# Patient Record
Sex: MALE | Race: WHITE | NOT HISPANIC OR LATINO | Employment: OTHER | URBAN - METROPOLITAN AREA
[De-identification: names, ages, dates, MRNs, and addresses within clinical notes are randomized per-mention and may not be internally consistent; named-entity substitution may affect disease eponyms.]

---

## 2017-03-15 ENCOUNTER — OFFICE VISIT (OUTPATIENT)
Dept: INTERNAL MEDICINE | Facility: CLINIC | Age: 57
End: 2017-03-15

## 2017-03-15 VITALS
WEIGHT: 204 LBS | HEIGHT: 70 IN | SYSTOLIC BLOOD PRESSURE: 136 MMHG | BODY MASS INDEX: 29.2 KG/M2 | DIASTOLIC BLOOD PRESSURE: 80 MMHG | OXYGEN SATURATION: 97 % | HEART RATE: 85 BPM

## 2017-03-15 DIAGNOSIS — E78.00 HYPERCHOLESTEREMIA: Primary | ICD-10-CM

## 2017-03-15 DIAGNOSIS — F90.9 ATTENTION DEFICIT HYPERACTIVITY DISORDER (ADHD), UNSPECIFIED ADHD TYPE: ICD-10-CM

## 2017-03-15 PROCEDURE — 99214 OFFICE O/P EST MOD 30 MIN: CPT | Performed by: NURSE PRACTITIONER

## 2017-03-15 RX ORDER — METHYLPHENIDATE HYDROCHLORIDE 36 MG/1
36 TABLET ORAL EVERY MORNING
Qty: 90 TABLET | Refills: 0 | Status: SHIPPED | OUTPATIENT
Start: 2017-03-15 | End: 2017-06-07 | Stop reason: SDUPTHER

## 2017-03-15 NOTE — TELEPHONE ENCOUNTER
Please advise med refill    Last OV:  Pt seeing Gayle today.    Kristian ordered    Thank you,    Luci

## 2017-03-16 ENCOUNTER — TELEPHONE (OUTPATIENT)
Dept: INTERNAL MEDICINE | Facility: CLINIC | Age: 57
End: 2017-03-16

## 2017-03-16 NOTE — PROGRESS NOTES
Subjective   Biju Verduzco is a 56 y.o. male who presents for f/u regarding ADD and hyperlipidemia.    HPI Comments: He is doing well with Concerta, denies side effects from medication. He believes medication helps him concentrate and focus on tasks both at home and work.  His recent labs showed an elevated cholesterol, . He has declined chol medication at this time.    ADD   This is a chronic problem. The current episode started more than 1 year ago. The problem occurs daily. The problem has been unchanged. Pertinent negatives include no abdominal pain, arthralgias, chest pain, chills, congestion, coughing, fatigue, fever, headaches, joint swelling, nausea, sore throat, vomiting or weakness. Treatments tried: Concerta. The treatment provided significant relief.   Hyperlipidemia   This is a chronic problem. The current episode started more than 1 year ago. The problem is uncontrolled. Recent lipid tests were reviewed and are high. He has no history of diabetes or hypothyroidism. Pertinent negatives include no chest pain. He is currently on no antihyperlipidemic treatment.        The following portions of the patient's history were reviewed and updated as appropriate: allergies, current medications, past social history and problem list.    Past Medical History   Diagnosis Date   • ADD (attention deficit disorder)    • Anxiety    • Depression    • ED (erectile dysfunction)    • Erectile dysfunction    • Hypercholesteremia    • Sleep disorder          Current Outpatient Prescriptions:   •  aspirin 81 MG EC tablet, Take 81 mg by mouth daily., Disp: , Rfl:   •  Cholecalciferol (VITAMIN D PO), Take  by mouth daily., Disp: , Rfl:   •  methylphenidate (CONCERTA) 36 MG CR tablet, Take 1 tablet by mouth Every Morning., Disp: 90 tablet, Rfl: 0  •  Multiple Vitamins-Minerals (MULTIVITAMIN ADULT PO), Take  by mouth daily., Disp: , Rfl:   •  Thiamine HCl (VITAMIN B-1 PO), Take  by mouth., Disp: , Rfl:   •  vitamin E 200  "UNIT capsule, Take 200 Units by mouth daily., Disp: , Rfl:     No Known Allergies    Review of Systems   Constitutional: Negative for chills, fatigue, fever and unexpected weight change.   HENT: Negative for congestion, ear pain, postnasal drip, sinus pressure, sore throat and trouble swallowing.    Eyes: Negative for visual disturbance.   Respiratory: Negative for cough, chest tightness and wheezing.    Cardiovascular: Negative for chest pain, palpitations and leg swelling.   Gastrointestinal: Negative for abdominal pain, blood in stool, nausea and vomiting.   Endocrine: Negative for cold intolerance and heat intolerance.   Genitourinary: Negative for dysuria, frequency and urgency.   Musculoskeletal: Negative for arthralgias and joint swelling.   Skin: Negative for color change.   Allergic/Immunologic: Negative for immunocompromised state.   Neurological: Negative for syncope, weakness and headaches.   Hematological: Does not bruise/bleed easily.   Psychiatric/Behavioral: Positive for decreased concentration.       Objective   Vitals:    03/15/17 0806   BP: 136/80   BP Location: Left arm   Patient Position: Sitting   Cuff Size: Adult   Pulse: 85   SpO2: 97%   Weight: 204 lb (92.5 kg)   Height: 70\" (177.8 cm)     Physical Exam   Constitutional: He is oriented to person, place, and time. He appears well-developed and well-nourished. No distress.   HENT:   Head: Normocephalic and atraumatic.   Right Ear: External ear normal.   Left Ear: External ear normal.   Eyes: Conjunctivae are normal.   Neck: Normal range of motion. Neck supple.   Cardiovascular: Normal rate, regular rhythm, normal heart sounds and intact distal pulses.  Exam reveals no gallop and no friction rub.    No murmur heard.  Pulmonary/Chest: Effort normal and breath sounds normal. No respiratory distress.   Lymphadenopathy:     He has no cervical adenopathy.   Neurological: He is alert and oriented to person, place, and time.   Skin: Skin is warm and " dry. No rash noted. No erythema.   Psychiatric: He has a normal mood and affect. His behavior is normal.   Nursing note and vitals reviewed.      Assessment/Plan   Biju was seen today for add.    Diagnoses and all orders for this visit:    Hypercholesteremia  Comments:  reviewed recent lipid panel, discussed low-fat, low-cholesterol diet along with regular exercise to help improve    Attention deficit hyperactivity disorder (ADHD), unspecified ADHD type  Comments:  continue Concerta    HEATH query complete. Treatment plan to include limited course of prescribed  controlled substance. Risks including addiction, benefits, and alternatives presented to patient.

## 2017-06-07 DIAGNOSIS — F90.9 ATTENTION DEFICIT HYPERACTIVITY DISORDER (ADHD), UNSPECIFIED ADHD TYPE: ICD-10-CM

## 2017-06-07 RX ORDER — METHYLPHENIDATE HYDROCHLORIDE 36 MG/1
36 TABLET ORAL EVERY MORNING
Qty: 90 TABLET | Refills: 0 | Status: SHIPPED | OUTPATIENT
Start: 2017-06-07 | End: 2017-09-15 | Stop reason: SDUPTHER

## 2017-06-07 NOTE — TELEPHONE ENCOUNTER
----- Message from Natacha Torres sent at 6/7/2017  8:45 AM EDT -----  Contact: Patient  Patient called requesting refill on     methylphenidate (CONCERTA) 36 MG CR tablet, #90    Please call when ready to be picked up    Patient:  [phone]

## 2017-06-12 ENCOUNTER — OFFICE VISIT (OUTPATIENT)
Dept: INTERNAL MEDICINE | Facility: CLINIC | Age: 57
End: 2017-06-12

## 2017-06-12 VITALS
WEIGHT: 202 LBS | BODY MASS INDEX: 28.92 KG/M2 | SYSTOLIC BLOOD PRESSURE: 126 MMHG | OXYGEN SATURATION: 97 % | HEART RATE: 76 BPM | HEIGHT: 70 IN | DIASTOLIC BLOOD PRESSURE: 82 MMHG

## 2017-06-12 DIAGNOSIS — F90.9 ATTENTION DEFICIT HYPERACTIVITY DISORDER (ADHD), UNSPECIFIED ADHD TYPE: Primary | ICD-10-CM

## 2017-06-12 DIAGNOSIS — E78.00 HYPERCHOLESTEREMIA: ICD-10-CM

## 2017-06-12 PROCEDURE — 99213 OFFICE O/P EST LOW 20 MIN: CPT | Performed by: NURSE PRACTITIONER

## 2017-06-13 NOTE — PROGRESS NOTES
Subjective   Biju Verduzco is a 57 y.o. male who presents for f/u regarding ADHD.     HPI Comments: His labs from 12/2016 showed elevated cholesterol, he is trying to watch diet but has declined medication for now. He has a longstanding history of ADHD and has done well with Concerta which he tolerates well. He often tries to skip dose on weekends or when he is not working, notices big difference in ability to concentrate and complete tasks without medication.    Hyperlipidemia   This is a chronic problem. The current episode started more than 1 year ago. The problem is uncontrolled. Recent lipid tests were reviewed and are high. He has no history of diabetes or hypothyroidism. Pertinent negatives include no chest pain or shortness of breath. He is currently on no antihyperlipidemic treatment.   ADD   This is a chronic problem. The current episode started more than 1 year ago. The problem occurs daily. The problem has been unchanged. Pertinent negatives include no abdominal pain, arthralgias, chest pain, chills, congestion, coughing, fatigue, fever, headaches, joint swelling, nausea, sore throat, vomiting or weakness. Treatments tried: takes Concerta. The treatment provided significant relief.        The following portions of the patient's history were reviewed and updated as appropriate: allergies, current medications, past social history and problem list.    Past Medical History:   Diagnosis Date   • ADD (attention deficit disorder)    • Anxiety    • Depression    • ED (erectile dysfunction)    • Erectile dysfunction    • Hypercholesteremia    • Sleep disorder          Current Outpatient Prescriptions:   •  aspirin 81 MG EC tablet, Take 81 mg by mouth daily., Disp: , Rfl:   •  Cholecalciferol (VITAMIN D PO), Take  by mouth daily., Disp: , Rfl:   •  methylphenidate (CONCERTA) 36 MG CR tablet, Take 1 tablet by mouth Every Morning., Disp: 90 tablet, Rfl: 0  •  Multiple Vitamins-Minerals (MULTIVITAMIN ADULT PO), Take  " by mouth daily., Disp: , Rfl:   •  Thiamine HCl (VITAMIN B-1 PO), Take  by mouth., Disp: , Rfl:   •  vitamin E 200 UNIT capsule, Take 200 Units by mouth daily., Disp: , Rfl:     No Known Allergies    Review of Systems   Constitutional: Negative for chills, fatigue, fever and unexpected weight change.   HENT: Negative for congestion, ear pain, postnasal drip, sinus pressure, sore throat and trouble swallowing.    Eyes: Negative for visual disturbance.   Respiratory: Negative for cough, chest tightness, shortness of breath and wheezing.    Cardiovascular: Negative for chest pain, palpitations and leg swelling.   Gastrointestinal: Negative for abdominal pain, blood in stool, nausea and vomiting.   Endocrine: Negative for cold intolerance and heat intolerance.   Genitourinary: Negative for dysuria, frequency and urgency.   Musculoskeletal: Negative for arthralgias and joint swelling.   Skin: Negative for color change.   Allergic/Immunologic: Negative for immunocompromised state.   Neurological: Negative for syncope, weakness and headaches.   Hematological: Does not bruise/bleed easily.   Psychiatric/Behavioral: Positive for decreased concentration.       Objective   Vitals:    06/12/17 1244   BP: 126/82   BP Location: Left arm   Patient Position: Sitting   Cuff Size: Adult   Pulse: 76   SpO2: 97%   Weight: 202 lb (91.6 kg)   Height: 70\" (177.8 cm)     Physical Exam   Constitutional: He is oriented to person, place, and time. He appears well-developed and well-nourished. No distress.   HENT:   Head: Normocephalic and atraumatic.   Right Ear: External ear normal.   Left Ear: External ear normal.   Eyes: Conjunctivae are normal.   Neck: Normal range of motion. Neck supple.   Cardiovascular: Normal rate, regular rhythm, normal heart sounds and intact distal pulses.  Exam reveals no gallop and no friction rub.    No murmur heard.  Pulmonary/Chest: Effort normal and breath sounds normal. No respiratory distress. "   Lymphadenopathy:     He has no cervical adenopathy.   Neurological: He is alert and oriented to person, place, and time.   Skin: Skin is warm and dry. No rash noted. No erythema.   Psychiatric: He has a normal mood and affect. His behavior is normal.   Nursing note and vitals reviewed.      Assessment/Plan   Biju was seen today for hyperlipidemia and add.    Diagnoses and all orders for this visit:    Attention deficit hyperactivity disorder (ADHD), unspecified ADHD type  Comments:  doing well with Concerta which was refilled by Dr. Mustafa today    Hypercholesteremia  Comments:  reviewed labs from 12/2016 with patient and discussed low-fat, low-cholesterol diet      HEATH query complete. Treatment plan to include limited course of prescribed  controlled substance. Risks including addiction, benefits, and alternatives presented to patient.

## 2017-08-28 ENCOUNTER — OFFICE VISIT (OUTPATIENT)
Dept: INTERNAL MEDICINE | Facility: CLINIC | Age: 57
End: 2017-08-28

## 2017-08-28 VITALS
DIASTOLIC BLOOD PRESSURE: 78 MMHG | WEIGHT: 199 LBS | BODY MASS INDEX: 28.49 KG/M2 | SYSTOLIC BLOOD PRESSURE: 128 MMHG | HEIGHT: 70 IN

## 2017-08-28 DIAGNOSIS — E78.00 HYPERCHOLESTEREMIA: Primary | ICD-10-CM

## 2017-08-28 DIAGNOSIS — Z11.59 SCREENING FOR VIRAL DISEASE: ICD-10-CM

## 2017-08-28 DIAGNOSIS — N52.9 ERECTILE DYSFUNCTION, UNSPECIFIED ERECTILE DYSFUNCTION TYPE: ICD-10-CM

## 2017-08-28 DIAGNOSIS — F90.9 ATTENTION DEFICIT HYPERACTIVITY DISORDER (ADHD), UNSPECIFIED ADHD TYPE: ICD-10-CM

## 2017-08-28 PROCEDURE — 99213 OFFICE O/P EST LOW 20 MIN: CPT | Performed by: NURSE PRACTITIONER

## 2017-08-29 NOTE — PROGRESS NOTES
Subjective   Biju Verduzco is a 57 y.o. male who presents for f/u regarding hyperlipidemia, ED and ADHD.    HPI Comments: He reports difficulty with sexual activity due to erectile dysfunction, requests testosterone level be checked.  He has a longstanding history of ADHD and has done well with Concerta which he tolerates well. He often tries to skip dose on weekends or when he is not working, notices big difference in ability to concentrate and complete tasks without medication.    Hyperlipidemia   This is a chronic problem. The current episode started more than 1 year ago. The problem is uncontrolled. Recent lipid tests were reviewed and are high. He has no history of diabetes or hypothyroidism. Pertinent negatives include no chest pain or shortness of breath. He is currently on no antihyperlipidemic treatment.   ADD   This is a chronic problem. The current episode started more than 1 year ago. The problem occurs daily. The problem has been unchanged. Pertinent negatives include no abdominal pain, arthralgias, chest pain, chills, congestion, coughing, fatigue, fever, headaches, joint swelling, nausea, sore throat, vomiting or weakness. Treatments tried: takes Concerta. The treatment provided significant relief.        The following portions of the patient's history were reviewed and updated as appropriate: allergies, current medications, past social history and problem list.    Past Medical History:   Diagnosis Date   • ADD (attention deficit disorder)    • Anxiety    • Depression    • ED (erectile dysfunction)    • Erectile dysfunction    • Hypercholesteremia    • Sleep disorder          Current Outpatient Prescriptions:   •  aspirin 81 MG EC tablet, Take 81 mg by mouth daily., Disp: , Rfl:   •  Cholecalciferol (VITAMIN D PO), Take  by mouth daily., Disp: , Rfl:   •  methylphenidate (CONCERTA) 36 MG CR tablet, Take 1 tablet by mouth Every Morning., Disp: 90 tablet, Rfl: 0  •  Multiple Vitamins-Minerals  "(MULTIVITAMIN ADULT PO), Take  by mouth daily., Disp: , Rfl:   •  Thiamine HCl (VITAMIN B-1 PO), Take  by mouth., Disp: , Rfl:   •  vitamin E 200 UNIT capsule, Take 200 Units by mouth daily., Disp: , Rfl:     No Known Allergies    Review of Systems   Constitutional: Negative for chills, fatigue, fever and unexpected weight change.   HENT: Negative for congestion, ear pain, postnasal drip, sinus pressure, sore throat and trouble swallowing.    Eyes: Negative for visual disturbance.   Respiratory: Negative for cough, chest tightness, shortness of breath and wheezing.    Cardiovascular: Negative for chest pain, palpitations and leg swelling.   Gastrointestinal: Negative for abdominal pain, blood in stool, nausea and vomiting.   Endocrine: Negative for cold intolerance and heat intolerance.   Genitourinary: Negative for dysuria, frequency and urgency.   Musculoskeletal: Negative for arthralgias and joint swelling.   Skin: Negative for color change.   Allergic/Immunologic: Negative for immunocompromised state.   Neurological: Negative for syncope, weakness and headaches.   Hematological: Does not bruise/bleed easily.       Objective   Vitals:    08/28/17 1311   BP: 128/78   BP Location: Left arm   Patient Position: Sitting   Cuff Size: Adult   Weight: 199 lb (90.3 kg)   Height: 70\" (177.8 cm)     Physical Exam   Constitutional: He is oriented to person, place, and time. He appears well-developed and well-nourished. No distress.   HENT:   Head: Normocephalic and atraumatic.   Right Ear: External ear normal.   Left Ear: External ear normal.   Eyes: Conjunctivae are normal.   Neck: Normal range of motion. Neck supple.   Cardiovascular: Normal rate, regular rhythm, normal heart sounds and intact distal pulses.  Exam reveals no gallop and no friction rub.    No murmur heard.  Pulmonary/Chest: Effort normal and breath sounds normal. No respiratory distress.   Lymphadenopathy:     He has no cervical adenopathy.   Neurological: " He is alert and oriented to person, place, and time.   Skin: Skin is warm and dry. No rash noted. No erythema.   Psychiatric: He has a normal mood and affect. His behavior is normal.   Nursing note and vitals reviewed.      Assessment/Plan   Biju was seen today for adhd.    Diagnoses and all orders for this visit:    Hypercholesteremia  Comments:  he will return for fasting labs, has declined statin therapy in the past  Orders:  -     Lipid Panel; Future  -     CBC Auto Differential; Future  -     Comprehensive Metabolic Panel; Future    Attention deficit hyperactivity disorder (ADHD), unspecified ADHD type  Comments:  doing well with Concerta    Erectile dysfunction, unspecified erectile dysfunction type  Comments:  will return for fasting Testosterone level  Orders:  -     Testosterone, Free, Total; Future    Screening for viral disease  -     Hepatitis C Antibody; Future

## 2017-09-15 DIAGNOSIS — F90.9 ATTENTION DEFICIT HYPERACTIVITY DISORDER (ADHD), UNSPECIFIED ADHD TYPE: ICD-10-CM

## 2017-09-15 RX ORDER — METHYLPHENIDATE HYDROCHLORIDE 36 MG/1
36 TABLET ORAL EVERY MORNING
Qty: 90 TABLET | Refills: 0 | Status: SHIPPED | OUTPATIENT
Start: 2017-09-15 | End: 2017-11-30 | Stop reason: SDUPTHER

## 2017-09-29 ENCOUNTER — CLINICAL SUPPORT (OUTPATIENT)
Dept: INTERNAL MEDICINE | Facility: CLINIC | Age: 57
End: 2017-09-29

## 2017-09-29 ENCOUNTER — LAB (OUTPATIENT)
Dept: INTERNAL MEDICINE | Facility: CLINIC | Age: 57
End: 2017-09-29

## 2017-09-29 DIAGNOSIS — E78.00 HYPERCHOLESTEREMIA: ICD-10-CM

## 2017-09-29 DIAGNOSIS — Z23 NEED FOR INFLUENZA VACCINATION: Primary | ICD-10-CM

## 2017-09-29 DIAGNOSIS — N52.9 ERECTILE DYSFUNCTION, UNSPECIFIED ERECTILE DYSFUNCTION TYPE: ICD-10-CM

## 2017-09-29 DIAGNOSIS — Z11.59 SCREENING FOR VIRAL DISEASE: ICD-10-CM

## 2017-09-29 LAB
ALBUMIN SERPL-MCNC: 4.7 G/DL (ref 3.5–5.2)
ALBUMIN/GLOB SERPL: 1.4 G/DL
ALP SERPL-CCNC: 68 U/L (ref 39–117)
ALT SERPL-CCNC: 26 U/L (ref 1–41)
AST SERPL-CCNC: 26 U/L (ref 1–40)
BASOPHILS # BLD AUTO: 0.03 10*3/MM3 (ref 0–0.2)
BASOPHILS NFR BLD AUTO: 0.3 % (ref 0–1.5)
BILIRUB SERPL-MCNC: 0.5 MG/DL (ref 0.1–1.2)
BUN SERPL-MCNC: 16 MG/DL (ref 6–20)
BUN/CREAT SERPL: 18.4 (ref 7–25)
CALCIUM SERPL-MCNC: 10.4 MG/DL (ref 8.6–10.5)
CHLORIDE SERPL-SCNC: 101 MMOL/L (ref 98–107)
CHOLEST SERPL-MCNC: 227 MG/DL (ref 0–200)
CO2 SERPL-SCNC: 26.4 MMOL/L (ref 22–29)
CREAT SERPL-MCNC: 0.87 MG/DL (ref 0.76–1.27)
EOSINOPHIL # BLD AUTO: 0.22 10*3/MM3 (ref 0–0.7)
EOSINOPHIL # BLD AUTO: 2.4 % (ref 0.3–6.2)
ERYTHROCYTE [DISTWIDTH] IN BLOOD BY AUTOMATED COUNT: 13.4 % (ref 11.5–14.5)
GLOBULIN SER CALC-MCNC: 3.3 GM/DL
GLUCOSE SERPL-MCNC: 106 MG/DL (ref 65–99)
HCT VFR BLD AUTO: 43.9 % (ref 40.4–52.2)
HDLC SERPL-MCNC: 41 MG/DL (ref 40–60)
HGB BLD-MCNC: 14.4 G/DL (ref 13.7–17.6)
IMM GRANULOCYTES # BLD: 0.03 10*3/MM3 (ref 0–0.03)
IMM GRANULOCYTES NFR BLD: 0.3 % (ref 0–0.5)
LDLC SERPL CALC-MCNC: 136 MG/DL (ref 0–100)
LYMPHOCYTES # BLD AUTO: 1.85 10*3/MM3 (ref 0.9–4.8)
LYMPHOCYTES NFR BLD AUTO: 20.5 % (ref 19.6–45.3)
MCH RBC QN AUTO: 29.3 PG (ref 27–32.7)
MCHC RBC AUTO-ENTMCNC: 32.8 G/DL (ref 32.6–36.4)
MCV RBC AUTO: 89.4 FL (ref 79.8–96.2)
MONOCYTES # BLD AUTO: 0.71 10*3/MM3 (ref 0.2–1.2)
MONOCYTES NFR BLD AUTO: 7.9 % (ref 5–12)
NEUTROPHILS # BLD AUTO: 6.18 10*3/MM3 (ref 1.9–8.1)
NEUTROPHILS NFR BLD AUTO: 68.6 % (ref 42.7–76)
PLATELET # BLD AUTO: 356 10*3/MM3 (ref 140–500)
POTASSIUM SERPL-SCNC: 4.8 MMOL/L (ref 3.5–5.2)
PROT SERPL-MCNC: 8 G/DL (ref 6–8.5)
RBC # BLD AUTO: 4.91 10*6/MM3 (ref 4.6–6)
SODIUM SERPL-SCNC: 141 MMOL/L (ref 136–145)
TRIGL SERPL-MCNC: 251 MG/DL (ref 0–150)
VLDLC SERPL-MCNC: 50.2 MG/DL (ref 5–40)
WBC # BLD AUTO: 9.02 10*3/MM3 (ref 4.5–10.7)

## 2017-09-29 PROCEDURE — 90686 IIV4 VACC NO PRSV 0.5 ML IM: CPT

## 2017-09-29 PROCEDURE — 90471 IMMUNIZATION ADMIN: CPT

## 2017-09-30 LAB — HCV AB S/CO SERPL IA: 0.1 S/CO RATIO (ref 0–0.9)

## 2017-10-01 LAB
TESTOST FREE SERPL-MCNC: 8.8 PG/ML (ref 7.2–24)
TESTOST SERPL-MCNC: 442 NG/DL (ref 264–916)

## 2017-11-30 ENCOUNTER — OFFICE VISIT (OUTPATIENT)
Dept: INTERNAL MEDICINE | Facility: CLINIC | Age: 57
End: 2017-11-30

## 2017-11-30 VITALS
HEIGHT: 77 IN | DIASTOLIC BLOOD PRESSURE: 74 MMHG | WEIGHT: 202 LBS | OXYGEN SATURATION: 98 % | HEART RATE: 77 BPM | BODY MASS INDEX: 23.85 KG/M2 | SYSTOLIC BLOOD PRESSURE: 124 MMHG

## 2017-11-30 DIAGNOSIS — F90.9 ATTENTION DEFICIT HYPERACTIVITY DISORDER (ADHD), UNSPECIFIED ADHD TYPE: ICD-10-CM

## 2017-11-30 DIAGNOSIS — E78.00 HYPERCHOLESTEREMIA: Primary | ICD-10-CM

## 2017-11-30 PROCEDURE — 99213 OFFICE O/P EST LOW 20 MIN: CPT | Performed by: NURSE PRACTITIONER

## 2017-11-30 RX ORDER — METHYLPHENIDATE HYDROCHLORIDE 36 MG/1
36 TABLET ORAL EVERY MORNING
Qty: 90 TABLET | Refills: 0 | Status: SHIPPED | OUTPATIENT
Start: 2017-11-30 | End: 2018-02-26 | Stop reason: SDUPTHER

## 2017-12-01 NOTE — PROGRESS NOTES
Subjective   Biju Verduzoc is a 57 y.o. male who presents for f/u regarding hyperlipidemia and ADHD.    HPI Comments: Reviewed labs from last visit with patient which showed normal testosterone levels and elevated cholesterol (has declined statins).    ADD   This is a chronic problem. The current episode started more than 1 year ago. The problem occurs daily. The problem has been unchanged. Pertinent negatives include no abdominal pain, arthralgias, chest pain, chills, congestion, coughing, fatigue, fever, headaches, joint swelling, nausea, sore throat, vomiting or weakness. Treatments tried: takes Concerta. The treatment provided significant relief.   Hyperlipidemia   This is a chronic problem. The current episode started more than 1 year ago. The problem is uncontrolled. Recent lipid tests were reviewed and are high. He has no history of diabetes or hypothyroidism. Pertinent negatives include no chest pain or shortness of breath. He is currently on no antihyperlipidemic treatment.        The following portions of the patient's history were reviewed and updated as appropriate: allergies, current medications, past social history and problem list.    Past Medical History:   Diagnosis Date   • ADD (attention deficit disorder)    • Anxiety    • Depression    • ED (erectile dysfunction)    • Erectile dysfunction    • Hypercholesteremia    • Sleep disorder          Current Outpatient Prescriptions:   •  aspirin 81 MG EC tablet, Take 81 mg by mouth daily., Disp: , Rfl:   •  Cholecalciferol (VITAMIN D PO), Take  by mouth daily., Disp: , Rfl:   •  Multiple Vitamins-Minerals (MULTIVITAMIN ADULT PO), Take  by mouth daily., Disp: , Rfl:   •  Thiamine HCl (VITAMIN B-1 PO), Take  by mouth., Disp: , Rfl:   •  vitamin E 200 UNIT capsule, Take 200 Units by mouth daily., Disp: , Rfl:   •  methylphenidate (CONCERTA) 36 MG CR tablet, Take 1 tablet by mouth Every Morning., Disp: 90 tablet, Rfl: 0    No Known Allergies    Review of  "Systems   Constitutional: Negative for chills, fatigue, fever and unexpected weight change.   HENT: Negative for congestion, ear pain, postnasal drip, sinus pressure, sore throat and trouble swallowing.    Eyes: Negative for visual disturbance.   Respiratory: Negative for cough, chest tightness, shortness of breath and wheezing.    Cardiovascular: Negative for chest pain, palpitations and leg swelling.   Gastrointestinal: Negative for abdominal pain, blood in stool, nausea and vomiting.   Genitourinary: Negative for dysuria, frequency and urgency.   Musculoskeletal: Negative for arthralgias and joint swelling.   Skin: Negative for color change.   Neurological: Negative for syncope, weakness and headaches.   Hematological: Does not bruise/bleed easily.   Psychiatric/Behavioral: Positive for decreased concentration.       Objective   Vitals:    11/30/17 1015 11/30/17 1036   BP: 142/82 124/74   BP Location: Left arm Left arm   Patient Position: Sitting    Cuff Size: Adult    Pulse: 77    SpO2: 98%    Weight: 202 lb (91.6 kg)    Height: 77\" (195.6 cm)      Physical Exam   Constitutional: He is oriented to person, place, and time. He appears well-developed and well-nourished. No distress.   HENT:   Head: Normocephalic and atraumatic.   Right Ear: External ear normal.   Left Ear: External ear normal.   Eyes: Conjunctivae are normal.   Neck: Normal range of motion. Neck supple.   Cardiovascular: Normal rate, regular rhythm, normal heart sounds and intact distal pulses.  Exam reveals no gallop and no friction rub.    No murmur heard.  Pulmonary/Chest: Effort normal and breath sounds normal. No respiratory distress.   Lymphadenopathy:     He has no cervical adenopathy.   Neurological: He is alert and oriented to person, place, and time.   Skin: Skin is warm and dry. No rash noted. No erythema.   Psychiatric: He has a normal mood and affect. His behavior is normal.   Nursing note and vitals reviewed.      Assessment/Plan "   Biju was seen today for add.    Diagnoses and all orders for this visit:    Hypercholesteremia  Comments:  reviewed recent lipid panel with patient    Attention deficit hyperactivity disorder (ADHD), unspecified ADHD type  Comments:  doing well with Concerta, refilled by Dr. Martin today    HEATH query complete. Treatment plan to include limited course of prescribed  controlled substance. Risks including addiction, benefits, and alternatives presented to patient.

## 2018-02-26 ENCOUNTER — OFFICE VISIT (OUTPATIENT)
Dept: INTERNAL MEDICINE | Facility: CLINIC | Age: 58
End: 2018-02-26

## 2018-02-26 VITALS
HEIGHT: 77 IN | HEART RATE: 89 BPM | WEIGHT: 192 LBS | DIASTOLIC BLOOD PRESSURE: 78 MMHG | OXYGEN SATURATION: 98 % | BODY MASS INDEX: 22.67 KG/M2 | SYSTOLIC BLOOD PRESSURE: 122 MMHG

## 2018-02-26 DIAGNOSIS — F90.9 ATTENTION DEFICIT HYPERACTIVITY DISORDER (ADHD), UNSPECIFIED ADHD TYPE: ICD-10-CM

## 2018-02-26 DIAGNOSIS — F90.9 ATTENTION DEFICIT HYPERACTIVITY DISORDER (ADHD), UNSPECIFIED ADHD TYPE: Primary | ICD-10-CM

## 2018-02-26 DIAGNOSIS — E78.00 HYPERCHOLESTEREMIA: ICD-10-CM

## 2018-02-26 PROCEDURE — 99213 OFFICE O/P EST LOW 20 MIN: CPT | Performed by: NURSE PRACTITIONER

## 2018-02-26 RX ORDER — METHYLPHENIDATE HYDROCHLORIDE 36 MG/1
36 TABLET ORAL EVERY MORNING
Qty: 90 TABLET | Refills: 0 | Status: SHIPPED | OUTPATIENT
Start: 2018-02-26 | End: 2018-05-29 | Stop reason: SDUPTHER

## 2018-02-27 NOTE — PROGRESS NOTES
Subjective   Biju Verduzco is a 57 y.o. male who presents for f/u regarding ADD and hyperlipidemia.    HPI Comments: He has lost 10# since his last visit by changing his diet, recent lipid panel showed elevated cholesterol. He is feeling well, reports improvement with Concerta.    ADD   This is a chronic problem. The current episode started more than 1 year ago. The problem occurs daily. The problem has been unchanged. Associated symptoms include congestion. Pertinent negatives include no abdominal pain, arthralgias, chest pain, chills, coughing, fatigue, fever, headaches, joint swelling, nausea, sore throat, vomiting or weakness. Treatments tried: takes Concerta. The treatment provided significant relief.   Hyperlipidemia   This is a chronic problem. The current episode started more than 1 year ago. The problem is uncontrolled. Recent lipid tests were reviewed and are high. He has no history of diabetes or hypothyroidism. Pertinent negatives include no chest pain or shortness of breath. Current antihyperlipidemic treatment includes diet change.        The following portions of the patient's history were reviewed and updated as appropriate: allergies, current medications, past social history and problem list.    Past Medical History:   Diagnosis Date   • ADD (attention deficit disorder)    • Anxiety    • Depression    • ED (erectile dysfunction)    • Erectile dysfunction    • Hypercholesteremia    • Sleep disorder          Current Outpatient Prescriptions:   •  aspirin 81 MG EC tablet, Take 81 mg by mouth daily., Disp: , Rfl:   •  Cholecalciferol (VITAMIN D PO), Take  by mouth daily., Disp: , Rfl:   •  Multiple Vitamins-Minerals (MULTIVITAMIN ADULT PO), Take  by mouth daily., Disp: , Rfl:   •  Thiamine HCl (VITAMIN B-1 PO), Take  by mouth., Disp: , Rfl:   •  vitamin E 200 UNIT capsule, Take 200 Units by mouth daily., Disp: , Rfl:   •  methylphenidate (CONCERTA) 36 MG CR tablet, Take 1 tablet by mouth Every Morning  "Earliest Fill Date: 2/26/18, Disp: 90 tablet, Rfl: 0    No Known Allergies    Review of Systems   Constitutional: Negative for chills, fatigue, fever and unexpected weight change.   HENT: Positive for congestion and postnasal drip. Negative for ear pain, sinus pressure, sore throat and trouble swallowing.    Eyes: Negative for visual disturbance.   Respiratory: Negative for cough, chest tightness, shortness of breath and wheezing.    Cardiovascular: Negative for chest pain, palpitations and leg swelling.   Gastrointestinal: Negative for abdominal pain, blood in stool, nausea and vomiting.   Genitourinary: Negative for dysuria, frequency and urgency.   Musculoskeletal: Negative for arthralgias and joint swelling.   Skin: Negative for color change.   Neurological: Negative for syncope, weakness and headaches.   Hematological: Does not bruise/bleed easily.   Psychiatric/Behavioral: Positive for decreased concentration.       Objective   Vitals:    02/26/18 1311   BP: 122/78   BP Location: Left arm   Patient Position: Sitting   Cuff Size: Adult   Pulse: 89   SpO2: 98%   Weight: 87.1 kg (192 lb)   Height: 195.6 cm (77\")     Physical Exam   Constitutional: He appears well-developed and well-nourished. He is cooperative. He does not have a sickly appearance. He does not appear ill.   HENT:   Head: Normocephalic.   Right Ear: Hearing, tympanic membrane and external ear normal. No drainage, swelling or tenderness. Tympanic membrane is not injected, not scarred, not erythematous and not bulging. No middle ear effusion. No decreased hearing is noted.   Left Ear: Hearing, tympanic membrane and external ear normal. No drainage, swelling or tenderness. Tympanic membrane is not injected, not scarred, not erythematous and not bulging.  No middle ear effusion. No decreased hearing is noted.   Nose: Nose normal. No mucosal edema, rhinorrhea, sinus tenderness or nasal deformity. Right sinus exhibits no maxillary sinus tenderness and " no frontal sinus tenderness. Left sinus exhibits no maxillary sinus tenderness and no frontal sinus tenderness.   Mouth/Throat: Mucous membranes are normal. Normal dentition. Posterior oropharyngeal erythema present.   Eyes: Conjunctivae and lids are normal. Pupils are equal, round, and reactive to light. Right eye exhibits no discharge and no exudate. Left eye exhibits no discharge and no exudate.   Neck: Trachea normal and normal range of motion. Carotid bruit is not present. No edema present. No thyroid mass and no thyromegaly present.   Cardiovascular: Regular rhythm, normal heart sounds and normal pulses.    No murmur heard.  Pulmonary/Chest: Breath sounds normal. No respiratory distress. He has no decreased breath sounds. He has no wheezes. He has no rhonchi. He has no rales.   Lymphadenopathy:        Head (right side): No submental, no submandibular, no tonsillar, no preauricular, no posterior auricular and no occipital adenopathy present.        Head (left side): No submental, no submandibular, no tonsillar, no preauricular, no posterior auricular and no occipital adenopathy present.   Neurological: He is alert.   Skin: Skin is warm, dry and intact. No cyanosis. Nails show no clubbing.       Assessment/Plan   Biju was seen today for add.    Diagnoses and all orders for this visit:    Attention deficit hyperactivity disorder (ADHD), unspecified ADHD type  Comments:  doing well with Concerta which he is tolerating well    Hypercholesteremia  Comments:  reviewed lipid panel, will recheck in several months due to dietary changes    HEATH query complete. Treatment plan to include limited course of prescribed  controlled substance. Risks including addiction, benefits, and alternatives presented to patient.

## 2018-05-29 ENCOUNTER — OFFICE VISIT (OUTPATIENT)
Dept: INTERNAL MEDICINE | Facility: CLINIC | Age: 58
End: 2018-05-29

## 2018-05-29 VITALS
WEIGHT: 189 LBS | SYSTOLIC BLOOD PRESSURE: 140 MMHG | OXYGEN SATURATION: 98 % | BODY MASS INDEX: 22.32 KG/M2 | HEIGHT: 77 IN | HEART RATE: 78 BPM | DIASTOLIC BLOOD PRESSURE: 82 MMHG

## 2018-05-29 DIAGNOSIS — F90.9 ATTENTION DEFICIT HYPERACTIVITY DISORDER (ADHD), UNSPECIFIED ADHD TYPE: ICD-10-CM

## 2018-05-29 DIAGNOSIS — H10.13 ALLERGIC CONJUNCTIVITIS OF BOTH EYES: ICD-10-CM

## 2018-05-29 DIAGNOSIS — F90.9 ATTENTION DEFICIT HYPERACTIVITY DISORDER (ADHD), UNSPECIFIED ADHD TYPE: Primary | ICD-10-CM

## 2018-05-29 PROCEDURE — 99213 OFFICE O/P EST LOW 20 MIN: CPT | Performed by: NURSE PRACTITIONER

## 2018-05-29 RX ORDER — METHYLPHENIDATE HYDROCHLORIDE 36 MG/1
36 TABLET ORAL EVERY MORNING
Qty: 90 TABLET | Refills: 0 | Status: SHIPPED | OUTPATIENT
Start: 2018-05-29 | End: 2018-08-29 | Stop reason: SDUPTHER

## 2018-05-29 NOTE — TELEPHONE ENCOUNTER
Please advise med refill    Last OV:  Pt seeing Gayle today, will schedule office visit with you next .    Kristian ordered    Thank you,    Luci

## 2018-05-29 NOTE — PROGRESS NOTES
Subjective   Biju Verduzco is a 57 y.o. male who presents for f/u regarding ADHD.    He has continued to lose weight by following a low-carb, low-sugar diet. He does c/o itching/irritation of eyes bilaterally with increased postnasal drainage.      ADD   This is a chronic problem. The current episode started more than 1 year ago. The problem occurs daily. The problem has been unchanged. Associated symptoms include congestion. Pertinent negatives include no abdominal pain, arthralgias, chest pain, chills, coughing, fatigue, fever, headaches, joint swelling, nausea, sore throat, vomiting or weakness. Treatments tried: takes Concerta. The treatment provided significant relief.        The following portions of the patient's history were reviewed and updated as appropriate: allergies, current medications, past social history and problem list.    Past Medical History:   Diagnosis Date   • ADD (attention deficit disorder)    • Anxiety    • Depression    • ED (erectile dysfunction)    • Erectile dysfunction    • Hypercholesteremia    • Sleep disorder          Current Outpatient Prescriptions:   •  aspirin 81 MG EC tablet, Take 81 mg by mouth daily., Disp: , Rfl:   •  Cholecalciferol (VITAMIN D PO), Take  by mouth daily., Disp: , Rfl:   •  Multiple Vitamins-Minerals (MULTIVITAMIN ADULT PO), Take  by mouth daily., Disp: , Rfl:   •  Thiamine HCl (VITAMIN B-1 PO), Take  by mouth., Disp: , Rfl:   •  vitamin E 200 UNIT capsule, Take 200 Units by mouth daily., Disp: , Rfl:   •  methylphenidate (CONCERTA) 36 MG CR tablet, Take 1 tablet by mouth Every Morning, Disp: 90 tablet, Rfl: 0    No Known Allergies    Review of Systems   Constitutional: Negative for chills, fatigue, fever and unexpected weight change.   HENT: Positive for congestion. Negative for ear pain, postnasal drip, sinus pressure, sore throat and trouble swallowing.    Eyes: Positive for itching. Negative for discharge and visual disturbance.   Respiratory: Negative  "for cough, chest tightness, shortness of breath and wheezing.    Cardiovascular: Negative for chest pain, palpitations and leg swelling.   Gastrointestinal: Negative for abdominal pain, blood in stool, nausea and vomiting.   Genitourinary: Negative for dysuria, frequency and urgency.   Musculoskeletal: Negative for arthralgias and joint swelling.   Skin: Negative for color change.   Neurological: Negative for syncope, weakness and headaches.   Hematological: Does not bruise/bleed easily.   Psychiatric/Behavioral: Positive for decreased concentration.       Objective   Vitals:    05/29/18 1104   BP: 140/82   BP Location: Left arm   Patient Position: Sitting   Cuff Size: Adult   Pulse: 78   SpO2: 98%   Weight: 85.7 kg (189 lb)   Height: 195.6 cm (77\")     Physical Exam   Constitutional: He appears well-developed and well-nourished. He is cooperative. He does not have a sickly appearance. He does not appear ill.   HENT:   Head: Normocephalic.   Right Ear: Hearing, tympanic membrane and external ear normal.   Left Ear: Hearing, tympanic membrane and external ear normal.   Nose: Nose normal. No mucosal edema, rhinorrhea, sinus tenderness or nasal deformity. Right sinus exhibits no maxillary sinus tenderness and no frontal sinus tenderness. Left sinus exhibits no maxillary sinus tenderness and no frontal sinus tenderness.   Mouth/Throat: Oropharynx is clear and moist and mucous membranes are normal. Normal dentition.   Eyes: Lids are normal. Right eye exhibits no discharge and no exudate. Left eye exhibits no discharge and no exudate.   Redness bilateral conjunctivae   Neck: Trachea normal and normal range of motion. Carotid bruit is not present. No edema present. No thyroid mass and no thyromegaly present.   Cardiovascular: Regular rhythm, normal heart sounds and normal pulses.    No murmur heard.  Pulmonary/Chest: Breath sounds normal. No respiratory distress. He has no decreased breath sounds. He has no wheezes. He has " no rhonchi. He has no rales.   Lymphadenopathy:        Head (right side): No submental, no submandibular, no tonsillar, no preauricular, no posterior auricular and no occipital adenopathy present.        Head (left side): No submental, no submandibular, no tonsillar, no preauricular, no posterior auricular and no occipital adenopathy present.   Neurological: He is alert.   Skin: Skin is warm, dry and intact. No cyanosis. Nails show no clubbing.       Assessment/Plan   Biju was seen today for add.    Diagnoses and all orders for this visit:    Attention deficit hyperactivity disorder (ADHD), unspecified ADHD type  Comments:  doing well with current meds, Concerta refilled by Dr. Whaley today    Allergic conjunctivitis of both eyes  Comments:  discussed use of Zaditor eye drops    Reviewed labs from 9/2017 with patient, will recheck at f/u appt.    HEATH query complete. Treatment plan to include limited course of prescribed  controlled substance. Risks including addiction, benefits, and alternatives presented to patient.

## 2018-08-29 ENCOUNTER — OFFICE VISIT (OUTPATIENT)
Dept: INTERNAL MEDICINE | Facility: CLINIC | Age: 58
End: 2018-08-29

## 2018-08-29 VITALS
OXYGEN SATURATION: 98 % | SYSTOLIC BLOOD PRESSURE: 132 MMHG | HEART RATE: 76 BPM | WEIGHT: 188 LBS | BODY MASS INDEX: 22.2 KG/M2 | HEIGHT: 77 IN | DIASTOLIC BLOOD PRESSURE: 82 MMHG

## 2018-08-29 DIAGNOSIS — G47.9 SLEEP DISORDER: Chronic | ICD-10-CM

## 2018-08-29 DIAGNOSIS — F90.9 ATTENTION DEFICIT HYPERACTIVITY DISORDER (ADHD), UNSPECIFIED ADHD TYPE: ICD-10-CM

## 2018-08-29 DIAGNOSIS — E78.00 HYPERCHOLESTEREMIA: Primary | ICD-10-CM

## 2018-08-29 DIAGNOSIS — N52.9 ERECTILE DYSFUNCTION, UNSPECIFIED ERECTILE DYSFUNCTION TYPE: ICD-10-CM

## 2018-08-29 PROCEDURE — 99214 OFFICE O/P EST MOD 30 MIN: CPT | Performed by: INTERNAL MEDICINE

## 2018-08-29 RX ORDER — METHYLPHENIDATE HYDROCHLORIDE 36 MG/1
36 TABLET ORAL EVERY MORNING
Qty: 90 TABLET | Refills: 0 | Status: SHIPPED | OUTPATIENT
Start: 2018-08-29 | End: 2018-12-20 | Stop reason: SDUPTHER

## 2018-08-29 NOTE — PROGRESS NOTES
Subjective   Biju Verduzco is a 58 y.o. male.     History of Present Illness   57 yo/m with a chief complaint of ADD (well compensated), ED (well compensated), hyperlipidemia (diet and exercise), sleep disorder (recommended Tranquil Sleep supliment), here for follow up. He is doing well on his current regimen.  The following portions of the patient's history were reviewed and updated as appropriate: allergies, current medications, past family history, past medical history, past social history, past surgical history and problem list.    Review of Systems   Constitutional: Negative.    HENT: Negative.    Eyes: Negative.    Respiratory: Negative.    Cardiovascular: Negative.    Gastrointestinal: Negative.    Endocrine: Negative.    Genitourinary: Negative.    Musculoskeletal: Negative.    Skin: Negative.    Allergic/Immunologic: Negative.    Neurological: Negative.    Hematological: Negative.    Psychiatric/Behavioral: Negative.        Objective   Physical Exam   Constitutional: He is oriented to person, place, and time. He appears well-developed and well-nourished.   HENT:   Head: Normocephalic and atraumatic.   Eyes: Pupils are equal, round, and reactive to light. EOM are normal.   Neck: Normal range of motion. Neck supple.   Cardiovascular: Normal rate, regular rhythm and normal heart sounds.    Pulmonary/Chest: Effort normal and breath sounds normal.   Abdominal: Soft. Bowel sounds are normal.   Musculoskeletal: Normal range of motion.   Neurological: He is alert and oriented to person, place, and time.   Skin: Skin is warm and dry.   Psychiatric: He has a normal mood and affect. His behavior is normal. Judgment and thought content normal.   Nursing note and vitals reviewed.        Assessment/Plan   Biju was seen today for add and med refill.    Diagnoses and all orders for this visit:    Hypercholesteremia  Comments:  will check lipids    Erectile dysfunction, unspecified erectile dysfunction  type  Comments:  well compensated    Attention deficit hyperactivity disorder (ADHD), unspecified ADHD type  -     methylphenidate (CONCERTA) 36 MG CR tablet; Take 1 tablet by mouth Every Morning    Sleep disorder  Comments:  recommend Tranquil Sleep suppliment

## 2018-10-02 ENCOUNTER — TELEPHONE (OUTPATIENT)
Dept: INTERNAL MEDICINE | Facility: CLINIC | Age: 58
End: 2018-10-02

## 2018-10-02 NOTE — TELEPHONE ENCOUNTER
----- Message from Gayle Davenport sent at 10/2/2018 12:20 PM EDT -----  pts wife calling to check on the status of a [facility]  For pts CONCERTA 36 MG CR tablet  Please advise    Pt#[phone]

## 2018-12-20 DIAGNOSIS — F90.9 ATTENTION DEFICIT HYPERACTIVITY DISORDER (ADHD), UNSPECIFIED ADHD TYPE: ICD-10-CM

## 2018-12-20 RX ORDER — METHYLPHENIDATE HYDROCHLORIDE 36 MG/1
36 TABLET ORAL EVERY MORNING
Qty: 90 TABLET | Refills: 0 | Status: SHIPPED | OUTPATIENT
Start: 2018-12-20 | End: 2019-03-06 | Stop reason: SDUPTHER

## 2018-12-20 NOTE — TELEPHONE ENCOUNTER
----- Message from Jemma Millard sent at 12/20/2018  1:26 PM EST -----  Contact: Patient  Patient calling states he is going out of town and will not have enough methylphenidate (CONCERTA) 36 MG CR tablet to last til he gets back. Would like a refill     methylphenidate (CONCERTA) 36 MG CR tablet    Patient:[phone]    Pharm[id number] [place] [place] [address] AT [facility] [phone] [facility] [phone]

## 2019-03-06 ENCOUNTER — OFFICE VISIT (OUTPATIENT)
Dept: INTERNAL MEDICINE | Facility: CLINIC | Age: 59
End: 2019-03-06

## 2019-03-06 VITALS
SYSTOLIC BLOOD PRESSURE: 124 MMHG | HEIGHT: 77 IN | BODY MASS INDEX: 23.14 KG/M2 | DIASTOLIC BLOOD PRESSURE: 80 MMHG | WEIGHT: 196 LBS

## 2019-03-06 DIAGNOSIS — R07.89 ATYPICAL CHEST PAIN: Chronic | ICD-10-CM

## 2019-03-06 DIAGNOSIS — E78.00 HYPERCHOLESTEREMIA: Primary | Chronic | ICD-10-CM

## 2019-03-06 DIAGNOSIS — R41.3 SHORT-TERM MEMORY LOSS: ICD-10-CM

## 2019-03-06 DIAGNOSIS — G47.9 SLEEP DISORDER: ICD-10-CM

## 2019-03-06 DIAGNOSIS — N52.9 ERECTILE DYSFUNCTION, UNSPECIFIED ERECTILE DYSFUNCTION TYPE: Chronic | ICD-10-CM

## 2019-03-06 DIAGNOSIS — F32.5 MAJOR DEPRESSIVE DISORDER WITH SINGLE EPISODE, IN FULL REMISSION (HCC): ICD-10-CM

## 2019-03-06 DIAGNOSIS — F90.9 ATTENTION DEFICIT HYPERACTIVITY DISORDER (ADHD), UNSPECIFIED ADHD TYPE: ICD-10-CM

## 2019-03-06 LAB
ALBUMIN SERPL-MCNC: 4.6 G/DL (ref 3.5–5.2)
ALBUMIN/GLOB SERPL: 1.3 G/DL
ALP SERPL-CCNC: 64 U/L (ref 39–117)
ALT SERPL W P-5'-P-CCNC: 29 U/L (ref 1–41)
ANION GAP SERPL CALCULATED.3IONS-SCNC: 13.6 MMOL/L
AST SERPL-CCNC: 24 U/L (ref 1–40)
BASOPHILS # BLD AUTO: 0.03 10*3/MM3 (ref 0–0.2)
BASOPHILS NFR BLD AUTO: 0.4 % (ref 0–1.5)
BILIRUB SERPL-MCNC: 0.5 MG/DL (ref 0.1–1.2)
BUN BLD-MCNC: 18 MG/DL (ref 6–20)
BUN/CREAT SERPL: 16.8 (ref 7–25)
CALCIUM SPEC-SCNC: 10.1 MG/DL (ref 8.6–10.5)
CHLORIDE SERPL-SCNC: 100 MMOL/L (ref 98–107)
CHOLEST SERPL-MCNC: 231 MG/DL (ref 0–200)
CO2 SERPL-SCNC: 27.4 MMOL/L (ref 22–29)
CREAT BLD-MCNC: 1.07 MG/DL (ref 0.76–1.27)
DEPRECATED RDW RBC AUTO: 40.9 FL (ref 37–54)
EOSINOPHIL # BLD AUTO: 0.22 10*3/MM3 (ref 0–0.4)
EOSINOPHIL NFR BLD AUTO: 2.8 % (ref 0.3–6.2)
ERYTHROCYTE [DISTWIDTH] IN BLOOD BY AUTOMATED COUNT: 13.1 % (ref 12.3–15.4)
ERYTHROCYTE [SEDIMENTATION RATE] IN BLOOD: 13 MM/HR (ref 0–20)
GFR SERPL CREATININE-BSD FRML MDRD: 71 ML/MIN/1.73
GLOBULIN UR ELPH-MCNC: 3.5 GM/DL
GLUCOSE BLD-MCNC: 121 MG/DL (ref 65–99)
HCT VFR BLD AUTO: 44.4 % (ref 37.5–51)
HDLC SERPL-MCNC: 46 MG/DL (ref 40–60)
HGB BLD-MCNC: 14.6 G/DL (ref 13–17.7)
LDLC SERPL CALC-MCNC: 143 MG/DL (ref 0–100)
LDLC/HDLC SERPL: 3.1 {RATIO}
LYMPHOCYTES # BLD AUTO: 1.96 10*3/MM3 (ref 0.7–3.1)
LYMPHOCYTES NFR BLD AUTO: 25.2 % (ref 19.6–45.3)
MCH RBC QN AUTO: 28.7 PG (ref 26.6–33)
MCHC RBC AUTO-ENTMCNC: 32.9 G/DL (ref 31.5–35.7)
MCV RBC AUTO: 87.2 FL (ref 79–97)
MONOCYTES # BLD AUTO: 0.74 10*3/MM3 (ref 0.1–0.9)
MONOCYTES NFR BLD AUTO: 9.5 % (ref 5–12)
NEUTROPHILS # BLD AUTO: 4.83 10*3/MM3 (ref 1.4–7)
NEUTROPHILS NFR BLD AUTO: 62.1 % (ref 42.7–76)
PLATELET # BLD AUTO: 352 10*3/MM3 (ref 140–450)
PMV BLD AUTO: 10.5 FL (ref 6–12)
POTASSIUM BLD-SCNC: 4.7 MMOL/L (ref 3.5–5.2)
PROT SERPL-MCNC: 8.1 G/DL (ref 6–8.5)
RBC # BLD AUTO: 5.09 10*6/MM3 (ref 4.14–5.8)
SODIUM BLD-SCNC: 141 MMOL/L (ref 136–145)
TRIGL SERPL-MCNC: 212 MG/DL (ref 0–150)
TSH SERPL DL<=0.05 MIU/L-ACNC: 3.48 MIU/ML (ref 0.27–4.2)
VIT B12 BLD-MCNC: 690 PG/ML (ref 211–946)
VLDLC SERPL-MCNC: 42.4 MG/DL (ref 5–40)
WBC NRBC COR # BLD: 7.78 10*3/MM3 (ref 3.4–10.8)

## 2019-03-06 PROCEDURE — 93000 ELECTROCARDIOGRAM COMPLETE: CPT | Performed by: INTERNAL MEDICINE

## 2019-03-06 PROCEDURE — 82607 VITAMIN B-12: CPT | Performed by: INTERNAL MEDICINE

## 2019-03-06 PROCEDURE — 36415 COLL VENOUS BLD VENIPUNCTURE: CPT | Performed by: INTERNAL MEDICINE

## 2019-03-06 PROCEDURE — 85025 COMPLETE CBC W/AUTO DIFF WBC: CPT | Performed by: INTERNAL MEDICINE

## 2019-03-06 PROCEDURE — 85651 RBC SED RATE NONAUTOMATED: CPT | Performed by: INTERNAL MEDICINE

## 2019-03-06 PROCEDURE — 99214 OFFICE O/P EST MOD 30 MIN: CPT | Performed by: INTERNAL MEDICINE

## 2019-03-06 PROCEDURE — 80061 LIPID PANEL: CPT | Performed by: INTERNAL MEDICINE

## 2019-03-06 PROCEDURE — 80053 COMPREHEN METABOLIC PANEL: CPT | Performed by: INTERNAL MEDICINE

## 2019-03-06 PROCEDURE — 84443 ASSAY THYROID STIM HORMONE: CPT | Performed by: INTERNAL MEDICINE

## 2019-03-06 RX ORDER — METHYLPHENIDATE HYDROCHLORIDE 36 MG/1
36 TABLET ORAL EVERY MORNING
Qty: 90 TABLET | Refills: 0 | Status: SHIPPED | OUTPATIENT
Start: 2019-03-06 | End: 2019-06-04 | Stop reason: SDUPTHER

## 2019-03-06 NOTE — PROGRESS NOTES
Subjective   Biju Verduzco is a 58 y.o. male.  Resents with a chief complaint of atypical left upper chest pain that is unrelated to activity no associated shortness of breath nausea vomiting or diaphoresis.  He has had 2 separate cardiac workups both of which were negative.  The upper chest pain occurs during the winter approximately once a year.  An EKG that was done today was perfect and I do not think that any further cardiac workup is warranted.  Of greater concern to the patient is his short-term memory loss.  He does have an issue with attention deficit disorder for which he takes Concerta on a daily basis but he states that the short-term memory loss is different that his issues with attention.  Strong family history of Alzheimer's disease his mother and grandmother  of Alzheimer's disease.  I recommended neuropsychiatric testing and a full battery of labs to assess his physiologic health.    History of Present Illness all the patient's current medical issues are stable except for 2 issues.  First issue is episodic left upper chest pain unrelated to activity and the patient's deep concern about short-term memory loss.    The following portions of the patient's history were reviewed and updated as appropriate: allergies, current medications, past family history, past medical history, past social history, past surgical history and problem list.    Review of Systems   Constitutional: Negative.    HENT: Negative.    Eyes: Negative.    Respiratory: Negative.    Cardiovascular: Positive for chest pain.   Gastrointestinal: Negative.    Endocrine: Negative.    Genitourinary: Negative.    Musculoskeletal: Negative.    Allergic/Immunologic: Negative.    Neurological: Positive for memory problem.   Hematological: Negative.    Psychiatric/Behavioral: Positive for decreased concentration.       Objective   Physical Exam   Constitutional: He is oriented to person, place, and time. He appears well-developed and  well-nourished.   HENT:   Head: Normocephalic and atraumatic.   Eyes: EOM are normal. Pupils are equal, round, and reactive to light.   Neck: Normal range of motion. Neck supple.   Cardiovascular: Normal rate, regular rhythm and normal heart sounds.   Pulmonary/Chest: Effort normal and breath sounds normal.   Abdominal: Soft. Bowel sounds are normal.   Musculoskeletal: Normal range of motion.   Neurological: He is alert and oriented to person, place, and time.   Skin: Skin is warm and dry.   Psychiatric: He has a normal mood and affect. His behavior is normal. Judgment and thought content normal.   Nursing note and vitals reviewed.        Assessment/Plan   Biju was seen today for hyperlipidemia and add.    Diagnoses and all orders for this visit:    Hypercholesteremia  Comments:  diet and exercise  Orders:  -     Comprehensive metabolic panel; Future  -     Lipid Panel With LDL/HDL Ratio; Future    Erectile dysfunction, unspecified erectile dysfunction type  Comments:  no change in therapy    Major depressive disorder with single episode, in full remission (CMS/[facility])  Comments:  well controlled without medication    Sleep disorder  Comments:  recommended sleep medicine follow up    Attention deficit hyperactivity disorder (ADHD), unspecified ADHD type  Comments:  concerta QD  Orders:  -     methylphenidate (CONCERTA) 36 MG CR tablet; Take 1 tablet by mouth Every Morning    Atypical chest pain  Comments:  EKG  Orders:  -     ECG 12 Lead    Short-term memory loss  Comments:  neuropsychiatric testing  Orders:  -     CBC w AUTO Differential; Future  -     TSH; Future  -     Vitamin B12; Future  -     Sedimentation rate, automated; Future

## 2019-06-04 ENCOUNTER — OFFICE VISIT (OUTPATIENT)
Dept: INTERNAL MEDICINE | Facility: CLINIC | Age: 59
End: 2019-06-04

## 2019-06-04 VITALS
BODY MASS INDEX: 23.5 KG/M2 | HEART RATE: 83 BPM | HEIGHT: 77 IN | SYSTOLIC BLOOD PRESSURE: 144 MMHG | WEIGHT: 199 LBS | OXYGEN SATURATION: 98 % | DIASTOLIC BLOOD PRESSURE: 82 MMHG

## 2019-06-04 DIAGNOSIS — N52.9 ERECTILE DYSFUNCTION, UNSPECIFIED ERECTILE DYSFUNCTION TYPE: Chronic | ICD-10-CM

## 2019-06-04 DIAGNOSIS — M77.02 EPICONDYLITIS ELBOW, MEDIAL, LEFT: ICD-10-CM

## 2019-06-04 DIAGNOSIS — F90.9 ATTENTION DEFICIT HYPERACTIVITY DISORDER (ADHD), UNSPECIFIED ADHD TYPE: ICD-10-CM

## 2019-06-04 DIAGNOSIS — F90.9 ATTENTION DEFICIT HYPERACTIVITY DISORDER (ADHD), UNSPECIFIED ADHD TYPE: Chronic | ICD-10-CM

## 2019-06-04 DIAGNOSIS — E78.00 HYPERCHOLESTEREMIA: Primary | Chronic | ICD-10-CM

## 2019-06-04 PROCEDURE — 99214 OFFICE O/P EST MOD 30 MIN: CPT | Performed by: INTERNAL MEDICINE

## 2019-06-04 RX ORDER — METHYLPHENIDATE HYDROCHLORIDE 36 MG/1
36 TABLET ORAL EVERY MORNING
Qty: 90 TABLET | Refills: 0 | Status: SHIPPED | OUTPATIENT
Start: 2019-06-04 | End: 2019-09-06 | Stop reason: SDUPTHER

## 2019-06-04 NOTE — PROGRESS NOTES
Subjective   Biju Verduzco is a 58 y.o. male.  [place] with chief complaint of left elbow pain with flexion extension, ADD, upper lipidemia and erectile dysfunction, with some issues with short-term memory loss here for follow-up.    History of Present Illness the patient is developed left elbow epicondylitis over the last month    The following portions of the patient's history were reviewed and updated as appropriate: allergies, current medications, past family history, past medical history, past social history, past surgical history and problem list.    Review of Systems   Constitutional: Negative.    HENT: Negative.    Eyes: Negative.    Respiratory: Negative.    Cardiovascular: Negative.    Gastrointestinal: Negative.    Endocrine: Negative.    Genitourinary: Negative.    Musculoskeletal: Positive for arthralgias.   Skin: Negative.    Allergic/Immunologic: Negative.    Neurological: Positive for memory problem.   Hematological: Negative.    Psychiatric/Behavioral: Negative.        Objective   Physical Exam   Constitutional: He appears well-developed.   HENT:   Head: Normocephalic and atraumatic.   Eyes: EOM are normal. Pupils are equal, round, and reactive to light.   Neck: Normal range of motion.   Cardiovascular: Normal rate, regular rhythm and normal heart sounds.   Pulmonary/Chest: Effort normal and breath sounds normal.   Abdominal: Soft. Bowel sounds are normal.   Musculoskeletal:   Left elbow epicondylitis   Neurological: He is alert.   Skin: Skin is warm.   Psychiatric: He has a normal mood and affect.   Nursing note and vitals reviewed.        Assessment/Plan   Biju was seen today for add and memory loss.    Diagnoses and all orders for this visit:    Hypercholesteremia  Comments:  no change in therapy    Erectile dysfunction, unspecified erectile dysfunction type  Comments:  no change in therapy    Attention deficit hyperactivity disorder (ADHD), unspecified ADHD type  Comments:  concerta  daily  Orders:  -     methylphenidate (CONCERTA) 36 MG CR tablet; Take 1 tablet by mouth Every Morning    Epicondylitis elbow, medial, left  Comments:  orthopedic surgery  Orders:  -     Ambulatory Referral to Orthopedic Surgery    Attention deficit hyperactivity disorder (ADHD), unspecified ADHD type  Comments:  concerta QD  Orders:  -     methylphenidate (CONCERTA) 36 MG CR tablet; Take 1 tablet by mouth Every Morning

## 2019-07-05 ENCOUNTER — OFFICE VISIT (OUTPATIENT)
Dept: ORTHOPEDIC SURGERY | Facility: CLINIC | Age: 59
End: 2019-07-05

## 2019-07-05 VITALS — HEIGHT: 70 IN | BODY MASS INDEX: 28.92 KG/M2 | WEIGHT: 202 LBS | TEMPERATURE: 98.2 F

## 2019-07-05 DIAGNOSIS — M25.522 LEFT ELBOW PAIN: Primary | ICD-10-CM

## 2019-07-05 PROCEDURE — 20605 DRAIN/INJ JOINT/BURSA W/O US: CPT | Performed by: ORTHOPAEDIC SURGERY

## 2019-07-05 PROCEDURE — 99203 OFFICE O/P NEW LOW 30 MIN: CPT | Performed by: ORTHOPAEDIC SURGERY

## 2019-07-05 PROCEDURE — 73070 X-RAY EXAM OF ELBOW: CPT | Performed by: ORTHOPAEDIC SURGERY

## 2019-07-05 RX ORDER — METHYLPREDNISOLONE ACETATE 80 MG/ML
80 INJECTION, SUSPENSION INTRA-ARTICULAR; INTRALESIONAL; INTRAMUSCULAR; SOFT TISSUE
Status: COMPLETED | OUTPATIENT
Start: 2019-07-05 | End: 2019-07-05

## 2019-07-05 RX ADMIN — METHYLPREDNISOLONE ACETATE 80 MG: 80 INJECTION, SUSPENSION INTRA-ARTICULAR; INTRALESIONAL; INTRAMUSCULAR; SOFT TISSUE at 09:57

## 2019-07-05 NOTE — PROGRESS NOTES
Biju Verduzco     : 1960     MRN: [id number]     DATE: 2019    Chief Complaints:  Left elbow pain    History of Present Illness:     59 y.o. male patient who presents for evaluation of the left elbow.  The patient reports that the symptoms began about 3 months ago.  Denies any discrete injury, precipitating event or factor.  Pain is mild, intermittent and burning.  He localizes the pain to the lateral aspect of the elbow.  The pain is worse with reaching and lifting.    Allergies: No Known Allergies    Medications:   Home Medications:    Current Outpatient Medications:   •  methylphenidate (CONCERTA) 36 MG CR tablet, Take 1 tablet by mouth Every Morning, Disp: 90 tablet, Rfl: 0  •  aspirin 81 MG EC tablet, Take 81 mg by mouth daily., Disp: , Rfl:   •  Cholecalciferol (VITAMIN D PO), Take  by mouth daily., Disp: , Rfl:   •  Multiple Vitamins-Minerals (MULTIVITAMIN ADULT PO), Take  by mouth daily., Disp: , Rfl:   •  Thiamine HCl (VITAMIN B-1 PO), Take  by mouth., Disp: , Rfl:   •  vitamin E 200 UNIT capsule, Take 200 Units by mouth daily., Disp: , Rfl:   Past Medical History:   Diagnosis Date   • ADD (attention deficit disorder)    • Anxiety    • Depression    • ED (erectile dysfunction)    • Erectile dysfunction    • Hypercholesteremia    • Sleep disorder      Past Surgical History:   Procedure Laterality Date   • CARDIOVASCULAR STRESS TEST  2013    stress Echo     Social History     Occupational History   • Not on file   Tobacco Use   • Smoking status: Never Smoker   Substance and Sexual Activity   • Alcohol use: Yes     Comment: OCC   • Drug use: No   • Sexual activity: Not on file      Social History     Social History Narrative   • Not on file     Family History   Problem Relation Age of Onset   • Hypothyroidism Mother        Review of Systems:      Constitutional: Denies fever, shaking or chills   Eyes: Denies change in visual acuity   HEENT: Denies nasal congestion or sore throat   Respiratory:  "Denies cough or shortness of breath   Cardiovascular: Denies chest pain or edema  Endocrine: Denies tremors, palpitations, intolerance of heat or cold, polyuria, polydipsia.  GI: Denies abdominal pain, nausea, vomiting, bloody stools or diarrhea  : Denies frequency, urgency, incontinence, retention, or nocturia.  Musculoskeletal: Denies numbness, tingling or loss of motor function except as above  Integument: Denies rash, lesion or ulceration   Neurologic: Denies headache or focal weakness, deficits  Heme: Denies spontaneous or excessive bleeding, epistaxis, hematuria, melena, fatigue, enlarged or tender lymph nodes.      All other pertinent positives and negatives as noted above in HPI.    Physical Exam: 59 y.o. male    Vitals:    07/05/19 0848   Temp: 98.2 °F (36.8 °C)   TempSrc: Temporal   Weight: 91.6 kg (202 lb)   Height: 177.8 cm (70\")     General:  Patient is awake and alert.  Appears in no acute distress or discomfort.    Psych:  Affect and demeanor are appropriate.    Eyes:  Conjunctiva and sclera appear grossly normal.  Eyes track well and EOM seem to be intact.    Ears:  No gross abnormalities.  Hearing adequate for the exam.    Cardiovascular:  Regular rate and rhythm.    Lungs:  Good chest expansion.  Breathing unlabored.    Extremities:  Left elbow skin appears benign.  No obvious lesions, swellings, masses or adenopathy.  Focal tenderness noted over the lateral epicondyle.  No tenderness over the radial tunnel or medial side.  Full elbow motion.  No instability.  Good strength with elbow flexion, extension, supination, pronation.  Pain at the lateral epicondyle with resisted wrist extension.  Good strength in hand.  Sensation intact.  Palpable radial pulse with good skin turgor and brisk capillary refill.    DIAGNOSTIC STUDIES    Xrays:  AP and lateral views of the left elbow are ordered by myself and reviewed to evaluate the patient's complaint.  No comparison films are immediately available.  The " x-rays show no obvious acute abnormalities, lesions, masses, significant degenerative changes, or other concerning findings.    ASSESSMENT: Left elbow lateral epicondylitis    PLAN:  We discussed options in detail, both surgical and non-surgical.  I have recommended that we start with a conservative approach.  We discussed all available conservative treatment options including activity modifications, bracing, anti-inflammatories, physical therapy, and injections including PRP and cortisone.  We discussed recent research regarding PRP and cortisone and the long-term detrimental effects of cortisone.  We had a lengthy discussion.  For now, he would still like to get a cortisone injection.  The risk, benefits and alternatives were discussed.  He consented.  He will follow-up as needed.    Medium Joint Arthrocentesis: L elbow  Date/Time: 7/5/2019 9:57 AM  Consent given by: patient  Site marked: site marked  Timeout: Immediately prior to procedure a time out was called to verify the correct patient, procedure, equipment, support staff and site/side marked as required   Supporting Documentation  Indications: pain   Procedure Details  Location: elbow - L elbow  Preparation: Patient was prepped and draped in the usual sterile fashion  Needle size: 25 G  Approach: anterolateral  Medications administered: 80 mg methylPREDNISolone acetate 80 MG/ML; 1 mL lidocaine (cardiac)  Patient tolerance: patient tolerated the procedure well with no immediate complications          Gibran Brito MD    07/05/2019    CC to Robinson Whaley MD

## 2019-09-06 ENCOUNTER — OFFICE VISIT (OUTPATIENT)
Dept: INTERNAL MEDICINE | Facility: CLINIC | Age: 59
End: 2019-09-06

## 2019-09-06 VITALS
SYSTOLIC BLOOD PRESSURE: 140 MMHG | DIASTOLIC BLOOD PRESSURE: 86 MMHG | BODY MASS INDEX: 27.63 KG/M2 | RESPIRATION RATE: 14 BRPM | HEIGHT: 70 IN | WEIGHT: 193 LBS

## 2019-09-06 DIAGNOSIS — F90.9 ATTENTION DEFICIT HYPERACTIVITY DISORDER (ADHD), UNSPECIFIED ADHD TYPE: ICD-10-CM

## 2019-09-06 DIAGNOSIS — E78.00 HYPERCHOLESTEREMIA: Primary | Chronic | ICD-10-CM

## 2019-09-06 DIAGNOSIS — N52.9 ERECTILE DYSFUNCTION, UNSPECIFIED ERECTILE DYSFUNCTION TYPE: ICD-10-CM

## 2019-09-06 DIAGNOSIS — R41.3 SHORT-TERM MEMORY LOSS: ICD-10-CM

## 2019-09-06 PROCEDURE — 99214 OFFICE O/P EST MOD 30 MIN: CPT | Performed by: INTERNAL MEDICINE

## 2019-09-06 RX ORDER — METHYLPHENIDATE HYDROCHLORIDE 36 MG/1
36 TABLET ORAL EVERY MORNING
Qty: 90 TABLET | Refills: 0 | Status: SHIPPED | OUTPATIENT
Start: 2019-09-06 | End: 2019-12-10 | Stop reason: SDUPTHER

## 2019-09-06 RX ORDER — MEMANTINE HYDROCHLORIDE 10 MG/1
10 TABLET ORAL 2 TIMES DAILY
Qty: 180 TABLET | Refills: 3 | Status: SHIPPED | OUTPATIENT
Start: 2019-09-06 | End: 2020-09-14

## 2019-12-10 ENCOUNTER — OFFICE VISIT (OUTPATIENT)
Dept: INTERNAL MEDICINE | Facility: CLINIC | Age: 59
End: 2019-12-10

## 2019-12-10 VITALS
HEART RATE: 69 BPM | OXYGEN SATURATION: 98 % | SYSTOLIC BLOOD PRESSURE: 144 MMHG | DIASTOLIC BLOOD PRESSURE: 98 MMHG | BODY MASS INDEX: 28.49 KG/M2 | HEIGHT: 70 IN | WEIGHT: 199 LBS

## 2019-12-10 DIAGNOSIS — E78.00 HYPERCHOLESTEREMIA: Primary | Chronic | ICD-10-CM

## 2019-12-10 DIAGNOSIS — G47.9 SLEEP DISORDER: ICD-10-CM

## 2019-12-10 DIAGNOSIS — F90.9 ATTENTION DEFICIT HYPERACTIVITY DISORDER (ADHD), UNSPECIFIED ADHD TYPE: ICD-10-CM

## 2019-12-10 DIAGNOSIS — N52.9 ERECTILE DYSFUNCTION, UNSPECIFIED ERECTILE DYSFUNCTION TYPE: Chronic | ICD-10-CM

## 2019-12-10 DIAGNOSIS — Z23 NEED FOR VACCINATION: ICD-10-CM

## 2019-12-10 DIAGNOSIS — R03.0 BORDERLINE SYSTOLIC HTN: ICD-10-CM

## 2019-12-10 DIAGNOSIS — R41.3 SHORT-TERM MEMORY LOSS: ICD-10-CM

## 2019-12-10 PROCEDURE — 90686 IIV4 VACC NO PRSV 0.5 ML IM: CPT | Performed by: INTERNAL MEDICINE

## 2019-12-10 PROCEDURE — 90471 IMMUNIZATION ADMIN: CPT | Performed by: INTERNAL MEDICINE

## 2019-12-10 PROCEDURE — 99214 OFFICE O/P EST MOD 30 MIN: CPT | Performed by: INTERNAL MEDICINE

## 2019-12-10 RX ORDER — METHYLPHENIDATE HYDROCHLORIDE 36 MG/1
36 TABLET ORAL EVERY MORNING
Qty: 90 TABLET | Refills: 0 | Status: SHIPPED | OUTPATIENT
Start: 2019-12-10 | End: 2020-03-30 | Stop reason: SDUPTHER

## 2019-12-10 NOTE — PROGRESS NOTES
"Subjective   Biju Verduzco is a 59 y.o. male.  Patient presents with chief complaint of attention deficit disorder, short-term memory loss for which he is being treated with Namenda 10 mg twice a day, sleep disorder for which I recommended the tranquil sleep supplement over-the-counter, erectile dysfunction, and borderline hypertension, here for evaluation treatment and medication renewal.    /98   Pulse 69   Ht 177.8 cm (70\")   Wt 90.3 kg (199 lb)   SpO2 98%   BMI 28.55 kg/m²     Body mass index is 28.55 kg/m².    History of Present Illness doing well on his current medications apparently when he is away from the office checks his blood pressure elsewhere he usually has blood pressures in the low 130s over 80s.    The following portions of the patient's history were reviewed and updated as appropriate: allergies, current medications, past family history, past medical history, past social history, past surgical history and problem list.    Review of Systems   Constitutional: Negative.    HENT: Negative.    Eyes: Negative.    Respiratory: Negative.    Cardiovascular: Negative.    Gastrointestinal: Negative.    Endocrine: Negative.    Genitourinary: Positive for erectile dysfunction.   Musculoskeletal: Positive for arthralgias.   Skin: Negative.    Allergic/Immunologic: Negative.    Neurological: Positive for memory problem.   Hematological: Negative.    Psychiatric/Behavioral: Positive for decreased concentration.       Objective   Physical Exam   Constitutional: He is oriented to person, place, and time. He appears well-developed and well-nourished.   HENT:   Head: Normocephalic.   Eyes: Pupils are equal, round, and reactive to light. EOM are normal.   Neck: Normal range of motion.   Cardiovascular: Normal rate, regular rhythm and normal heart sounds.   Pulmonary/Chest: Effort normal and breath sounds normal.   Abdominal: Soft. Bowel sounds are normal.   Musculoskeletal: Normal range of motion. "   Neurological: He is alert and oriented to person, place, and time.   Skin: Skin is warm and dry.   Psychiatric: He has a normal mood and affect. His behavior is normal. Judgment and thought content normal.   Nursing note and vitals reviewed.        Assessment/Plan   Biju was seen today for add and memory loss.    Diagnoses and all orders for this visit:    Hypercholesteremia  Comments:  continue diet and exercise    Sleep disorder  Comments:  Tranquil Sleep suppliment    Short-term memory loss  Comments:  namenda BID    Erectile dysfunction, unspecified erectile dysfunction type  Comments:  no change in therapy    Attention deficit hyperactivity disorder (ADHD), unspecified ADHD type  Comments:  concerta daily  Orders:  -     methylphenidate (CONCERTA) 36 MG CR tablet; Take 1 tablet by mouth Every Morning    Borderline systolic HTN  Comments:  continue to monitor    Attention deficit hyperactivity disorder (ADHD), unspecified ADHD type  Comments:  concerta QD  Orders:  -     methylphenidate (CONCERTA) 36 MG CR tablet; Take 1 tablet by mouth Every Morning

## 2020-03-06 ENCOUNTER — OFFICE VISIT (OUTPATIENT)
Dept: INTERNAL MEDICINE | Facility: CLINIC | Age: 60
End: 2020-03-06

## 2020-03-06 VITALS
BODY MASS INDEX: 28.92 KG/M2 | SYSTOLIC BLOOD PRESSURE: 110 MMHG | HEART RATE: 82 BPM | HEIGHT: 70 IN | DIASTOLIC BLOOD PRESSURE: 70 MMHG | OXYGEN SATURATION: 98 % | WEIGHT: 202 LBS

## 2020-03-06 DIAGNOSIS — N52.9 ERECTILE DYSFUNCTION, UNSPECIFIED ERECTILE DYSFUNCTION TYPE: ICD-10-CM

## 2020-03-06 DIAGNOSIS — E78.00 HYPERCHOLESTEREMIA: Primary | ICD-10-CM

## 2020-03-06 DIAGNOSIS — L98.9 SKIN LESION OF SCALP: ICD-10-CM

## 2020-03-06 DIAGNOSIS — M77.02 EPICONDYLITIS ELBOW, MEDIAL, LEFT: ICD-10-CM

## 2020-03-06 DIAGNOSIS — J30.1 SEASONAL ALLERGIC RHINITIS DUE TO POLLEN: ICD-10-CM

## 2020-03-06 DIAGNOSIS — R03.0 BORDERLINE SYSTOLIC HTN: ICD-10-CM

## 2020-03-06 PROCEDURE — 99214 OFFICE O/P EST MOD 30 MIN: CPT | Performed by: INTERNAL MEDICINE

## 2020-03-06 NOTE — PROGRESS NOTES
"Subjective   Biju Verduzco is a 59 y.o. male.  Patient presents with chief complaint of hyperlipidemia hypertension, epicondylitis of his left elbow that is chronic in nature, erectile dysfunction, seasonal allergic rhinitis, and an encrusted lesion of his scalp that is been itching and bleeding for the last couple of months intermittently.      /70   Pulse 82   Ht 177.8 cm (70\")   Wt 91.6 kg (202 lb)   SpO2 98%   BMI 28.98 kg/m²     Body mass index is 28.98 kg/m².    History of Present Illness worsening epicondylitis of his left elbow and a suspicious skin lesion of his scalp    The following portions of the patient's history were reviewed and updated as appropriate: allergies, current medications, past family history, past medical history, past social history, past surgical history and problem list.    Review of Systems   Constitutional: Negative.    HENT: Negative.    Respiratory: Negative.    Cardiovascular: Negative.    Gastrointestinal: Negative.    Genitourinary: Positive for erectile dysfunction.   Musculoskeletal: Positive for arthralgias.   Skin: Positive for skin lesions.   Neurological: Negative.    Psychiatric/Behavioral: Negative.        Objective   Physical Exam   Constitutional: He is oriented to person, place, and time. He appears well-developed.   HENT:   Head: Normocephalic and atraumatic.   Cardiovascular: Normal rate, regular rhythm and normal heart sounds.   Pulmonary/Chest: Effort normal and breath sounds normal.   Abdominal: Soft. Bowel sounds are normal.   Musculoskeletal:   Chronic epicondylitis of his left elbow   Neurological: He is alert and oriented to person, place, and time.   Skin:   Encrusted skin lesion of the scalp that requires dermatologic follow-up   Nursing note and vitals reviewed.        Assessment/Plan   Biju was seen today for add and memory loss.    Diagnoses and all orders for this visit:    Hypercholesteremia  Comments:  Diet and exercise  Orders:  -     " Comprehensive metabolic panel; Future  -     Lipid Panel With LDL/HDL Ratio; Future    Borderline systolic HTN  Comments:  Much better today than it has been in previous readings    Epicondylitis elbow, medial, left  Comments:  Orthopedic follow-up  Orders:  -     Ambulatory Referral to Orthopedic Surgery    Erectile dysfunction, unspecified erectile dysfunction type  Comments:  Levitra 20 mg p.o. as needed if the patient so desires to try any medications    Seasonal allergic rhinitis due to pollen  Comments:  I recommended Zyrtec and Flonase    Skin lesion of scalp  Comments:  Dermatologic follow-up  Orders:  -     Ambulatory Referral to Dermatology

## 2020-03-11 ENCOUNTER — RESULTS ENCOUNTER (OUTPATIENT)
Dept: INTERNAL MEDICINE | Facility: CLINIC | Age: 60
End: 2020-03-11

## 2020-03-11 DIAGNOSIS — E78.00 HYPERCHOLESTEREMIA: ICD-10-CM

## 2020-03-23 PROBLEM — J30.1 SEASONAL ALLERGIC RHINITIS DUE TO POLLEN: Status: ACTIVE | Noted: 2020-03-23

## 2020-03-23 PROBLEM — L98.9 SKIN LESION OF SCALP: Status: ACTIVE | Noted: 2020-03-23

## 2020-03-30 DIAGNOSIS — F90.9 ATTENTION DEFICIT HYPERACTIVITY DISORDER (ADHD), UNSPECIFIED ADHD TYPE: ICD-10-CM

## 2020-03-30 NOTE — TELEPHONE ENCOUNTER
PT REQUESTED REFILLS FOR    methylphenidate (CONCERTA) 36 MG CR tablet         PT CALLBACK [phone]    PHARMACY CONFIRMED       Patient is COMPLETELY OUT OF THE MEDICATION

## 2020-03-31 RX ORDER — METHYLPHENIDATE HYDROCHLORIDE 36 MG/1
36 TABLET ORAL EVERY MORNING
Qty: 30 TABLET | Refills: 0 | Status: SHIPPED | OUTPATIENT
Start: 2020-03-31 | End: 2020-04-30 | Stop reason: SDUPTHER

## 2020-03-31 NOTE — TELEPHONE ENCOUNTER
PT CALLED AND REQUESTED AN UPDATE ON THE MEDICINE methylphenidate (CONCERTA) 36 MG CR tablet ABOUT IT BEING CALLED IN    PLEASE ADVISE

## 2020-04-30 ENCOUNTER — TELEPHONE (OUTPATIENT)
Dept: INTERNAL MEDICINE | Facility: CLINIC | Age: 60
End: 2020-04-30

## 2020-04-30 DIAGNOSIS — F90.9 ATTENTION DEFICIT HYPERACTIVITY DISORDER (ADHD), UNSPECIFIED ADHD TYPE: ICD-10-CM

## 2020-04-30 RX ORDER — METHYLPHENIDATE HYDROCHLORIDE 36 MG/1
36 TABLET ORAL EVERY MORNING
Qty: 30 TABLET | Refills: 0 | Status: SHIPPED | OUTPATIENT
Start: 2020-04-30 | End: 2020-05-29 | Stop reason: SDUPTHER

## 2020-04-30 NOTE — TELEPHONE ENCOUNTER
Pt called and requested refill for methylphenidate (Concerta) 36 MG CR tablet. Patient would like 90 day supply sent to    [facility]/pharmacy #[id number] - [place], [place] - [address] - [phone]  - [phone] FX       Pt call back   [phone]

## 2020-05-29 DIAGNOSIS — F90.9 ATTENTION DEFICIT HYPERACTIVITY DISORDER (ADHD), UNSPECIFIED ADHD TYPE: ICD-10-CM

## 2020-05-29 RX ORDER — METHYLPHENIDATE HYDROCHLORIDE 36 MG/1
36 TABLET ORAL EVERY MORNING
Qty: 30 TABLET | Refills: 0 | Status: SHIPPED | OUTPATIENT
Start: 2020-05-29 | End: 2020-07-02 | Stop reason: SDUPTHER

## 2020-05-29 NOTE — TELEPHONE ENCOUNTER
PATIENT CALLED TO REQUEST REFILLS OF      methylphenidate (Concerta) 36 MG CR tablet    [facility]/pharmacy #[id number] - [place], [place] - [address] - [phone] [facility] [phone] FX

## 2020-07-02 DIAGNOSIS — F90.9 ATTENTION DEFICIT HYPERACTIVITY DISORDER (ADHD), UNSPECIFIED ADHD TYPE: ICD-10-CM

## 2020-07-02 RX ORDER — METHYLPHENIDATE HYDROCHLORIDE 36 MG/1
36 TABLET ORAL EVERY MORNING
Qty: 30 TABLET | Refills: 0 | Status: SHIPPED | OUTPATIENT
Start: 2020-07-02 | End: 2020-07-27 | Stop reason: SDUPTHER

## 2020-07-02 NOTE — TELEPHONE ENCOUNTER
Please review refill request:    Last OV: 3/6/2020-Next 9/15/2020    Last Prescribed: 5/29/2020    HEATH: Ordered    Thank you,    Von

## 2020-07-02 NOTE — TELEPHONE ENCOUNTER
Patient calling to refill the following medication and is completely out:    methylphenidate (Concerta) 36 MG CR tablet    Pharmacy: [facility]/pharmacy #[id number] - [place], [place] - [address] - [phone] [facility] [phone]   [phone]    Please call to advise at [phone]

## 2020-07-27 ENCOUNTER — TELEPHONE (OUTPATIENT)
Dept: INTERNAL MEDICINE | Facility: CLINIC | Age: 60
End: 2020-07-27

## 2020-07-27 DIAGNOSIS — F90.9 ATTENTION DEFICIT HYPERACTIVITY DISORDER (ADHD), UNSPECIFIED ADHD TYPE: ICD-10-CM

## 2020-07-27 NOTE — TELEPHONE ENCOUNTER
PT IS CALLING IN TO REQUEST A MED REFILL ON HIS    methylphenidate (Concerta) 36 MG CR tablet  PT SAYS HIS PRESCRIPTION WILL RUN OUT ON Friday.      PT CALL BACK   [phone]  PHARMACY  [address]  [phone]

## 2020-07-28 RX ORDER — METHYLPHENIDATE HYDROCHLORIDE 36 MG/1
36 TABLET ORAL EVERY MORNING
Qty: 30 TABLET | Refills: 0 | Status: SHIPPED | OUTPATIENT
Start: 2020-07-28 | End: 2020-08-31 | Stop reason: SDUPTHER

## 2020-08-21 ENCOUNTER — TELEPHONE (OUTPATIENT)
Dept: INTERNAL MEDICINE | Facility: CLINIC | Age: 60
End: 2020-08-21

## 2020-08-21 RX ORDER — TOBRAMYCIN AND DEXAMETHASONE 3; 1 MG/ML; MG/ML
1 SUSPENSION/ DROPS OPHTHALMIC
Qty: 10 ML | Refills: 1 | Status: SHIPPED | OUTPATIENT
Start: 2020-08-21 | End: 2020-12-28

## 2020-08-21 NOTE — TELEPHONE ENCOUNTER
Patient calling and states he has bilateral eye drainage, itchiness and wakes up with crustiness and would like something called in to help. Verified [facility] on [address].    Please call and advise at [phone].

## 2020-08-31 DIAGNOSIS — F90.9 ATTENTION DEFICIT HYPERACTIVITY DISORDER (ADHD), UNSPECIFIED ADHD TYPE: ICD-10-CM

## 2020-08-31 RX ORDER — METHYLPHENIDATE HYDROCHLORIDE 36 MG/1
36 TABLET ORAL EVERY MORNING
Qty: 30 TABLET | Refills: 0 | Status: SHIPPED | OUTPATIENT
Start: 2020-08-31 | End: 2020-09-15 | Stop reason: SDUPTHER

## 2020-08-31 NOTE — TELEPHONE ENCOUNTER
Patient called to request a medication refill on methylphenidate (Concerta) 36 MG CR tablet      [facility]/pharmacy #[id number] - [place], [place] - [address] - [phone] [facility] [phone] FX       Call back     [phone]

## 2020-08-31 NOTE — TELEPHONE ENCOUNTER
Please review refill request:    Last OV: 3/6/2020-Next 9/15/2020    Last Prescribed: 7/28/2020    HEATH: Ordered    Thank you,    Von

## 2020-09-14 RX ORDER — MEMANTINE HYDROCHLORIDE 10 MG/1
TABLET ORAL
Qty: 180 TABLET | Refills: 1 | Status: SHIPPED | OUTPATIENT
Start: 2020-09-14 | End: 2021-10-05

## 2020-09-15 ENCOUNTER — OFFICE VISIT (OUTPATIENT)
Dept: INTERNAL MEDICINE | Facility: CLINIC | Age: 60
End: 2020-09-15

## 2020-09-15 VITALS
SYSTOLIC BLOOD PRESSURE: 110 MMHG | HEART RATE: 60 BPM | TEMPERATURE: 97.5 F | HEIGHT: 70 IN | BODY MASS INDEX: 27.2 KG/M2 | DIASTOLIC BLOOD PRESSURE: 82 MMHG | WEIGHT: 190 LBS | OXYGEN SATURATION: 98 %

## 2020-09-15 DIAGNOSIS — G47.9 SLEEP DISORDER: ICD-10-CM

## 2020-09-15 DIAGNOSIS — F90.9 ATTENTION DEFICIT HYPERACTIVITY DISORDER (ADHD), UNSPECIFIED ADHD TYPE: ICD-10-CM

## 2020-09-15 DIAGNOSIS — J30.1 SEASONAL ALLERGIC RHINITIS DUE TO POLLEN: ICD-10-CM

## 2020-09-15 DIAGNOSIS — N52.9 ERECTILE DYSFUNCTION, UNSPECIFIED ERECTILE DYSFUNCTION TYPE: ICD-10-CM

## 2020-09-15 DIAGNOSIS — E78.00 HYPERCHOLESTEREMIA: ICD-10-CM

## 2020-09-15 DIAGNOSIS — Z23 NEED FOR VACCINATION: Primary | ICD-10-CM

## 2020-09-15 DIAGNOSIS — R03.0 BORDERLINE SYSTOLIC HTN: ICD-10-CM

## 2020-09-15 LAB
ALBUMIN SERPL-MCNC: 4.9 G/DL (ref 3.5–5.2)
ALBUMIN/GLOB SERPL: 2 G/DL
ALP SERPL-CCNC: 72 U/L (ref 39–117)
ALT SERPL-CCNC: 21 U/L (ref 1–41)
AST SERPL-CCNC: 26 U/L (ref 1–40)
BILIRUB SERPL-MCNC: 0.3 MG/DL (ref 0–1.2)
BUN SERPL-MCNC: 21 MG/DL (ref 8–23)
BUN/CREAT SERPL: 22.3 (ref 7–25)
CALCIUM SERPL-MCNC: 9.9 MG/DL (ref 8.6–10.5)
CHLORIDE SERPL-SCNC: 102 MMOL/L (ref 98–107)
CHOLEST SERPL-MCNC: 189 MG/DL (ref 0–200)
CO2 SERPL-SCNC: 26.7 MMOL/L (ref 22–29)
CREAT SERPL-MCNC: 0.94 MG/DL (ref 0.76–1.27)
GLOBULIN SER CALC-MCNC: 2.5 GM/DL
GLUCOSE SERPL-MCNC: 102 MG/DL (ref 65–99)
HDLC SERPL-MCNC: 48 MG/DL (ref 40–60)
LDLC SERPL CALC-MCNC: 122 MG/DL (ref 0–100)
LDLC/HDLC SERPL: 2.54 {RATIO}
POTASSIUM SERPL-SCNC: 4.9 MMOL/L (ref 3.5–5.2)
PROT SERPL-MCNC: 7.4 G/DL (ref 6–8.5)
SODIUM SERPL-SCNC: 139 MMOL/L (ref 136–145)
TRIGL SERPL-MCNC: 95 MG/DL (ref 0–150)
VLDLC SERPL CALC-MCNC: 19 MG/DL

## 2020-09-15 PROCEDURE — 99214 OFFICE O/P EST MOD 30 MIN: CPT | Performed by: INTERNAL MEDICINE

## 2020-09-15 PROCEDURE — 90686 IIV4 VACC NO PRSV 0.5 ML IM: CPT | Performed by: INTERNAL MEDICINE

## 2020-09-15 PROCEDURE — 90471 IMMUNIZATION ADMIN: CPT | Performed by: INTERNAL MEDICINE

## 2020-09-15 RX ORDER — METHYLPHENIDATE HYDROCHLORIDE 36 MG/1
36 TABLET ORAL EVERY MORNING
Qty: 30 TABLET | Refills: 0 | Status: SHIPPED | OUTPATIENT
Start: 2020-09-15 | End: 2020-11-09 | Stop reason: SDUPTHER

## 2020-09-15 NOTE — PROGRESS NOTES
"Carolyn Verduzco is a 60 y.o. male.  Patient presents with chief complaint of ADD, primary sleep disorder, chronic allergic rhinitis, borderline systolic hypertension which is well controlled today, hyperlipidemia, erectile dysfunction is well compensated for, and he is in need of vaccination today so he got a flu shot.  He is doing very well overall with his current medications no needs for changes at this time and he is very cognizant of taking significant precautions during this COVID pandemic.      /82   Pulse 60   Temp 97.5 °F (36.4 °C)   Ht 177.8 cm (70\")   Wt 86.2 kg (190 lb)   SpO2 98%   BMI 27.26 kg/m²     Body mass index is 27.26 kg/m².    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    Review of Systems   Constitutional: Negative.    HENT: Negative.    Respiratory: Negative.    Cardiovascular: Negative.    Gastrointestinal: Negative.    Musculoskeletal: Negative.    Neurological: Negative.    Psychiatric/Behavioral: Positive for decreased concentration.       Objective   Physical Exam  Vitals signs and nursing note reviewed.   Constitutional:       Appearance: Normal appearance.   HENT:      Head: Normocephalic and atraumatic.   Neck:      Musculoskeletal: Normal range of motion and neck supple.   Cardiovascular:      Rate and Rhythm: Normal rate and regular rhythm.      Pulses: Normal pulses.      Heart sounds: Normal heart sounds.   Pulmonary:      Effort: Pulmonary effort is normal.      Breath sounds: Normal breath sounds.   Abdominal:      General: Abdomen is flat. Bowel sounds are normal.      Palpations: Abdomen is soft.   Musculoskeletal: Normal range of motion.   Neurological:      General: " No focal deficit present.      Mental Status: He is alert and oriented to person, place, and time. Mental status is at baseline.   Psychiatric:         Mood and Affect: Mood normal.         Behavior: Behavior normal.         Thought Content: Thought content normal.         Judgment: Judgment normal.           Assessment/Plan   Biju was seen today for add and memory loss.    Diagnoses and all orders for this visit:    Need for vaccination  Comments:  Patient was given a flu vaccine today  Orders:  -     Fluarix/Fluzone/Afluria Quad>6 Months    Hypercholesteremia  Comments:  Patient was sent for a CMP and lipid panel today  Orders:  -     Comprehensive metabolic panel; Future  -     Lipid Panel With LDL/HDL Ratio; Future    Borderline systolic HTN  Comments:  Doing very well no interventions needed    Seasonal allergic rhinitis due to pollen  Comments:  Currently not being bothered by his allergies    Sleep disorder  Comments:  Sleeping much better with over-the-counter sleep aids    Erectile dysfunction, unspecified erectile dysfunction type  Comments:  No change in therapy required    Attention deficit hyperactivity disorder (ADHD), unspecified ADHD type  Comments:  Concerta daily  Orders:  -     methylphenidate (Concerta) 36 MG CR tablet; Take 1 tablet by mouth Every Morning    Attention deficit hyperactivity disorder (ADHD), unspecified ADHD type  Comments:  concerta QD  Orders:  -     methylphenidate (Concerta) 36 MG CR tablet; Take 1 tablet by mouth Every Morning

## 2020-11-09 DIAGNOSIS — F90.9 ATTENTION DEFICIT HYPERACTIVITY DISORDER (ADHD), UNSPECIFIED ADHD TYPE: ICD-10-CM

## 2020-11-09 RX ORDER — METHYLPHENIDATE HYDROCHLORIDE 36 MG/1
36 TABLET ORAL EVERY MORNING
Qty: 30 TABLET | Refills: 0 | Status: SHIPPED | OUTPATIENT
Start: 2020-11-09 | End: 2020-12-07 | Stop reason: SDUPTHER

## 2020-11-09 NOTE — TELEPHONE ENCOUNTER
Caller: Biju Verduzco    Relationship: Self    Best call back number: [phone]    Medication needed:   Requested Prescriptions     Pending Prescriptions Disp Refills   • methylphenidate (Concerta) 36 MG CR tablet 30 tablet 0     Sig: Take 1 tablet by mouth Every Morning       When do you need the refill by: ASAP    What details did the patient provide when requesting the medication: PATIENT IS COMPLETELY OUT OF MEDICATION.     Does the patient have less than a 3 day supply:  [x] Yes  [] No    What is the patient's preferred pharmacy: [facility]/PHARMACY #[id number] - [place], [place] - [address] - [phone]  - [phone] FX

## 2020-11-23 RX ORDER — TADALAFIL 5 MG/1
5 TABLET ORAL DAILY PRN
Qty: 10 TABLET | Refills: 1 | Status: SHIPPED | OUTPATIENT
Start: 2020-11-23 | End: 2020-12-28

## 2020-11-23 NOTE — TELEPHONE ENCOUNTER
Caller: Biju Verduzco    Relationship: Self    Best call back number: [phone]    Medication needed: CIALIS 5MG    When do you need the refill by: ASAP    What details did the patient provide when requesting the medication: PATIENT COMPLETELY OUT OF MEDICATION    Does the patient have less than a 3 day supply:  [x] Yes  [] No    What is the patient's preferred pharmacy: [facility]/PHARMACY #[id number] - [place], [place] - [address] - [phone]  - [phone] FX

## 2020-12-07 DIAGNOSIS — F90.9 ATTENTION DEFICIT HYPERACTIVITY DISORDER (ADHD), UNSPECIFIED ADHD TYPE: ICD-10-CM

## 2020-12-07 NOTE — TELEPHONE ENCOUNTER
Caller: Biju Verduzco    Relationship: Self    Best call back number: [phone]    Medication needed:   Requested Prescriptions     Pending Prescriptions Disp Refills   • methylphenidate (Concerta) 36 MG CR tablet 30 tablet 0     Sig: Take 1 tablet by mouth Every Morning       When do you need the refill by: ASAP  What details did the patient provide when requesting the medication: PATIENT HAS 1 PILL LEFT    Does the patient have less than a 3 day supply:  [x] Yes  [] No    What is the patient's preferred pharmacy: [facility]/PHARMACY #[id number] - [place], [place] - [address] - [phone]  - [phone] FX

## 2020-12-08 RX ORDER — METHYLPHENIDATE HYDROCHLORIDE 36 MG/1
36 TABLET ORAL EVERY MORNING
Qty: 30 TABLET | Refills: 0 | Status: SHIPPED | OUTPATIENT
Start: 2020-12-08 | End: 2020-12-28 | Stop reason: SDUPTHER

## 2020-12-28 ENCOUNTER — PRIOR AUTHORIZATION (OUTPATIENT)
Dept: INTERNAL MEDICINE | Facility: CLINIC | Age: 60
End: 2020-12-28

## 2020-12-28 ENCOUNTER — OFFICE VISIT (OUTPATIENT)
Dept: INTERNAL MEDICINE | Facility: CLINIC | Age: 60
End: 2020-12-28

## 2020-12-28 VITALS
TEMPERATURE: 97.1 F | OXYGEN SATURATION: 100 % | WEIGHT: 192.8 LBS | SYSTOLIC BLOOD PRESSURE: 126 MMHG | BODY MASS INDEX: 27.6 KG/M2 | HEART RATE: 70 BPM | HEIGHT: 70 IN | DIASTOLIC BLOOD PRESSURE: 72 MMHG

## 2020-12-28 DIAGNOSIS — R41.3 SHORT-TERM MEMORY LOSS: Primary | ICD-10-CM

## 2020-12-28 DIAGNOSIS — F90.9 ATTENTION DEFICIT HYPERACTIVITY DISORDER (ADHD), UNSPECIFIED ADHD TYPE: ICD-10-CM

## 2020-12-28 DIAGNOSIS — A53.0 POSITIVE RPR TEST: ICD-10-CM

## 2020-12-28 DIAGNOSIS — R68.82 LIBIDO, DECREASED: ICD-10-CM

## 2020-12-28 PROBLEM — M77.02 EPICONDYLITIS ELBOW, MEDIAL, LEFT: Status: RESOLVED | Noted: 2019-06-04 | Resolved: 2020-12-28

## 2020-12-28 PROBLEM — R07.89 ATYPICAL CHEST PAIN: Status: RESOLVED | Noted: 2019-03-06 | Resolved: 2020-12-28

## 2020-12-28 PROBLEM — R03.0 BORDERLINE SYSTOLIC HTN: Status: RESOLVED | Noted: 2019-12-10 | Resolved: 2020-12-28

## 2020-12-28 PROBLEM — L98.9 SKIN LESION OF SCALP: Status: RESOLVED | Noted: 2020-03-23 | Resolved: 2020-12-28

## 2020-12-28 PROBLEM — G31.84 MILD COGNITIVE IMPAIRMENT WITH MEMORY LOSS: Status: ACTIVE | Noted: 2020-12-28

## 2020-12-28 PROCEDURE — 99214 OFFICE O/P EST MOD 30 MIN: CPT | Performed by: FAMILY MEDICINE

## 2020-12-28 RX ORDER — METHYLPHENIDATE HYDROCHLORIDE EXTENDED RELEASE 20 MG/1
20 TABLET ORAL EVERY MORNING
Qty: 30 TABLET | Refills: 0 | Status: SHIPPED | OUTPATIENT
Start: 2020-12-28 | End: 2021-03-29

## 2020-12-28 RX ORDER — MULTIVIT WITH MINERALS/LUTEIN
250 TABLET ORAL DAILY
COMMUNITY

## 2020-12-28 NOTE — PROGRESS NOTES
Date of Encounter: 2020  Patient: Biju Verduzco,  1960    Subjective   History of Presenting Illness  Chief complaint: Memory loss    Mild cognitive impairment by neuropsychiatric testing in 2019.  Was started on Namenda with no subjective improvement in memory.  Short-term memory most affected, in particular identification of individuals, recent events related to his work and family.  Family history of dementia in mother, possibly sister.  He is unsure whether his long-term prescribed stimulant medication has affected his memory.    ADHD: Diagnosed over 10 years ago, has been on Concerta and managed well.  He has no concerns with this regimen.    Reports decreased libido, erectile dysfunction, interested in testosterone being tested.    Lives with wife.  Never smoker.  6 alcoholic drinks per week.  Does not exercise.  Average diet.  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  Has a living will.  Last colon cancer screening .  Up-to-date on influenza vaccination.    Review of Systems:  Negative for fever, cough, shortness of breath, abdominal pain, headache    The following portions of the patient's history were reviewed and updated as appropriate: allergies, current medications, past family history, past medical history, past social history, past surgical history and problem list.    Patient Active Problem List   Diagnosis   • ED (erectile dysfunction)   • Sleep disorder   • Hypercholesteremia   • Attention deficit hyperactivity disorder (ADHD)   • Atypical chest pain   • Short-term memory loss   • Epicondylitis elbow, medial, left   • Borderline systolic HTN   • Seasonal allergic rhinitis due to pollen   • Skin lesion of scalp   • Mild cognitive impairment with memory loss     Past Medical History:   Diagnosis Date   • ADD (attention deficit disorder)    • Anxiety    • Depression    • ED (erectile dysfunction)    • Erectile dysfunction    • Hypercholesteremia    • Sleep disorder      Past Surgical  "History:   Procedure Laterality Date   • CARDIOVASCULAR STRESS TEST  2013    stress Echo     Family History   Problem Relation Age of Onset   • Hypothyroidism Mother        Current Outpatient Medications:   •  aspirin 81 MG EC tablet, Take 81 mg by mouth daily., Disp: , Rfl:   •  Cholecalciferol (VITAMIN D PO), Take  by mouth daily., Disp: , Rfl:   •  memantine (NAMENDA) 10 MG tablet, TAKE 1 TABLET BY MOUTH TWICE A DAY, Disp: 180 tablet, Rfl: 1  •  methylphenidate ER (METADATE ER) 20 MG CR tablet, Take 1 tablet by mouth Every Morning, Disp: 30 tablet, Rfl: 0  •  Multiple Vitamins-Minerals (MULTIVITAMIN ADULT PO), Take  by mouth daily., Disp: , Rfl:   •  Multiple Vitamins-Minerals (ZINC PO), Take  by mouth., Disp: , Rfl:   •  Thiamine HCl (VITAMIN B-1 PO), Take  by mouth., Disp: , Rfl:   •  vitamin C (ASCORBIC ACID) 250 MG tablet, Take 250 mg by mouth Daily., Disp: , Rfl:   •  vitamin E 200 UNIT capsule, Take 200 Units by mouth daily., Disp: , Rfl:   No Known Allergies  Social History     Tobacco Use   • Smoking status: Never Smoker   Substance Use Topics   • Alcohol use: Yes     Comment: OCC   • Drug use: No          Objective   Physical Exam  Vitals:    12/28/20 1004   BP: 126/72   Pulse: 70   Temp: 97.1 °F (36.2 °C)   TempSrc: Temporal   SpO2: 100%   Weight: 87.5 kg (192 lb 12.8 oz)   Height: 177.8 cm (70\")     Body mass index is 27.66 kg/m².    Constitutional: NAD.  Eyes: EOMI. Normal conjunctiva.  Cardiovascular: RRR. No murmurs. No LE edema b/l. Radial pulses 2+ bilaterally.  Pulmonary: CTA b/l. Good effort.  Integumentary: No rashes or wounds on face or upper extremities.  Lymphatic: No anterior cervical lymphadenopathy.  Endocrine: No thyromegaly or palpable thyroid nodules.  Psychiatric: Normal affect.  Does have difficulty keeping to a single topic without wondering.  Occasional word finding difficulties and stuttering.  Rapid speech pattern.       Assessment/Plan   Assessment and Plan  Pleasant 60-year-old " male with mild cognitive impairment, ADHD, hyperlipidemia, who presents with the following:    Diagnoses and all orders for this visit:    1. Short-term memory loss (Primary): Additional blood work as below to evaluate for other causes of cognitive impairment.  May be related to cognitive dysfunction from long-term stimulant use.  Discussed this with the patient and he is amenable to tapering off with supervision.  We will reduce each dose for the next few refills and follow-up in 3 months.  For now continue Namenda.  -     methylphenidate ER (METADATE ER) 20 MG CR tablet; Take 1 tablet by mouth Every Morning  Dispense: 30 tablet; Refill: 0  -     Thyroid Panel With TSH  -     CBC & Differential  -     Sedimentation Rate  -     RPR  -     HIV-1 / O / 2 Ag / Antibody 4th Generation  -     Testosterone (Free & Total), LC / MS    2. Attention deficit hyperactivity disorder (ADHD), unspecified ADHD type: Per #1  -     methylphenidate ER (METADATE ER) 20 MG CR tablet; Take 1 tablet by mouth Every Morning  Dispense: 30 tablet; Refill: 0    3. Libido, decreased    Follow-up in 3 months for cognitive impairment    Darren Pérez MD  Family Medicine  O: [phone]  C: [phone]    Disclaimer: Parts of this note were dictated by speech recognition. Minor errors in transcription may be present. Please call if questions.

## 2020-12-31 ENCOUNTER — TELEPHONE (OUTPATIENT)
Dept: INTERNAL MEDICINE | Facility: CLINIC | Age: 60
End: 2020-12-31

## 2020-12-31 PROBLEM — A53.0 POSITIVE RPR TEST: Status: ACTIVE | Noted: 2020-12-31

## 2020-12-31 LAB
BASOPHILS # BLD AUTO: 0 X10E3/UL (ref 0–0.2)
BASOPHILS NFR BLD AUTO: 1 %
EOSINOPHIL # BLD AUTO: 0.2 X10E3/UL (ref 0–0.4)
EOSINOPHIL NFR BLD AUTO: 3 %
ERYTHROCYTE [DISTWIDTH] IN BLOOD BY AUTOMATED COUNT: 12.9 % (ref 11.6–15.4)
ERYTHROCYTE [SEDIMENTATION RATE] IN BLOOD BY WESTERGREN METHOD: 15 MM/HR (ref 0–30)
FT4I SERPL CALC-MCNC: 1.8 (ref 1.2–4.9)
HCT VFR BLD AUTO: 41.4 % (ref 37.5–51)
HGB BLD-MCNC: 13.9 G/DL (ref 13–17.7)
HIV 1+2 AB+HIV1 P24 AG SERPL QL IA: NON REACTIVE
IMM GRANULOCYTES # BLD AUTO: 0 X10E3/UL (ref 0–0.1)
IMM GRANULOCYTES NFR BLD AUTO: 0 %
LYMPHOCYTES # BLD AUTO: 1.6 X10E3/UL (ref 0.7–3.1)
LYMPHOCYTES NFR BLD AUTO: 24 %
MCH RBC QN AUTO: 28.9 PG (ref 26.6–33)
MCHC RBC AUTO-ENTMCNC: 33.6 G/DL (ref 31.5–35.7)
MCV RBC AUTO: 86 FL (ref 79–97)
MONOCYTES # BLD AUTO: 0.6 X10E3/UL (ref 0.1–0.9)
MONOCYTES NFR BLD AUTO: 9 %
NEUTROPHILS # BLD AUTO: 4.4 X10E3/UL (ref 1.4–7)
NEUTROPHILS NFR BLD AUTO: 63 %
PLATELET # BLD AUTO: 375 X10E3/UL (ref 150–450)
RBC # BLD AUTO: 4.81 X10E6/UL (ref 4.14–5.8)
RPR SER QL: REACTIVE
RPR SER-TITR: ABNORMAL {TITER}
T3RU NFR SERPL: 27 % (ref 24–39)
T4 SERPL-MCNC: 6.5 UG/DL (ref 4.5–12)
TESTOST FREE SERPL-MCNC: 13 PG/ML (ref 6.6–18.1)
TESTOST SERPL-MCNC: 592.7 NG/DL (ref 264–916)
TSH SERPL DL<=0.005 MIU/L-ACNC: 2.05 UIU/ML (ref 0.45–4.5)
WBC # BLD AUTO: 6.9 X10E3/UL (ref 3.4–10.8)

## 2020-12-31 NOTE — TELEPHONE ENCOUNTER
S/w pt re: lab results. Pt was advised of possible positive syphilis test, and advised to come back to office for more accurate testing. Pt was advised of all other normal lab results. Pt demonstrated understanding, agreement. Appt has been scheduled for labs.

## 2020-12-31 NOTE — TELEPHONE ENCOUNTER
----- Message from Darren Pérez MD sent at 12/31/2020  9:22 AM EST -----  Please call the patient about their results with the following discussion points:    One of his tests for his memory showed a possible positive syphilis test, which would explain his memory problems    I need to confirm this with a second more accurate test to make sure this is correct before we talk about treatment, please have him make an appointment for this    The rest of his blood work was normal

## 2021-01-05 PROBLEM — R76.8 BIOLOGICAL FALSE POSITIVE RPR TEST: Status: ACTIVE | Noted: 2020-12-31

## 2021-01-08 ENCOUNTER — TELEPHONE (OUTPATIENT)
Dept: INTERNAL MEDICINE | Facility: CLINIC | Age: 61
End: 2021-01-08

## 2021-01-08 NOTE — TELEPHONE ENCOUNTER
"----- Message from Darren Pérez MD sent at 1/5/2021 11:08 PM EST -----  Please call the patient about their results with the following discussion points:    Great news is that his syphilis test was a false positive, if anyone ever does this test in the future he needs to know that he has had a false positive \"RPR\" before.  This happens occasionally.    His blood work looks great otherwise, we will keep her current follow-up.  Again, he does not have syphilis and this was a false positive but sometimes occurs with the initial test, the confirmation test showed no syphilis.  "

## 2021-03-29 ENCOUNTER — OFFICE VISIT (OUTPATIENT)
Dept: INTERNAL MEDICINE | Facility: CLINIC | Age: 61
End: 2021-03-29

## 2021-03-29 VITALS
SYSTOLIC BLOOD PRESSURE: 134 MMHG | WEIGHT: 195.2 LBS | TEMPERATURE: 97.7 F | HEART RATE: 74 BPM | BODY MASS INDEX: 27.94 KG/M2 | OXYGEN SATURATION: 97 % | HEIGHT: 70 IN | DIASTOLIC BLOOD PRESSURE: 80 MMHG

## 2021-03-29 DIAGNOSIS — G31.84 MILD COGNITIVE IMPAIRMENT WITH MEMORY LOSS: Primary | ICD-10-CM

## 2021-03-29 DIAGNOSIS — Z12.12 ENCOUNTER FOR SCREENING FOR COLORECTAL MALIGNANT NEOPLASM: ICD-10-CM

## 2021-03-29 DIAGNOSIS — N52.9 ERECTILE DYSFUNCTION, UNSPECIFIED ERECTILE DYSFUNCTION TYPE: ICD-10-CM

## 2021-03-29 DIAGNOSIS — Z12.11 ENCOUNTER FOR SCREENING FOR COLORECTAL MALIGNANT NEOPLASM: ICD-10-CM

## 2021-03-29 DIAGNOSIS — R45.4 IRRITABILITY AND ANGER: ICD-10-CM

## 2021-03-29 PROCEDURE — 99214 OFFICE O/P EST MOD 30 MIN: CPT | Performed by: FAMILY MEDICINE

## 2021-03-29 RX ORDER — TADALAFIL 10 MG/1
10 TABLET ORAL DAILY PRN
Qty: 30 TABLET | Refills: 2 | Status: SHIPPED | OUTPATIENT
Start: 2021-03-29 | End: 2021-10-05 | Stop reason: SDUPTHER

## 2021-03-29 RX ORDER — TADALAFIL 10 MG/1
10 TABLET ORAL DAILY PRN
Qty: 30 TABLET | Refills: 2 | Status: SHIPPED | OUTPATIENT
Start: 2021-03-29 | End: 2021-03-29

## 2021-03-29 NOTE — PROGRESS NOTES
Date of Encounter: 2021  Patient: Biju Verduzco,  1960    Subjective   History of Presenting Illness  Chief complaint: Follow-up memory loss    Patient has stopped his stimulant and noticed some increased irritability but otherwise no changes in his memory or other symptoms.  He would like to stay off of it because he does not want to take any unnecessary medications especially if they were to impair his memory.    He feels his memory loss is essentially unchanged.  Typically a short-term, names, tasks, etc.  Occasionally causes problems at work.  Has not gotten lost.  No serious work, relationship problems because of his memory.  Sometimes forgets to take his evening medication.    Would like to try higher dose of tadalafil.  Was not effective at 5 mg.    Due for colonoscopy    Review of Systems:  Negative for fever, cough, shortness of breath    The following portions of the patient's history were reviewed and updated as appropriate: allergies, current medications, past family history, past medical history, past social history, past surgical history and problem list.    Patient Active Problem List   Diagnosis   • ED (erectile dysfunction)   • Hypercholesteremia   • Attention deficit hyperactivity disorder (ADHD)   • Short-term memory loss   • Seasonal allergic rhinitis due to pollen   • Mild cognitive impairment with memory loss   • Biological false positive RPR test     Past Medical History:   Diagnosis Date   • ADD (attention deficit disorder)    • Anxiety    • Atypical chest pain 3/6/2019   • Borderline systolic HTN 12/10/2019   • Depression    • ED (erectile dysfunction)    • Epicondylitis elbow, medial, left 2019   • Erectile dysfunction    • Hypercholesteremia    • Skin lesion of scalp 3/23/2020    Dermatologic follow-up   • Sleep disorder      Past Surgical History:   Procedure Laterality Date   • CARDIOVASCULAR STRESS TEST  2013    stress Echo     Family History   Problem Relation Age of  "Onset   • Hypothyroidism Mother        Current Outpatient Medications:   •  Cholecalciferol (VITAMIN D PO), Take  by mouth daily., Disp: , Rfl:   •  memantine (NAMENDA) 10 MG tablet, TAKE 1 TABLET BY MOUTH TWICE A DAY, Disp: 180 tablet, Rfl: 1  •  Multiple Vitamins-Minerals (MULTIVITAMIN ADULT PO), Take  by mouth daily., Disp: , Rfl:   •  Multiple Vitamins-Minerals (ZINC PO), Take  by mouth., Disp: , Rfl:   •  Thiamine HCl (VITAMIN B-1 PO), Take  by mouth., Disp: , Rfl:   •  vitamin C (ASCORBIC ACID) 250 MG tablet, Take 250 mg by mouth Daily., Disp: , Rfl:   •  vitamin E 200 UNIT capsule, Take 200 Units by mouth daily., Disp: , Rfl:   •  tadalafil (Cialis) 10 MG tablet, Take 1 tablet by mouth Daily As Needed for Erectile Dysfunction., Disp: 30 tablet, Rfl: 2  No Known Allergies  Social History     Tobacco Use   • Smoking status: Never Smoker   Substance Use Topics   • Alcohol use: Yes     Comment: OCC   • Drug use: No          Objective   Physical Exam  Vitals:    03/29/21 1110   BP: 134/80   Pulse: 74   Temp: 97.7 °F (36.5 °C)   TempSrc: Temporal   SpO2: 97%   Weight: 88.5 kg (195 lb 3.2 oz)   Height: 177.8 cm (70\")     Body mass index is 28.01 kg/m².    Constitutional: NAD.  Eyes: EOMI. PERRLA. Normal conjunctiva.  Ear, nose, mouth, throat: Normal external ear canals and TMs bilaterally.  Cardiovascular: RRR. No murmurs. No LE edema b/l. Radial pulses 2+ bilaterally.  Pulmonary: CTA b/l. Good effort.  Integumentary: No rashes or wounds on face or upper extremities.  Lymphatic: No anterior cervical lymphadenopathy.  Endocrine: No thyromegaly or palpable thyroid nodules.  Psychiatric: Normal affect. Normal thought content.  Neurologic ANO x4.     Assessment/Plan   Assessment and Plan  Pleasant 60-year-old male with mild cognitive impairment, ADHD, hyperlipidemia, who presents with the following:    Diagnoses and all orders for this visit:    1. Mild cognitive impairment with memory loss (Primary): Refer for " neuroimaging which has not been performed.  I do not expect any major findings but would help complete the evaluation as he is already had neurocognitive testing, labs which did confirm mild cognitive impairment.  -     MRI Brain With & Without Contrast; Future    2. Irritability and anger: He has had success with venlafaxine in the past, he declines now but will continue to monitor    3. Erectile dysfunction, unspecified erectile dysfunction type: Discussed risks and benefits of this medicine  -     tadalafil (Cialis) 10 MG tablet; Take 1 tablet by mouth Daily As Needed for Erectile Dysfunction.  Dispense: 30 tablet; Refill: 2    4. Encounter for screening for colorectal malignant neoplasm  -     Ambulatory Referral For Screening Colonoscopy    Follow-up in 6 months for annual physical    Darren Pérez MD  Family Medicine  O: [phone]  C: [phone]    Disclaimer: Parts of this note were dictated by speech recognition. Minor errors in transcription may be present. Please call if questions.

## 2021-04-09 ENCOUNTER — PREP FOR SURGERY (OUTPATIENT)
Dept: SURGERY | Facility: SURGERY CENTER | Age: 61
End: 2021-04-09

## 2021-04-09 DIAGNOSIS — Z12.11 ENCOUNTER FOR SCREENING FOR MALIGNANT NEOPLASM OF COLON: Primary | ICD-10-CM

## 2021-04-09 RX ORDER — SODIUM CHLORIDE 0.9 % (FLUSH) 0.9 %
3 SYRINGE (ML) INJECTION EVERY 12 HOURS SCHEDULED
Status: CANCELLED | OUTPATIENT
Start: 2021-04-09

## 2021-04-09 RX ORDER — SODIUM CHLORIDE 0.9 % (FLUSH) 0.9 %
10 SYRINGE (ML) INJECTION AS NEEDED
Status: CANCELLED | OUTPATIENT
Start: 2021-04-09

## 2021-04-09 RX ORDER — SODIUM CHLORIDE, SODIUM LACTATE, POTASSIUM CHLORIDE, CALCIUM CHLORIDE 600; 310; 30; 20 MG/100ML; MG/100ML; MG/100ML; MG/100ML
30 INJECTION, SOLUTION INTRAVENOUS CONTINUOUS PRN
Status: CANCELLED | OUTPATIENT
Start: 2021-04-09

## 2021-04-12 ENCOUNTER — TRANSCRIBE ORDERS (OUTPATIENT)
Dept: LAB | Facility: SURGERY CENTER | Age: 61
End: 2021-04-12

## 2021-04-12 DIAGNOSIS — Z01.818 OTHER SPECIFIED PRE-OPERATIVE EXAMINATION: Primary | ICD-10-CM

## 2021-04-12 PROBLEM — Z12.11 ENCOUNTER FOR SCREENING FOR MALIGNANT NEOPLASM OF COLON: Status: ACTIVE | Noted: 2021-04-12

## 2021-05-22 ENCOUNTER — APPOINTMENT (OUTPATIENT)
Dept: LAB | Facility: SURGERY CENTER | Age: 61
End: 2021-05-22

## 2021-10-05 ENCOUNTER — OFFICE VISIT (OUTPATIENT)
Dept: INTERNAL MEDICINE | Facility: CLINIC | Age: 61
End: 2021-10-05

## 2021-10-05 VITALS
TEMPERATURE: 97.1 F | DIASTOLIC BLOOD PRESSURE: 80 MMHG | SYSTOLIC BLOOD PRESSURE: 124 MMHG | HEART RATE: 72 BPM | OXYGEN SATURATION: 97 % | WEIGHT: 200.08 LBS | HEIGHT: 70 IN | BODY MASS INDEX: 28.64 KG/M2

## 2021-10-05 DIAGNOSIS — E78.00 HYPERCHOLESTEREMIA: ICD-10-CM

## 2021-10-05 DIAGNOSIS — Z00.00 ANNUAL PHYSICAL EXAM: Primary | ICD-10-CM

## 2021-10-05 DIAGNOSIS — G31.84 MILD COGNITIVE IMPAIRMENT WITH MEMORY LOSS: ICD-10-CM

## 2021-10-05 DIAGNOSIS — N52.9 ERECTILE DYSFUNCTION, UNSPECIFIED ERECTILE DYSFUNCTION TYPE: ICD-10-CM

## 2021-10-05 DIAGNOSIS — Z12.5 SCREENING FOR MALIGNANT NEOPLASM OF PROSTATE: ICD-10-CM

## 2021-10-05 PROBLEM — Z12.11 ENCOUNTER FOR SCREENING FOR MALIGNANT NEOPLASM OF COLON: Status: RESOLVED | Noted: 2021-04-12 | Resolved: 2021-10-05

## 2021-10-05 PROBLEM — R41.3 SHORT-TERM MEMORY LOSS: Status: RESOLVED | Noted: 2019-03-06 | Resolved: 2021-10-05

## 2021-10-05 PROCEDURE — 99396 PREV VISIT EST AGE 40-64: CPT | Performed by: FAMILY MEDICINE

## 2021-10-05 PROCEDURE — 36415 COLL VENOUS BLD VENIPUNCTURE: CPT | Performed by: FAMILY MEDICINE

## 2021-10-05 RX ORDER — TADALAFIL 10 MG/1
10 TABLET ORAL DAILY PRN
Qty: 30 TABLET | Refills: 2 | Status: SHIPPED | OUTPATIENT
Start: 2021-10-05 | End: 2022-10-07

## 2021-10-05 NOTE — PROGRESS NOTES
Venipuncture performed in [facility] by KD with good hemostasis. Patient tolerated well. 10/5/2021 Kary URBANO LPN.

## 2021-10-05 NOTE — PROGRESS NOTES
Date of Encounter: 10/05/2021  Patient: Biju Verduzco,  1960    Subjective   History of Presenting Illness  Chief complaint: Annual physical    No acute concerns.    Mild cognitive impairment by neuropsychiatric testing in 2019.  Was started on Namenda with no subjective improvement in memory.  Short-term memory most affected, in particular identification of individuals, recent events related to his work and family.  Family history of dementia in mother, possibly sister.  Recently we decided to stop the stimulants as I was concerned this may been contributing to his cognitive impairment and he has noticed an improvement in his memory since doing this. The improvement is so much so that he is considering stopping his Namenda     Erectile dysfunction continues to benefit from tadalafil     Lives with wife.  Never smoker.  6 alcoholic drinks per week.  Does not exercise.  Average diet.  Needs to reschedule colonoscopy. 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 Up-to-date on influenza vaccination.  Was going to get Shingrix but insurance did not cover it.    Review of Systems:  Negative for fever, congestion, chest pain upon exertion, shortness of breath, vision changes, vomiting, dysuria, lymphadenopathy, muscle weakness, numbness, mood changes, rashes.    The following portions of the patient's history were reviewed and updated as appropriate: allergies, current medications, past family history, past medical history, past social history, past surgical history and problem list.    Patient Active Problem List   Diagnosis   • ED (erectile dysfunction)   • Hypercholesteremia   • Attention deficit hyperactivity disorder (ADHD)   • Seasonal allergic rhinitis due to pollen   • Mild cognitive impairment with memory loss   • Biological false positive RPR test   • Irritability and anger     Past Medical History:   Diagnosis Date   • ADD (attention deficit disorder)    • Anxiety    • Atypical chest pain  "3/6/2019   • Borderline systolic HTN 12/10/2019   • Depression    • ED (erectile dysfunction)    • Epicondylitis elbow, medial, left 6/4/2019   • Erectile dysfunction    • Hypercholesteremia    • Skin lesion of scalp 3/23/2020    Dermatologic follow-up   • Sleep disorder      Past Surgical History:   Procedure Laterality Date   • CARDIOVASCULAR STRESS TEST  2013    stress Echo     Family History   Problem Relation Age of Onset   • Hypothyroidism Mother        Current Outpatient Medications:   •  Cholecalciferol (VITAMIN D PO), Take  by mouth daily., Disp: , Rfl:   •  Multiple Vitamins-Minerals (MULTIVITAMIN ADULT PO), Take  by mouth daily., Disp: , Rfl:   •  Multiple Vitamins-Minerals (ZINC PO), Take  by mouth., Disp: , Rfl:   •  Thiamine HCl (VITAMIN B-1 PO), Take  by mouth., Disp: , Rfl:   •  vitamin C (ASCORBIC ACID) 250 MG tablet, Take 250 mg by mouth Daily., Disp: , Rfl:   •  vitamin E 200 UNIT capsule, Take 200 Units by mouth daily., Disp: , Rfl:   •  tadalafil (Cialis) 10 MG tablet, Take 1 tablet by mouth Daily As Needed for Erectile Dysfunction., Disp: 30 tablet, Rfl: 2  No Known Allergies  Social History     Tobacco Use   • Smoking status: Never Smoker   Substance Use Topics   • Alcohol use: Yes     Comment: OCC   • Drug use: No          Objective   Physical Exam  Vitals:    10/05/21 1122   BP: 124/80   BP Location: Right arm   Patient Position: Sitting   Cuff Size: Adult   Pulse: 72   Temp: 97.1 °F (36.2 °C)   TempSrc: Temporal   SpO2: 97%   Weight: 90.8 kg (200 lb 1.3 oz)   Height: 177.8 cm (70\")     Body mass index is 28.71 kg/m².    Constitutional: NAD.  Eyes: EOMI. PERRLA. Normal conjunctiva.  Ear, nose, mouth, throat: Normal external ear canals and TMs bilaterally.  Cardiovascular: RRR. No murmurs. No LE edema b/l. Radial pulses 2+ bilaterally.  Pulmonary: CTA b/l. Good effort.  Integumentary: No rashes or wounds on face or upper extremities.  Lymphatic: No anterior cervical " lymphadenopathy.  Endocrine: No thyromegaly or palpable thyroid nodules.  Psychiatric: Normal affect. Normal thought content.  Gastrointestinal: Nondistended. No hepatosplenomegaly. No focal tenderness to palpation. Normal bowel sounds.       Assessment/Plan   Assessment and Plan  Pleasant 61-year-old male with mild cognitive impairment, ADHD, hyperlipidemia, who presents with the following:    Diagnoses and all orders for this visit:    1. Annual physical exam (Primary): We discussed preventative care including age and patient-appropriate immunizations and cancer screening. We also discussed the importance of exercise and a healthy diet. This is their annual preventative exam.  Needs to reschedule colonoscopy.    2. Mild cognitive impairment with memory loss: Improving since cessation of the stimulant.  Continue to monitor.    3. Erectile dysfunction, unspecified erectile dysfunction type: Controlled  -     tadalafil (Cialis) 10 MG tablet; Take 1 tablet by mouth Daily As Needed for Erectile Dysfunction.  Dispense: 30 tablet; Refill: 2    4. Hypercholesteremia  -     Lipid Panel  -     Comprehensive Metabolic Panel  -     Thyroid Panel With TSH    5. Screening for malignant neoplasm of prostate  -     PSA Screen    Return to office in 1 year for annual physical or earlier as needed.    Darren Pérez MD  Family Medicine  O: [phone]  C: [phone]    Disclaimer: Parts of this note were dictated by speech recognition. Minor errors in transcription may be present. Please call if questions.

## 2021-10-06 LAB
ALBUMIN SERPL-MCNC: 4.5 G/DL (ref 3.8–4.8)
ALBUMIN/GLOB SERPL: 1.6 {RATIO} (ref 1.2–2.2)
ALP SERPL-CCNC: 84 IU/L (ref 44–121)
ALT SERPL-CCNC: 24 IU/L (ref 0–44)
AST SERPL-CCNC: 24 IU/L (ref 0–40)
BILIRUB SERPL-MCNC: 0.4 MG/DL (ref 0–1.2)
BUN SERPL-MCNC: 17 MG/DL (ref 8–27)
BUN/CREAT SERPL: 20 (ref 10–24)
CALCIUM SERPL-MCNC: 9.6 MG/DL (ref 8.6–10.2)
CHLORIDE SERPL-SCNC: 103 MMOL/L (ref 96–106)
CHOLEST SERPL-MCNC: 209 MG/DL (ref 100–199)
CO2 SERPL-SCNC: 24 MMOL/L (ref 20–29)
CREAT SERPL-MCNC: 0.83 MG/DL (ref 0.76–1.27)
FT4I SERPL CALC-MCNC: 1.9 (ref 1.2–4.9)
GLOBULIN SER CALC-MCNC: 2.9 G/DL (ref 1.5–4.5)
GLUCOSE SERPL-MCNC: 89 MG/DL (ref 65–99)
HDLC SERPL-MCNC: 40 MG/DL
LDLC SERPL CALC-MCNC: 133 MG/DL (ref 0–99)
POTASSIUM SERPL-SCNC: 5.1 MMOL/L (ref 3.5–5.2)
PROT SERPL-MCNC: 7.4 G/DL (ref 6–8.5)
PSA SERPL-MCNC: 0.4 NG/ML (ref 0–4)
SODIUM SERPL-SCNC: 140 MMOL/L (ref 134–144)
T3RU NFR SERPL: 27 % (ref 24–39)
T4 SERPL-MCNC: 6.9 UG/DL (ref 4.5–12)
TRIGL SERPL-MCNC: 198 MG/DL (ref 0–149)
TSH SERPL DL<=0.005 MIU/L-ACNC: 3.4 UIU/ML (ref 0.45–4.5)
VLDLC SERPL CALC-MCNC: 36 MG/DL (ref 5–40)

## 2021-10-06 NOTE — PROGRESS NOTES
Overall your blood work looks great with the exception of your cholesterol.    High cholesterol increases your risk of heart disease and stroke.  Using a risk calculator, your chance of having a heart attack or stroke over the next 10 years is about 11%. Regular exercise and avoiding fatty foods and red meat can improve your cholesterol, but usually not enough at your level.     I recommend that you start a statin medication because these medications reduce this risk of heart attack and stroke by half. Sometimes they cause side effects like muscle aches and fatigue, but usually if we start with a low dose we can find a brand of statin that works well for you and does not cause these problems.     If you are interested in starting this medication for your cholesterol please let me know by MyChart or phone and I will call it in to the pharmacy. If you have any questions please don't hesitate to contact me here.

## 2022-10-07 ENCOUNTER — OFFICE VISIT (OUTPATIENT)
Dept: INTERNAL MEDICINE | Facility: CLINIC | Age: 62
End: 2022-10-07

## 2022-10-07 VITALS
DIASTOLIC BLOOD PRESSURE: 82 MMHG | HEART RATE: 73 BPM | SYSTOLIC BLOOD PRESSURE: 132 MMHG | WEIGHT: 200.2 LBS | OXYGEN SATURATION: 97 % | BODY MASS INDEX: 28.73 KG/M2

## 2022-10-07 DIAGNOSIS — J30.1 SEASONAL ALLERGIC RHINITIS DUE TO POLLEN: ICD-10-CM

## 2022-10-07 DIAGNOSIS — E78.00 HYPERCHOLESTEREMIA: ICD-10-CM

## 2022-10-07 DIAGNOSIS — Z23 NEEDS FLU SHOT: ICD-10-CM

## 2022-10-07 DIAGNOSIS — Z13.1 SCREENING FOR DIABETES MELLITUS: ICD-10-CM

## 2022-10-07 DIAGNOSIS — Z00.00 ANNUAL PHYSICAL EXAM: Primary | ICD-10-CM

## 2022-10-07 DIAGNOSIS — Z12.12 ENCOUNTER FOR SCREENING FOR COLORECTAL MALIGNANT NEOPLASM: ICD-10-CM

## 2022-10-07 DIAGNOSIS — G31.84 MILD COGNITIVE IMPAIRMENT WITH MEMORY LOSS: ICD-10-CM

## 2022-10-07 DIAGNOSIS — Z12.11 ENCOUNTER FOR SCREENING FOR COLORECTAL MALIGNANT NEOPLASM: ICD-10-CM

## 2022-10-07 DIAGNOSIS — Z12.5 SCREENING FOR MALIGNANT NEOPLASM OF PROSTATE: ICD-10-CM

## 2022-10-07 DIAGNOSIS — Z23 NEED FOR COVID-19 VACCINE: ICD-10-CM

## 2022-10-07 DIAGNOSIS — N52.9 ERECTILE DYSFUNCTION, UNSPECIFIED ERECTILE DYSFUNCTION TYPE: ICD-10-CM

## 2022-10-07 PROCEDURE — 91312 COVID-19 (PFIZER) BIVALENT BOOSTER 12+YRS: CPT | Performed by: FAMILY MEDICINE

## 2022-10-07 PROCEDURE — 0124A PR ADM SARSCOV2 30MCG/0.3ML BST: CPT | Performed by: FAMILY MEDICINE

## 2022-10-07 PROCEDURE — 99396 PREV VISIT EST AGE 40-64: CPT | Performed by: FAMILY MEDICINE

## 2022-10-07 PROCEDURE — 90471 IMMUNIZATION ADMIN: CPT | Performed by: FAMILY MEDICINE

## 2022-10-07 PROCEDURE — 90686 IIV4 VACC NO PRSV 0.5 ML IM: CPT | Performed by: FAMILY MEDICINE

## 2022-10-07 RX ORDER — MEMANTINE HYDROCHLORIDE 10 MG/1
10 TABLET ORAL DAILY
Qty: 90 TABLET | Refills: 3 | Status: SHIPPED | OUTPATIENT
Start: 2022-10-07 | End: 2022-10-17

## 2022-10-07 NOTE — PROGRESS NOTES
Date of Encounter: 10/07/2022  Patient: Biju Verduzco,  1960    Subjective   History of Presenting Illness  Chief complaint: Annual physical    Patient is concerned that his cognitive impairment is worsening.  This time last year he felt that his cognitive impairment was doing much better after he stopped his stimulant, at that time we also decided to stop his Namenda.  He has not made any other recent lifestyle or medication changes.  Over the past year he has had difficulty with recalling details of conversations, following conversations, has occasionally gotten lost while driving. This has even caused problems at work. he has a strong family history of dementia in his mother and sister.  Last neuropsychiatric testing in 2019.  He did not follow-up for the MRI of the brain.  He has not seen a neurologist.      Erectile dysfunction did benefit from tadalafil but it was very expensive.  Not interested in another medication at this time.    Seasonal allergic rhinitis with recent sinusitis that is resolving with cetirizine and Sudafed as needed.     Lives with wife. Never smoker. 6 alcoholic drinks per week.  Does not exercise.  Average diet.  Needs to reschedule colonoscopy. 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  Has a living will.  Amenable to COVID-19 vaccine today.    Review of Systems:  Negative for fever, congestion, chest pain upon exertion, shortness of breath, vision changes, vomiting, dysuria, lymphadenopathy, muscle weakness, numbness, mood changes, rashes.    The following portions of the patient's history were reviewed and updated as appropriate: allergies, current medications, past family history, past medical history, past social history, past surgical history and problem list.    Patient Active Problem List   Diagnosis   • ED (erectile dysfunction)   • Hypercholesteremia   • Attention deficit hyperactivity disorder (ADHD)   • Seasonal allergic rhinitis due to pollen   • Mild cognitive impairment  with memory loss   • Biological false positive RPR test   • Irritability and anger     Past Medical History:   Diagnosis Date   • ADD (attention deficit disorder)    • Anxiety    • Atypical chest pain 3/6/2019   • Borderline systolic HTN 12/10/2019   • Depression    • ED (erectile dysfunction)    • Epicondylitis elbow, medial, left 6/4/2019   • Erectile dysfunction    • Hypercholesteremia    • Skin lesion of scalp 3/23/2020    Dermatologic follow-up   • Sleep disorder      Past Surgical History:   Procedure Laterality Date   • CARDIOVASCULAR STRESS TEST  2013    stress Echo     Family History   Problem Relation Age of Onset   • Hypothyroidism Mother        Current Outpatient Medications:   •  Cholecalciferol (VITAMIN D PO), Take  by mouth daily., Disp: , Rfl:   •  memantine (NAMENDA) 10 MG tablet, Take 1 tablet by mouth Daily., Disp: 90 tablet, Rfl: 3  •  Multiple Vitamins-Minerals (MULTIVITAMIN ADULT PO), Take  by mouth daily., Disp: , Rfl:   •  Multiple Vitamins-Minerals (ZINC PO), Take  by mouth., Disp: , Rfl:   •  Thiamine HCl (VITAMIN B-1 PO), Take  by mouth., Disp: , Rfl:   •  vitamin C (ASCORBIC ACID) 250 MG tablet, Take 250 mg by mouth Daily., Disp: , Rfl:   •  vitamin E 200 UNIT capsule, Take 200 Units by mouth daily., Disp: , Rfl:   No Known Allergies  Social History     Tobacco Use   • Smoking status: Never Smoker   Substance Use Topics   • Alcohol use: Yes     Comment: OCC   • Drug use: No          Objective   Physical Exam  Vitals:    10/07/22 0916   BP: 132/82   Pulse: 73   SpO2: 97%   Weight: 90.8 kg (200 lb 3.2 oz)     Body mass index is 28.73 kg/m².    Constitutional: NAD.  Eyes: EOMI. PERRLA. Normal conjunctiva.  Ear, nose, mouth, throat: No tonsillar exudates or erythema.   Normal nasal mucosa. Normal external ear canals and TMs bilaterally.  Cardiovascular: RRR. No murmurs. No LE edema b/l. Radial pulses 2+ bilaterally.  Pulmonary: CTA b/l. Good effort.  Integumentary: No rashes or wounds on face  or upper extremities.  Lymphatic: No anterior cervical lymphadenopathy.  Endocrine: No thyromegaly or palpable thyroid nodules.  Psychiatric: Anxious affect.  He does repeat several phrases excessively over the course of our interview.  He is able to recall details of previous conversations and events without difficulty.  Gastrointestinal: Nondistended. No hepatosplenomegaly. No focal tenderness to palpation. Normal bowel sounds.     Assessment & Plan   Assessment and Plan  Pleasant 62-year-old male with mild cognitive impairment, ADHD, hyperlipidemia, seasonal allergic rhinitis, who presents with the following:    Diagnoses and all orders for this visit:    1. Annual physical exam (Primary): We discussed preventative care including age and patient-appropriate immunizations and cancer screening. We also discussed the importance of exercise and a healthy diet. This is their annual preventative exam.    2. Mild cognitive impairment with memory loss: Worsening cognitive impairment.  Because this worsened after we stopped Namenda I want him to restart it, we will reorder the MRI of the brain, labs as below we will also get him to neurology due to strong family history and worsening cognitive impairment.  -     Vitamin D 25 Hydroxy  -     Vitamin B12  -     Urinalysis With Microscopic - Urine, Clean Catch  -     Sedimentation Rate  -     MRI Brain With & Without Contrast  -     Ambulatory Referral to Neurology  -     memantine (NAMENDA) 10 MG tablet; Take 1 tablet by mouth Daily.  Dispense: 90 tablet; Refill: 3    3. Hypercholesteremia  -     CBC & Differential  -     Comprehensive Metabolic Panel  -     Lipid Panel  -     Thyroid Panel With TSH    4. Erectile dysfunction, unspecified erectile dysfunction type    5. Seasonal allergic rhinitis due to pollen: Stable on over-the-counter medicines as needed    6. Screening for malignant neoplasm of prostate  -     PSA Screen    7. Encounter for screening for colorectal  malignant neoplasm  -     Ambulatory Referral For Screening Colonoscopy    8. Screening for diabetes mellitus  -     Hemoglobin A1c    9. Need for COVID-19 vaccine  -     COVID-19 Bivalent Booster (Pfizer) 12+yrs    10. Needs flu shot  -     FluLaval/Fluzone >6 mos ([id number])    We will follow-up in 3 months for cognitive impairment    Darren Pérez MD  Family Medicine  O: [phone]  C: [phone]    Disclaimer: Parts of this note were dictated by speech recognition. Minor errors in transcription may be present. Please call if questions.

## 2022-10-08 LAB
25(OH)D3+25(OH)D2 SERPL-MCNC: 38.2 NG/ML (ref 30–100)
ALBUMIN SERPL-MCNC: 4.4 G/DL (ref 3.8–4.8)
ALBUMIN/GLOB SERPL: 1.4 {RATIO} (ref 1.2–2.2)
ALP SERPL-CCNC: 90 IU/L (ref 44–121)
ALT SERPL-CCNC: 22 IU/L (ref 0–44)
APPEARANCE UR: CLEAR
AST SERPL-CCNC: 23 IU/L (ref 0–40)
BACTERIA #/AREA URNS HPF: NORMAL /[HPF]
BASOPHILS # BLD AUTO: 0.1 X10E3/UL (ref 0–0.2)
BASOPHILS NFR BLD AUTO: 1 %
BILIRUB SERPL-MCNC: 0.4 MG/DL (ref 0–1.2)
BILIRUB UR QL STRIP: NEGATIVE
BUN SERPL-MCNC: 19 MG/DL (ref 8–27)
BUN/CREAT SERPL: 21 (ref 10–24)
CALCIUM SERPL-MCNC: 9.6 MG/DL (ref 8.6–10.2)
CASTS URNS QL MICRO: NORMAL /LPF
CHLORIDE SERPL-SCNC: 102 MMOL/L (ref 96–106)
CHOLEST SERPL-MCNC: 199 MG/DL (ref 100–199)
CO2 SERPL-SCNC: 22 MMOL/L (ref 20–29)
COLOR UR: YELLOW
CREAT SERPL-MCNC: 0.89 MG/DL (ref 0.76–1.27)
EGFRCR SERPLBLD CKD-EPI 2021: 97 ML/MIN/1.73
EOSINOPHIL # BLD AUTO: 0.3 X10E3/UL (ref 0–0.4)
EOSINOPHIL NFR BLD AUTO: 4 %
EPI CELLS #/AREA URNS HPF: NORMAL /HPF (ref 0–10)
ERYTHROCYTE [DISTWIDTH] IN BLOOD BY AUTOMATED COUNT: 13.2 % (ref 11.6–15.4)
ERYTHROCYTE [SEDIMENTATION RATE] IN BLOOD BY WESTERGREN METHOD: 23 MM/HR (ref 0–30)
FT4I SERPL CALC-MCNC: 2 (ref 1.2–4.9)
GLOBULIN SER CALC-MCNC: 3.2 G/DL (ref 1.5–4.5)
GLUCOSE SERPL-MCNC: 103 MG/DL (ref 70–99)
GLUCOSE UR QL STRIP: NEGATIVE
HBA1C MFR BLD: 5.9 % (ref 4.8–5.6)
HCT VFR BLD AUTO: 43.5 % (ref 37.5–51)
HDLC SERPL-MCNC: 34 MG/DL
HGB BLD-MCNC: 13.8 G/DL (ref 13–17.7)
HGB UR QL STRIP: NEGATIVE
IMM GRANULOCYTES # BLD AUTO: 0 X10E3/UL (ref 0–0.1)
IMM GRANULOCYTES NFR BLD AUTO: 0 %
KETONES UR QL STRIP: NEGATIVE
LDLC SERPL CALC-MCNC: 109 MG/DL (ref 0–99)
LEUKOCYTE ESTERASE UR QL STRIP: NEGATIVE
LYMPHOCYTES # BLD AUTO: 1.9 X10E3/UL (ref 0.7–3.1)
LYMPHOCYTES NFR BLD AUTO: 25 %
MCH RBC QN AUTO: 27.5 PG (ref 26.6–33)
MCHC RBC AUTO-ENTMCNC: 31.7 G/DL (ref 31.5–35.7)
MCV RBC AUTO: 87 FL (ref 79–97)
MICRO URNS: NORMAL
MICRO URNS: NORMAL
MONOCYTES # BLD AUTO: 0.8 X10E3/UL (ref 0.1–0.9)
MONOCYTES NFR BLD AUTO: 10 %
NEUTROPHILS # BLD AUTO: 4.5 X10E3/UL (ref 1.4–7)
NEUTROPHILS NFR BLD AUTO: 60 %
NITRITE UR QL STRIP: NEGATIVE
PH UR STRIP: 5.5 [PH] (ref 5–7.5)
PLATELET # BLD AUTO: 382 X10E3/UL (ref 150–450)
POTASSIUM SERPL-SCNC: 4.4 MMOL/L (ref 3.5–5.2)
PROT SERPL-MCNC: 7.6 G/DL (ref 6–8.5)
PROT UR QL STRIP: NEGATIVE
PSA SERPL-MCNC: 0.4 NG/ML (ref 0–4)
RBC # BLD AUTO: 5.02 X10E6/UL (ref 4.14–5.8)
RBC #/AREA URNS HPF: NORMAL /HPF (ref 0–2)
SODIUM SERPL-SCNC: 140 MMOL/L (ref 134–144)
SP GR UR STRIP: 1.03 (ref 1–1.03)
T3RU NFR SERPL: 28 % (ref 24–39)
T4 SERPL-MCNC: 7.2 UG/DL (ref 4.5–12)
TRIGL SERPL-MCNC: 324 MG/DL (ref 0–149)
TSH SERPL DL<=0.005 MIU/L-ACNC: 3.18 UIU/ML (ref 0.45–4.5)
UROBILINOGEN UR STRIP-MCNC: 0.2 MG/DL (ref 0.2–1)
VIT B12 SERPL-MCNC: 1669 PG/ML (ref 232–1245)
VLDLC SERPL CALC-MCNC: 56 MG/DL (ref 5–40)
WBC # BLD AUTO: 7.5 X10E3/UL (ref 3.4–10.8)
WBC #/AREA URNS HPF: NORMAL /HPF (ref 0–5)

## 2022-10-10 PROBLEM — R73.03 PREDIABETES: Status: ACTIVE | Noted: 2022-10-10

## 2022-10-10 NOTE — PROGRESS NOTES
Overall your blood work looks good except that your blood sugar is mildly elevated and your cholesterol remains high.  I do not recommend anything for the blood sugar but we should continue to follow it every 6 to 12 months.    For the cholesterol, your cholesterol has remained high.  In the past we have recommended cholesterol lowering medication to reduce your risk of heart attack and stroke.  Your current risk is about 14% and a statin would reduce this by about half.  They can cause muscle aches and fatigue but usually people tolerate them well.  If you do want to try this medication please send me a message or call my office and I can send it to your pharmacy.    I do not see anything contributing to your memory problems which is why I think that MRI and neurology referral is important.

## 2022-10-17 ENCOUNTER — OFFICE VISIT (OUTPATIENT)
Dept: NEUROLOGY | Facility: CLINIC | Age: 62
End: 2022-10-17

## 2022-10-17 VITALS
DIASTOLIC BLOOD PRESSURE: 80 MMHG | WEIGHT: 200 LBS | BODY MASS INDEX: 28.63 KG/M2 | HEIGHT: 70 IN | OXYGEN SATURATION: 96 % | SYSTOLIC BLOOD PRESSURE: 134 MMHG | HEART RATE: 76 BPM

## 2022-10-17 DIAGNOSIS — G31.84 MILD COGNITIVE IMPAIRMENT: Primary | ICD-10-CM

## 2022-10-17 PROCEDURE — 99204 OFFICE O/P NEW MOD 45 MIN: CPT | Performed by: PSYCHIATRY & NEUROLOGY

## 2022-10-17 RX ORDER — DONEPEZIL HYDROCHLORIDE 5 MG/1
5 TABLET, FILM COATED ORAL DAILY
Qty: 30 TABLET | Refills: 5 | Status: SHIPPED | OUTPATIENT
Start: 2022-10-17 | End: 2022-12-08

## 2022-10-17 RX ORDER — MEMANTINE HYDROCHLORIDE 10 MG/1
10 TABLET ORAL 2 TIMES DAILY
Qty: 30 TABLET | Refills: 2 | Status: SHIPPED | OUTPATIENT
Start: 2022-10-17 | End: 2022-12-12 | Stop reason: SDUPTHER

## 2022-10-17 RX ORDER — DONEPEZIL HYDROCHLORIDE 10 MG/1
10 TABLET, FILM COATED ORAL DAILY
Qty: 30 TABLET | Refills: 2 | Status: SHIPPED | OUTPATIENT
Start: 2022-10-17 | End: 2023-01-12

## 2022-10-17 NOTE — PROGRESS NOTES
Chief Complaint   Patient presents with   • Memory Loss       Patient ID: Biju Verduzco is a 62 y.o. male.    HPI: I had the pleasure of seeing your patient today.  As we know he is a 62-year-old gentleman here for evaluation for memory loss.  He is being seen at the request of and referred to us by Dr. Pérez.  The patient says that several years ago he started noticing some forgetfulness.  He says that he questioned whether his forgetfulness was due to stress however things became more apparently wrong in that way and he became more concerned.  He says that he started having a significant amount of word finding difficulties.  He says that he would get stuck in conversation and was unable to continue.  He would forget what he was trying to say.  Other times he would know what he was trying to say but could not find the word.  He says that he started forgetting conversations quite easily.  He even acknowledges difficulties remembering people that he would meet.  Not necessarily forgetting their names but forgetting that he had actually met them.  He started noticing that he would get agitated easily.  He was also sometimes becoming confused while driving.  He does acknowledge a family history of memory loss.  He says that his mother had dementia as well as his paternal grandmother.  He says that he acknowledges at this point his sister is having more trouble with her memory as well.  He is not sure if she has a formal diagnosis of dementia however.  He does have an MRI scheduled of the brain.  He also has had neuropsych testing showing evidence for mild cognitive impairment, amnestic type.  He does still work full-time and is company with several stores.  He has not had any issues with that thus far.    The following portions of the patient's history were reviewed and updated as appropriate: allergies, current medications, past family history, past medical history, past social history, past surgical history and  problem list.    Review of Systems   Constitutional: Positive for fatigue.   HENT: Negative for dental problem, facial swelling, sinus pressure and sinus pain.    Eyes: Negative for visual disturbance.   Respiratory: Negative for chest tightness and shortness of breath.    Cardiovascular: Negative for chest pain, palpitations and leg swelling.   Gastrointestinal: Negative for constipation, diarrhea, nausea and vomiting.   Genitourinary: Negative for difficulty urinating, frequency and urgency.   Musculoskeletal: Negative for back pain, gait problem, joint swelling, neck pain and neck stiffness.   Neurological: Positive for tremors. Negative for dizziness, seizures, syncope, speech difficulty, weakness, light-headedness, numbness and headaches.   Hematological: Does not bruise/bleed easily.   Psychiatric/Behavioral: Positive for confusion and decreased concentration. Negative for agitation, behavioral problems, hallucinations, self-injury, sleep disturbance and suicidal ideas. The patient is nervous/anxious.       I have reviewed the review of systems above performed by my medical assistant.      Vitals:    10/17/22 0938   BP: 134/80   Pulse: 76   SpO2: 96%       Neurologic Exam     Mental Status   Oriented to person, place, and time.   Registration: recalls 3 of 3 objects. Follows 3 step commands.   Attention: normal. Concentration: normal.   Speech: speech is normal   Level of consciousness: alert  Knowledge: consistent with education (No deficits found.).   Normal comprehension.     Cranial Nerves     CN II   Visual fields full to confrontation.     CN III, IV, VI   Pupils are equal, round, and reactive to light.  Extraocular motions are normal.   CN III: no CN III palsy  CN VI: no CN VI palsy  Nystagmus: none   Diplopia: none    CN V   Facial sensation intact.     CN VII   Facial expression full, symmetric.     CN VIII   CN VIII normal.     CN IX, X   CN IX normal.   CN X normal.     CN XI   CN XI normal.     CN  XII   CN XII normal.     Motor Exam   Muscle bulk: normal  Right arm tone: normal  Left arm tone: normal  Right leg tone: normal  Left leg tone: normal    Strength   Right neck flexion: 5/5  Left neck flexion: 5/5  Right neck extension: 5/5  Left neck extension: 5/5  Right deltoid: 5/5  Left deltoid: 5/5  Right biceps: 5/5  Left biceps: 5/5  Right triceps: 5/5  Left triceps: 5/5  Right wrist flexion: 5/5  Left wrist flexion: 5/5  Right wrist extension: 5/5  Left wrist extension: 5/5  Right interossei: 5/5  Left interossei: 5/5  Right abdominals: 5/5  Left abdominals: 5/5  Right iliopsoas: 5/5  Left iliopsoas: 5/5  Right quadriceps: 5/5  Left quadriceps: 5/5  Right hamstrin/5  Left hamstrin/5  Right glutei: 5/5  Left glutei: 5/5  Right anterior tibial: 5/5  Left anterior tibial: 5/5  Right posterior tibial: 5/5  Left posterior tibial: 5/5  Right peroneal: 5/5  Left peroneal: 5/5  Right gastroc: 5/5  Left gastroc: 5/5    Sensory Exam   Light touch normal.   Vibration normal.   Proprioception normal.   Pinprick normal.     Gait, Coordination, and Reflexes     Gait  Gait: normal    Coordination   Romberg: negative    Tremor   Resting tremor: absent  Intention tremor: absent    Reflexes   Right brachioradialis: 2+  Left brachioradialis: 2+  Right biceps: 2+  Left biceps: 2+  Right triceps: 2+  Left triceps: 2+  Right patellar: 2+  Left patellar: 2+  Right achilles: 2+  Left achilles: 2+  Right : 2+  Left : 2+Station is normal.       Physical Exam  Vitals reviewed.   Constitutional:       General: He is not in acute distress.     Appearance: He is well-developed.   HENT:      Head: Normocephalic and atraumatic.   Eyes:      Extraocular Movements: EOM normal.      Pupils: Pupils are equal, round, and reactive to light.   Cardiovascular:      Rate and Rhythm: Normal rate and regular rhythm.      Heart sounds: Normal heart sounds.   Pulmonary:      Effort: Pulmonary effort is normal. No respiratory distress.       Breath sounds: Normal breath sounds.   Abdominal:      General: Bowel sounds are normal. There is no distension.      Palpations: Abdomen is soft.      Tenderness: There is no abdominal tenderness.   Musculoskeletal:         General: No deformity.      Cervical back: Normal range of motion.   Skin:     General: Skin is warm.      Findings: No rash.   Neurological:      Mental Status: He is oriented to person, place, and time.      Coordination: Romberg Test normal.      Gait: Gait is intact.      Deep Tendon Reflexes:      Reflex Scores:       Tricep reflexes are 2+ on the right side and 2+ on the left side.       Bicep reflexes are 2+ on the right side and 2+ on the left side.       Brachioradialis reflexes are 2+ on the right side and 2+ on the left side.       Patellar reflexes are 2+ on the right side and 2+ on the left side.       Achilles reflexes are 2+ on the right side and 2+ on the left side.  Psychiatric:         Speech: Speech normal.         Judgment: Judgment normal.         Procedures    Assessment/Plan: I would like to schedule him for follow-up neuropsych testing since it has been approximately 3 years since his last evaluation in 2019.  He is taking memantine however only 10 mg daily.  I would like to switch this prescription to 10 mg twice daily.  I have also talked to him about Aricept titration to 10 mg daily.  We will see him back in approximately 4 months or sooner if needed.       Diagnoses and all orders for this visit:    1. Mild cognitive impairment (Primary)  -     memantine (NAMENDA) 10 MG tablet; Take 1 tablet by mouth 2 (Two) Times a Day.  Dispense: 30 tablet; Refill: 2  -     donepezil (ARICEPT) 5 MG tablet; Take 1 tablet by mouth Daily for 21 days.  Dispense: 30 tablet; Refill: 5  -     donepezil (Aricept) 10 MG tablet; Take 1 tablet by mouth Daily.  Dispense: 30 tablet; Refill: 2  -     Ambulatory Referral to Neuropsychology           Ethan Leary II, MD

## 2022-10-24 RX ORDER — ATORVASTATIN CALCIUM 10 MG/1
TABLET, FILM COATED ORAL
Qty: 90 TABLET | Refills: 3 | Status: SHIPPED | OUTPATIENT
Start: 2022-10-24

## 2022-10-24 NOTE — PROGRESS NOTES
Spoke to pt about providers message and recommendations, pt states he is agreeable to starting medication for cholesterol at this time.

## 2022-10-28 ENCOUNTER — PATIENT ROUNDING (BHMG ONLY) (OUTPATIENT)
Dept: NEUROLOGY | Facility: CLINIC | Age: 62
End: 2022-10-28

## 2022-11-02 ENCOUNTER — HOSPITAL ENCOUNTER (OUTPATIENT)
Dept: MRI IMAGING | Facility: HOSPITAL | Age: 62
Discharge: HOME | End: 2022-11-02
Admitting: FAMILY MEDICINE

## 2022-11-02 PROCEDURE — 70553 MRI BRAIN STEM W/O & W/DYE: CPT

## 2022-11-02 PROCEDURE — 0 GADOBENATE DIMEGLUMINE 529 MG/ML SOLUTION: Performed by: FAMILY MEDICINE

## 2022-11-02 PROCEDURE — A9577 INJ MULTIHANCE: HCPCS | Performed by: FAMILY MEDICINE

## 2022-11-02 RX ADMIN — GADOBENATE DIMEGLUMINE 19 ML: 529 INJECTION, SOLUTION INTRAVENOUS at 11:06

## 2022-11-14 ENCOUNTER — TELEPHONE (OUTPATIENT)
Dept: GASTROENTEROLOGY | Facility: CLINIC | Age: 62
End: 2022-11-14

## 2022-12-07 ENCOUNTER — PREP FOR SURGERY (OUTPATIENT)
Dept: SURGERY | Facility: SURGERY CENTER | Age: 62
End: 2022-12-07

## 2022-12-07 DIAGNOSIS — Z12.11 ENCOUNTER FOR SCREENING FOR MALIGNANT NEOPLASM OF COLON: Primary | ICD-10-CM

## 2022-12-07 RX ORDER — SODIUM CHLORIDE, SODIUM LACTATE, POTASSIUM CHLORIDE, CALCIUM CHLORIDE 600; 310; 30; 20 MG/100ML; MG/100ML; MG/100ML; MG/100ML
30 INJECTION, SOLUTION INTRAVENOUS CONTINUOUS PRN
Status: CANCELLED | OUTPATIENT
Start: 2022-12-07

## 2022-12-08 ENCOUNTER — APPOINTMENT (OUTPATIENT)
Dept: GENERAL RADIOLOGY | Facility: HOSPITAL | Age: 62
End: 2022-12-08

## 2022-12-08 ENCOUNTER — APPOINTMENT (OUTPATIENT)
Dept: MRI IMAGING | Facility: HOSPITAL | Age: 62
End: 2022-12-08

## 2022-12-08 ENCOUNTER — HOSPITAL ENCOUNTER (OUTPATIENT)
Facility: HOSPITAL | Age: 62
Setting detail: OBSERVATION
Discharge: HOME | End: 2022-12-09
Attending: EMERGENCY MEDICINE | Admitting: EMERGENCY MEDICINE

## 2022-12-08 ENCOUNTER — APPOINTMENT (OUTPATIENT)
Dept: CT IMAGING | Facility: HOSPITAL | Age: 62
End: 2022-12-08

## 2022-12-08 DIAGNOSIS — R42 VERTIGO: Primary | ICD-10-CM

## 2022-12-08 LAB
ABO GROUP BLD: NORMAL
ALBUMIN SERPL-MCNC: 4.8 G/DL (ref 3.5–5.2)
ALBUMIN/GLOB SERPL: 1.5 G/DL
ALP SERPL-CCNC: 94 U/L (ref 39–117)
ALT SERPL W P-5'-P-CCNC: 35 U/L (ref 1–41)
ANION GAP SERPL CALCULATED.3IONS-SCNC: 12.1 MMOL/L (ref 5–15)
APTT PPP: 28 SECONDS (ref 22.7–35.4)
AST SERPL-CCNC: 29 U/L (ref 1–40)
BASOPHILS # BLD AUTO: 0.07 10*3/MM3 (ref 0–0.2)
BASOPHILS NFR BLD AUTO: 0.7 % (ref 0–1.5)
BILIRUB SERPL-MCNC: 0.4 MG/DL (ref 0–1.2)
BLD GP AB SCN SERPL QL: NEGATIVE
BUN SERPL-MCNC: 21 MG/DL (ref 8–23)
BUN/CREAT SERPL: 20 (ref 7–25)
CALCIUM SPEC-SCNC: 9.8 MG/DL (ref 8.6–10.5)
CHLORIDE SERPL-SCNC: 100 MMOL/L (ref 98–107)
CO2 SERPL-SCNC: 25.9 MMOL/L (ref 22–29)
CREAT SERPL-MCNC: 1.05 MG/DL (ref 0.76–1.27)
DEPRECATED RDW RBC AUTO: 39.9 FL (ref 37–54)
EGFRCR SERPLBLD CKD-EPI 2021: 80.3 ML/MIN/1.73
EOSINOPHIL # BLD AUTO: 0.19 10*3/MM3 (ref 0–0.4)
EOSINOPHIL NFR BLD AUTO: 1.9 % (ref 0.3–6.2)
ERYTHROCYTE [DISTWIDTH] IN BLOOD BY AUTOMATED COUNT: 13 % (ref 12.3–15.4)
GLOBULIN UR ELPH-MCNC: 3.2 GM/DL
GLUCOSE BLDC GLUCOMTR-MCNC: 107 MG/DL (ref 70–130)
GLUCOSE SERPL-MCNC: 109 MG/DL (ref 65–99)
HCT VFR BLD AUTO: 44 % (ref 37.5–51)
HGB BLD-MCNC: 14.4 G/DL (ref 13–17.7)
HOLD SPECIMEN: NORMAL
HOLD SPECIMEN: NORMAL
IMM GRANULOCYTES # BLD AUTO: 0.04 10*3/MM3 (ref 0–0.05)
IMM GRANULOCYTES NFR BLD AUTO: 0.4 % (ref 0–0.5)
INR PPP: 0.94 (ref 0.9–1.1)
LYMPHOCYTES # BLD AUTO: 2.87 10*3/MM3 (ref 0.7–3.1)
LYMPHOCYTES NFR BLD AUTO: 29.4 % (ref 19.6–45.3)
MCH RBC QN AUTO: 27.9 PG (ref 26.6–33)
MCHC RBC AUTO-ENTMCNC: 32.7 G/DL (ref 31.5–35.7)
MCV RBC AUTO: 85.3 FL (ref 79–97)
MONOCYTES # BLD AUTO: 0.74 10*3/MM3 (ref 0.1–0.9)
MONOCYTES NFR BLD AUTO: 7.6 % (ref 5–12)
NEUTROPHILS NFR BLD AUTO: 5.84 10*3/MM3 (ref 1.7–7)
NEUTROPHILS NFR BLD AUTO: 60 % (ref 42.7–76)
NRBC BLD AUTO-RTO: 0 /100 WBC (ref 0–0.2)
PLATELET # BLD AUTO: 345 10*3/MM3 (ref 140–450)
PMV BLD AUTO: 9.9 FL (ref 6–12)
POTASSIUM SERPL-SCNC: 4.4 MMOL/L (ref 3.5–5.2)
PROT SERPL-MCNC: 8 G/DL (ref 6–8.5)
PROTHROMBIN TIME: 12.7 SECONDS (ref 11.7–14.2)
QT INTERVAL: 382 MS
RBC # BLD AUTO: 5.16 10*6/MM3 (ref 4.14–5.8)
RH BLD: POSITIVE
SODIUM SERPL-SCNC: 138 MMOL/L (ref 136–145)
T&S EXPIRATION DATE: NORMAL
TROPONIN T SERPL-MCNC: <0.01 NG/ML (ref 0–0.03)
WBC NRBC COR # BLD: 9.75 10*3/MM3 (ref 3.4–10.8)
WHOLE BLOOD HOLD COAG: NORMAL
WHOLE BLOOD HOLD SPECIMEN: NORMAL

## 2022-12-08 PROCEDURE — 99284 EMERGENCY DEPT VISIT MOD MDM: CPT

## 2022-12-08 PROCEDURE — 0042T HC CT CEREBRAL PERFUSION W/WO CONTRAST: CPT

## 2022-12-08 PROCEDURE — 80053 COMPREHEN METABOLIC PANEL: CPT | Performed by: EMERGENCY MEDICINE

## 2022-12-08 PROCEDURE — 70498 CT ANGIOGRAPHY NECK: CPT

## 2022-12-08 PROCEDURE — 70553 MRI BRAIN STEM W/O & W/DYE: CPT

## 2022-12-08 PROCEDURE — G0378 HOSPITAL OBSERVATION PER HR: HCPCS

## 2022-12-08 PROCEDURE — 70496 CT ANGIOGRAPHY HEAD: CPT

## 2022-12-08 PROCEDURE — 84484 ASSAY OF TROPONIN QUANT: CPT | Performed by: EMERGENCY MEDICINE

## 2022-12-08 PROCEDURE — 96374 THER/PROPH/DIAG INJ IV PUSH: CPT

## 2022-12-08 PROCEDURE — 0 GADOBENATE DIMEGLUMINE 529 MG/ML SOLUTION: Performed by: EMERGENCY MEDICINE

## 2022-12-08 PROCEDURE — 0 IOPAMIDOL PER 1 ML: Performed by: EMERGENCY MEDICINE

## 2022-12-08 PROCEDURE — 86901 BLOOD TYPING SEROLOGIC RH(D): CPT | Performed by: EMERGENCY MEDICINE

## 2022-12-08 PROCEDURE — 93005 ELECTROCARDIOGRAM TRACING: CPT | Performed by: EMERGENCY MEDICINE

## 2022-12-08 PROCEDURE — 86850 RBC ANTIBODY SCREEN: CPT | Performed by: EMERGENCY MEDICINE

## 2022-12-08 PROCEDURE — 82962 GLUCOSE BLOOD TEST: CPT

## 2022-12-08 PROCEDURE — 93010 ELECTROCARDIOGRAM REPORT: CPT | Performed by: INTERNAL MEDICINE

## 2022-12-08 PROCEDURE — 99214 OFFICE O/P EST MOD 30 MIN: CPT | Performed by: PSYCHIATRY & NEUROLOGY

## 2022-12-08 PROCEDURE — 85730 THROMBOPLASTIN TIME PARTIAL: CPT | Performed by: EMERGENCY MEDICINE

## 2022-12-08 PROCEDURE — 86900 BLOOD TYPING SEROLOGIC ABO: CPT | Performed by: EMERGENCY MEDICINE

## 2022-12-08 PROCEDURE — 85025 COMPLETE CBC W/AUTO DIFF WBC: CPT | Performed by: EMERGENCY MEDICINE

## 2022-12-08 PROCEDURE — 85610 PROTHROMBIN TIME: CPT | Performed by: EMERGENCY MEDICINE

## 2022-12-08 PROCEDURE — A9577 INJ MULTIHANCE: HCPCS | Performed by: EMERGENCY MEDICINE

## 2022-12-08 PROCEDURE — 71045 X-RAY EXAM CHEST 1 VIEW: CPT

## 2022-12-08 PROCEDURE — 25010000002 KETOROLAC TROMETHAMINE PER 15 MG: Performed by: NURSE PRACTITIONER

## 2022-12-08 PROCEDURE — 82565 ASSAY OF CREATININE: CPT

## 2022-12-08 RX ORDER — MEMANTINE HYDROCHLORIDE 10 MG/1
10 TABLET ORAL 2 TIMES DAILY
Status: DISCONTINUED | OUTPATIENT
Start: 2022-12-08 | End: 2022-12-09 | Stop reason: HOSPADM

## 2022-12-08 RX ORDER — ATORVASTATIN CALCIUM 10 MG/1
10 TABLET, FILM COATED ORAL DAILY
Status: DISCONTINUED | OUTPATIENT
Start: 2022-12-08 | End: 2022-12-09 | Stop reason: HOSPADM

## 2022-12-08 RX ORDER — DIAZEPAM 5 MG/1
5 TABLET ORAL EVERY 6 HOURS PRN
Status: DISCONTINUED | OUTPATIENT
Start: 2022-12-08 | End: 2022-12-09 | Stop reason: HOSPADM

## 2022-12-08 RX ORDER — ASPIRIN 81 MG/1
81 TABLET, CHEWABLE ORAL DAILY
Status: DISCONTINUED | OUTPATIENT
Start: 2022-12-08 | End: 2022-12-09 | Stop reason: HOSPADM

## 2022-12-08 RX ORDER — ASCORBIC ACID 500 MG
250 TABLET ORAL DAILY
Status: DISCONTINUED | OUTPATIENT
Start: 2022-12-08 | End: 2022-12-09 | Stop reason: HOSPADM

## 2022-12-08 RX ORDER — MULTIPLE VITAMINS W/ MINERALS TAB 9MG-400MCG
1 TAB ORAL DAILY
Status: DISCONTINUED | OUTPATIENT
Start: 2022-12-08 | End: 2022-12-09 | Stop reason: HOSPADM

## 2022-12-08 RX ORDER — DONEPEZIL HYDROCHLORIDE 10 MG/1
10 TABLET, FILM COATED ORAL DAILY
Status: DISCONTINUED | OUTPATIENT
Start: 2022-12-08 | End: 2022-12-09 | Stop reason: HOSPADM

## 2022-12-08 RX ORDER — SODIUM CHLORIDE 0.9 % (FLUSH) 0.9 %
10 SYRINGE (ML) INJECTION AS NEEDED
Status: DISCONTINUED | OUTPATIENT
Start: 2022-12-08 | End: 2022-12-09 | Stop reason: HOSPADM

## 2022-12-08 RX ORDER — KETOROLAC TROMETHAMINE 30 MG/ML
30 INJECTION, SOLUTION INTRAMUSCULAR; INTRAVENOUS ONCE
Status: COMPLETED | OUTPATIENT
Start: 2022-12-08 | End: 2022-12-08

## 2022-12-08 RX ADMIN — SODIUM CHLORIDE 1000 ML: 9 INJECTION, SOLUTION INTRAVENOUS at 17:09

## 2022-12-08 RX ADMIN — MEMANTINE HYDROCHLORIDE 10 MG: 10 TABLET, FILM COATED ORAL at 21:43

## 2022-12-08 RX ADMIN — GADOBENATE DIMEGLUMINE 18 ML: 529 INJECTION, SOLUTION INTRAVENOUS at 20:01

## 2022-12-08 RX ADMIN — IOPAMIDOL 150 ML: 755 INJECTION, SOLUTION INTRAVENOUS at 15:10

## 2022-12-08 RX ADMIN — DIAZEPAM 5 MG: 5 TABLET ORAL at 18:42

## 2022-12-08 RX ADMIN — KETOROLAC TROMETHAMINE 30 MG: 30 INJECTION, SOLUTION INTRAMUSCULAR; INTRAVENOUS at 17:09

## 2022-12-08 NOTE — PROGRESS NOTES
Clinical Pharmacy Services: Medication History    Biju Verduzco is a 62 y.o. male presenting to [facility] for   Chief Complaint   Patient presents with   • Dizziness       He  has a past medical history of ADD (attention deficit disorder), Anxiety, Atypical chest pain (3/6/2019), Borderline systolic HTN (12/10/2019), Depression, ED (erectile dysfunction), Epicondylitis elbow, medial, left (6/4/2019), Erectile dysfunction, Hypercholesteremia, Skin lesion of scalp (3/23/2020), and Sleep disorder.    Allergies as of 12/08/2022   • (No Known Allergies)       Medication information was obtained from: Patient   Pharmacy and Phone Number:     Prior to Admission Medications     Prescriptions Last Dose Informant Patient Reported? Taking?    ASPIRIN 81 PO 12/8/2022 Self Yes Yes    Take 1 tablet by mouth Daily.    atorvastatin (LIPITOR) 10 MG tablet 12/8/2022 Self No Yes    Take 1 tab PO QD for cholesterol and heart health    Patient taking differently:  Take 10 mg by mouth Daily.    Cholecalciferol (VITAMIN D PO) 12/8/2022 Self Yes Yes    Take 1 tablet by mouth Daily.    donepezil (Aricept) 10 MG tablet 12/8/2022 Self No Yes    Take 1 tablet by mouth Daily.    memantine (NAMENDA) 10 MG tablet 12/8/2022 Self No Yes    Take 1 tablet by mouth 2 (Two) Times a Day.    Multiple Vitamins-Minerals (MULTIVITAMIN ADULT PO) 12/8/2022 Self Yes Yes    Take 1 tablet by mouth Daily.    Multiple Vitamins-Minerals (ZINC PO) 12/8/2022 Self Yes Yes    Take 1 tablet by mouth Daily.    Thiamine HCl (VITAMIN B-1 PO) 12/8/2022 Self Yes Yes    Take 1 tablet by mouth Daily.    vitamin C (ASCORBIC ACID) 250 MG tablet 12/8/2022 Self Yes Yes    Take 250 mg by mouth Daily.    vitamin E 200 UNIT capsule 12/8/2022 Self Yes Yes    Take 200 Units by mouth daily.            Medication notes:     This medication list is complete to the best of my knowledge as of 12/8/2022    Please call if questions.    Kevin Burciaga  Medication History  Technician  [phone]    12/8/2022 15:47 EST

## 2022-12-08 NOTE — ED TRIAGE NOTES
.Pt masked on arrival, staff masked    Pt reports nausea, dizzy, headache, unsteady gait, balance feels off, trouble putting words together that started on waking this am, states felt fine last night (went to bed around 10pm)

## 2022-12-08 NOTE — CONSULTS
Neurology consult Note    Patient Name: Biju Verduzco   MRN: [id number]  Age: 62 y.o.  Sex: male  : 1960    Primary Care Physician: Darren Pérez MD  Referring Physician:  Tito Quezada MD      Handedness: Right  Race: White    Chief Complaint/Reason for Consultation: Dizziness and nausea    Subjective .  HPI: 62-year-old right-handed white male with known diagnosis of hyperlipidemia, short-term memory loss, anxiety, who woke up this morning with dizziness, but went to work where the coworkers noted him to have imbalance walking for which she was brought to the hospital.  He also had associated nausea, but no vomiting.  His symptoms are worse when he moves.  Denies any focal weakness/numbness/slurred speech/double vision.  Nursing the ER thought that he had some left facial asymmetry.  Denies any similar symptoms in the past.      Review of Systems   Gastrointestinal: Positive for nausea.   Neurological: Positive for dizziness.   All other systems reviewed and are negative.     Past Medical History:   Diagnosis Date   • ADD (attention deficit disorder)    • Anxiety    • Atypical chest pain 3/6/2019   • Borderline systolic HTN 12/10/2019   • Depression    • ED (erectile dysfunction)    • Epicondylitis elbow, medial, left 2019   • Erectile dysfunction    • Hypercholesteremia    • Skin lesion of scalp 3/23/2020    Dermatologic follow-up   • Sleep disorder      Past Surgical History:   Procedure Laterality Date   • CARDIOVASCULAR STRESS TEST  2013    stress Echo     Family History   Problem Relation Age of Onset   • Hypothyroidism Mother      Social History     Socioeconomic History   • Marital status:    Tobacco Use   • Smoking status: Never   Vaping Use   • Vaping Use: Never used   Substance and Sexual Activity   • Alcohol use: Yes     Comment: OCC   • Drug use: No   • Sexual activity: Defer     No Known Allergies  Prior to Admission medications    Medication Sig Start Date End Date Taking?  Authorizing Provider   ASPIRIN 81 PO Take  by mouth Daily.   Yes Venkatesh Botello MD   atorvastatin (LIPITOR) 10 MG tablet Take 1 tab PO QD for cholesterol and heart health 10/24/22   Darren Pérez MD   Cholecalciferol (VITAMIN D PO) Take  by mouth daily.    Venkatesh Botello MD   donepezil (Aricept) 10 MG tablet Take 1 tablet by mouth Daily. 10/17/22 10/17/23  Ethan Leary II, MD   donepezil (ARICEPT) 5 MG tablet Take 1 tablet by mouth Daily for 21 days. 10/17/22 11/7/22  Ethan Leary II, MD   memantine (NAMENDA) 10 MG tablet Take 1 tablet by mouth 2 (Two) Times a Day. 10/17/22 10/17/23  Ethan Leary II, MD   Multiple Vitamins-Minerals (MULTIVITAMIN ADULT PO) Take  by mouth daily.    Venkatesh Botello MD   Multiple Vitamins-Minerals (ZINC PO) Take  by mouth.    Venkatesh Botello MD   Thiamine HCl (VITAMIN B-1 PO) Take  by mouth.    Venkatesh Botello MD   vitamin C (ASCORBIC ACID) 250 MG tablet Take 250 mg by mouth Daily.    Venkatesh Botello MD   vitamin E 200 UNIT capsule Take 200 Units by mouth daily.    Venkatesh Botello MD             Objective     Temp:  [95.7 °F (35.4 °C)] 95.7 °F (35.4 °C)  Heart Rate:  [75-81] 81  Resp:  [18] 18  BP: (167-171)/() 167/93  Neurological Exam  Mental Status  Awake, alert and oriented to person, place and time.Alert. Speech is normal. Language is fluent with no aphasia. Attention and concentration are normal.    Cranial Nerves  CN II: Visual fields full to confrontation.  CN III, IV, VI: Extraocular movements intact bilaterally. Normal lids and orbits bilaterally. Pupils equal round and reactive to light bilaterally.  CN V: Facial sensation is normal.  CN VII: Full and symmetric facial movement.  CN IX, X: Palate elevates symmetrically. Normal gag reflex.  CN XI: Shoulder shrug strength is normal.  CN XII: Tongue midline without atrophy or fasciculations.    Motor  Normal muscle bulk throughout. No fasciculations present. Normal muscle  tone. Strength is 5/5 throughout all four extremities.    Sensory  Light touch is normal in upper and lower extremities.     Coordination    No dysmetria in upper or lower extremity.    Gait    Not assessed.      Physical Exam  Vitals and nursing note reviewed.   Constitutional:       Appearance: Normal appearance.   HENT:      Head: Normocephalic and atraumatic.      Mouth/Throat:      Mouth: Mucous membranes are moist.      Pharynx: Oropharynx is clear.   Eyes:      General: Lids are normal.      Extraocular Movements: Extraocular movements intact.      Pupils: Pupils are equal, round, and reactive to light.   Cardiovascular:      Rate and Rhythm: Normal rate and regular rhythm.   Pulmonary:      Effort: Pulmonary effort is normal. No respiratory distress.   Musculoskeletal:      Cervical back: Normal range of motion and neck supple.   Neurological:      Mental Status: He is alert.      Motor: Motor strength is normal.   Psychiatric:         Mood and Affect: Mood normal.         Speech: Speech normal.         Behavior: Behavior normal.           Hospital Meds:  Scheduled-    Infusions-     PRNs- •  sodium chloride      Results Reviewed:  I have personally reviewed current lab, radiology, and data   CT head shows no acute changes  CT angiogram of head and neck and CT perfusion is negative for any acute findings  Labs were reviewed            Assessment/Plan:      1. Vertigo.  Patient likely has peripheral vertigo, which is benign and positional.  His CT/CTA/CTP looks okay.  Recommend treating his vertigo symptomatically with Valium and meclizine as needed, and if his symptoms still persist, recommend MRI brain later tonight.  Observe him for a day, and if his symptom improves, he can be discharged tomorrow.  2. Hypercholesterolemia.  Continue his home dose of statins.  3. Short-term memory loss/mild cognitive impairment.  Has been started on Namenda and Aricept.  Can continue home dose.  4. Increase activity as  tolerated.    Case was discussed with patient, his wife, nursing and the ER team.  We will sign off for now, please call us if any questions.  Thank you for the consult.          Gustavo Allen MD  December 8, 2022  15:41 EST

## 2022-12-08 NOTE — H&P
[facility]   HISTORY AND PHYSICAL    Patient Name: Biju Verduzco  : 1960  MRN: [id number]  Primary Care Physician:  Darren Pérez MD  Date of admission: 2022    Subjective   Subjective     Chief Complaint:   Chief Complaint   Patient presents with   • Dizziness         HPI:    Biju Verduzco is a pleasant afebrile ambulatory 62 y.o.  male with a past medical history of hypercholesterolemia, borderline hypertension, anxiety and depression.    He presents emergency department [facility] today with complaint of dizziness.  States he woke up with this at 8 AM today and has been progressively worsening throughout the day.  He describes it is his disequilibrium.  It is associated with a moderate generalized headache.  States he has never had an episode of dizziness like this previously.    Patient was seen in the emergency department by neurology service, Dr. Allen who recommends CTA head and neck and trial with Valium to see if this alleviates some of his symptoms.  If it is not improving MD recommends MRI brain to rule out stroke.      Review of Systems   All systems were reviewed and negative except for: Dizziness    Personal History     Past Medical History:   Diagnosis Date   • ADD (attention deficit disorder)    • Anxiety    • Atypical chest pain 3/6/2019   • Borderline systolic HTN 12/10/2019   • Depression    • ED (erectile dysfunction)    • Epicondylitis elbow, medial, left 2019   • Erectile dysfunction    • Hypercholesteremia    • Skin lesion of scalp 3/23/2020    Dermatologic follow-up   • Sleep disorder        Past Surgical History:   Procedure Laterality Date   • CARDIOVASCULAR STRESS TEST  2013    stress Echo       Family History: family history includes Hypothyroidism in his mother. Otherwise pertinent FHx was reviewed and not pertinent to current issue.    Social History:  reports that he has never smoked. He does not have any smokeless tobacco history on file.  He reports current alcohol use. He reports that he does not use drugs.    Home Medications:  Aspirin, Multiple Vitamins-Minerals, Thiamine HCl, Vitamin D, atorvastatin, donepezil, memantine, multivitamin with minerals, vitamin C, and vitamin E    Allergies:  No Known Allergies    Objective   Objective     Vitals:   Temp:  [95.7 °F (35.4 °C)] 95.7 °F (35.4 °C)  Heart Rate:  [75-81] 81  Resp:  [18] 18  BP: (167-171)/() 167/93  Physical Exam    Constitutional: Awake, alert   Eyes: PERRLA, sclerae anicteric, no conjunctival injection   HENT: NCAT, mucous membranes moist   Neck: Supple, no thyromegaly, no lymphadenopathy, trachea midline   Respiratory: Clear to auscultation bilaterally, nonlabored respirations    Cardiovascular: RRR, no murmurs, rubs, or gallops, palpable pedal pulses bilaterally   Gastrointestinal: Positive bowel sounds, soft, nontender, nondistended   Musculoskeletal: No bilateral ankle edema, no clubbing or cyanosis to extremities   Psychiatric: Flat affect, cooperative   Neurologic: Oriented x 3, strength symmetric in all extremities, Cranial Nerves grossly intact to confrontation, speech clear   Skin: No rashes     Result Review    Result Review:  I have personally reviewed the results from the time of this admission to 12/8/2022 15:44 EST and agree with these findings:  [x]  Laboratory list / accordion  []  Microbiology  [x]  Radiology  []  EKG/Telemetry   []  Cardiology/Vascular   []  Pathology  []  Old records  []  Other:  Most notable findings include: Negative troponin, blood glucose 109, CTA head neck does not show any obvious acute abnormalities, chest x-ray clear,      Assessment & Plan   Assessment / Plan     Brief Patient Summary:  Biju Verduzco is a 62 y.o. male who is being evaluated for disequilibrium.  He advised ED staff that he was having trouble walking and reported some subjective left-sided weakness as well as headache.  He denies this being the worst headache of his life  and I think subarachnoid hemorrhages fairly unlikely.  He is neurovascularly intact he does seem motivated to get home soon as possible and I think MRI neuroimaging would be reasonable based on CTAs being somewhat limited.  We will trial patient with Valium and in the meantime given fluids and Toradol for his headache.  I would like to give him a migraine cocktail however I do not want to give him too many sedatives at 1 time so we will reevaluate him after his MRI to see how he is feeling certainly this could represent vertigo or an atypical migraine, CVA posterior circulation is certainly thought to be possible but less likely    Active Hospital Problems:  Active Hospital Problems    Diagnosis    • **Dizziness      Plan:     Dizziness  Consulted neurology  Consulted physical therapy  CTA head neck negative for acute intracranial findings  MRI brain pending      DVT prophylaxis:  Mechanical DVT prophylaxis orders are present.    CODE STATUS:    Code Status (Patient has no pulse and is not breathing): CPR (Attempt to Resuscitate)  Medical Interventions (Patient has pulse or is breathing): Full Support    Admission Status:  I believe this patient meets observation status.    Electronically signed by ERIKA Gipson, 12/08/22, 3:44 PM EST.         I have worn appropriate PPE during this patient encounter, sanitized my hands both with entering and exiting patient's room.

## 2022-12-08 NOTE — ED PROVIDER NOTES
EMERGENCY DEPARTMENT ENCOUNTER    Room Number:  115/1  PCP: Darren Pérez MD  Historian: Patient  History Limited By: Nothing      HPI  Chief Complaint: Dizziness  Context: Biju Verduzco is a 62 y.o. male who presents to the ED c/o dizziness.  Patient states he went to bed normal last night at 10.  Patient woke up this morning and states he felt dizzy and lightheaded.  Patient did make it to work in which she was told he was having trouble with his speech.  Patient was having trouble walking as well.  Patient does think he is got a little bit of weakness on the left side.  Patient has headache.  Has had no nausea or vomiting.      Location: Dizziness  Radiation: N/A  Character: Vertigo  Duration: Last known normal last night at 10 PM  Severity: Moderate  Progression: Unchanged  Aggravating Factors: Walking  Alleviating Factors: Nothing        MEDICAL RECORD REVIEW    Patient with some short-term memory loss according to epic          PAST MEDICAL HISTORY  Active Ambulatory Problems     Diagnosis Date Noted   • ED (erectile dysfunction)    • Hypercholesteremia    • Attention deficit hyperactivity disorder (ADHD) 12/14/2016   • Seasonal allergic rhinitis due to pollen 03/23/2020   • Mild cognitive impairment with memory loss 12/28/2020   • Biological false positive RPR test 12/31/2020   • Irritability and anger 03/29/2021   • Prediabetes 10/10/2022     Resolved Ambulatory Problems     Diagnosis Date Noted   • Sleep disorder    • Anxiety    • Depression    • Atypical chest pain 03/06/2019   • Short-term memory loss 03/06/2019   • Epicondylitis elbow, medial, left 06/04/2019   • Borderline systolic HTN 12/10/2019   • Skin lesion of scalp 03/23/2020   • Encounter for screening for malignant neoplasm of colon 04/12/2021     Past Medical History:   Diagnosis Date   • ADD (attention deficit disorder)    • Erectile dysfunction          PAST SURGICAL HISTORY  Past Surgical History:   Procedure Laterality Date   •  CARDIOVASCULAR STRESS TEST  2013    stress Echo         FAMILY HISTORY  Family History   Problem Relation Age of Onset   • Hypothyroidism Mother          SOCIAL HISTORY  Social History     Socioeconomic History   • Marital status:    Tobacco Use   • Smoking status: Never   Vaping Use   • Vaping Use: Never used   Substance and Sexual Activity   • Alcohol use: Yes     Comment: OCC   • Drug use: No   • Sexual activity: Defer         ALLERGIES  Patient has no known allergies.        REVIEW OF SYSTEMS  Review of Systems   Constitutional: Negative for activity change, appetite change and fever.   HENT: Negative for congestion and sore throat.    Eyes: Negative.    Respiratory: Negative for cough and shortness of breath.    Cardiovascular: Negative for chest pain and leg swelling.   Gastrointestinal: Negative for abdominal pain, diarrhea and vomiting.   Endocrine: Negative.    Genitourinary: Negative for decreased urine volume and dysuria.   Musculoskeletal: Negative for neck pain.   Skin: Negative for rash and wound.   Allergic/Immunologic: Negative.    Neurological: Positive for dizziness, light-headedness and headaches. Negative for weakness and numbness.   Hematological: Negative.    Psychiatric/Behavioral: Negative.    All other systems reviewed and are negative.           PHYSICAL EXAM  ED Triage Vitals   Temp Heart Rate Resp BP SpO2   12/08/22 1418 12/08/22 1418 12/08/22 1418 12/08/22 1433 12/08/22 1418   95.7 °F (35.4 °C) 75 18 (!) 171/103 97 %      Temp src Heart Rate Source Patient Position BP Location FiO2 (%)   -- -- -- -- --              Physical Exam  Vitals and nursing note reviewed.   Constitutional:       General: He is not in acute distress.  HENT:      Head: Normocephalic and atraumatic.   Eyes:      Extraocular Movements: EOM normal.      Pupils: Pupils are equal, round, and reactive to light.   Cardiovascular:      Rate and Rhythm: Normal rate and regular rhythm.      Heart sounds: Normal heart  sounds.   Pulmonary:      Effort: Pulmonary effort is normal. No respiratory distress.      Breath sounds: Normal breath sounds.   Abdominal:      Palpations: Abdomen is soft.      Tenderness: There is no abdominal tenderness. There is no guarding or rebound.   Musculoskeletal:         General: No edema. Normal range of motion.      Cervical back: Normal range of motion and neck supple.   Skin:     General: Skin is warm and dry.   Neurological:      Mental Status: He is alert and oriented to person, place, and time.      Sensory: Sensation is intact.      Motor: Motor strength is normal. Weakness present.      Coordination: Coordination abnormal.   Psychiatric:         Mood and Affect: Mood and affect normal.       nihi 5    Patient was wearing a face mask when I entered the room and they continued to wear a mask throughout their stay in the ED.  I wore PPE, including  gloves, face mask with shield or face mask with goggles whenever I was in the room with patient.     LAB RESULTS  Recent Results (from the past 24 hour(s))   POC Glucose Once    Collection Time: 12/08/22  2:29 PM    Specimen: Blood   Result Value Ref Range    Glucose 107 70 - 130 mg/dL   Comprehensive Metabolic Panel    Collection Time: 12/08/22  2:38 PM    Specimen: Blood   Result Value Ref Range    Glucose 109 (H) 65 - 99 mg/dL    BUN 21 8 - 23 mg/dL    Creatinine 1.05 0.76 - 1.27 mg/dL    Sodium 138 136 - 145 mmol/L    Potassium 4.4 3.5 - 5.2 mmol/L    Chloride 100 98 - 107 mmol/L    CO2 25.9 22.0 - 29.0 mmol/L    Calcium 9.8 8.6 - 10.5 mg/dL    Total Protein 8.0 6.0 - 8.5 g/dL    Albumin 4.80 3.50 - 5.20 g/dL    ALT (SGPT) 35 1 - 41 U/L    AST (SGOT) 29 1 - 40 U/L    Alkaline Phosphatase 94 39 - 117 U/L    Total Bilirubin 0.4 0.0 - 1.2 mg/dL    Globulin 3.2 gm/dL    A/G Ratio 1.5 g/dL    BUN/Creatinine Ratio 20.0 7.0 - 25.0    Anion Gap 12.1 5.0 - 15.0 mmol/L    eGFR 80.3 >60.0 mL/min/1.73   Protime-INR    Collection Time: 12/08/22  2:38 PM     Specimen: Blood   Result Value Ref Range    Protime 12.7 11.7 - 14.2 Seconds    INR 0.94 0.90 - 1.10   aPTT    Collection Time: 12/08/22  2:38 PM    Specimen: Blood   Result Value Ref Range    PTT 28.0 22.7 - 35.4 seconds   Troponin    Collection Time: 12/08/22  2:38 PM    Specimen: Blood   Result Value Ref Range    Troponin T <0.010 0.000 - 0.030 ng/mL   Type & Screen    Collection Time: 12/08/22  2:38 PM    Specimen: Blood   Result Value Ref Range    ABO Type O     RH type Positive     Antibody Screen Negative     T&S Expiration Date 12/11/2022 11:59:59 PM    Green Top (Gel)    Collection Time: 12/08/22  2:38 PM   Result Value Ref Range    Extra Tube Hold for add-ons.    Lavender Top    Collection Time: 12/08/22  2:38 PM   Result Value Ref Range    Extra Tube hold for add-on    Gold Top - SST    Collection Time: 12/08/22  2:38 PM   Result Value Ref Range    Extra Tube Hold for add-ons.    Light Blue Top    Collection Time: 12/08/22  2:38 PM   Result Value Ref Range    Extra Tube Hold for add-ons.    CBC Auto Differential    Collection Time: 12/08/22  2:38 PM    Specimen: Blood   Result Value Ref Range    WBC 9.75 3.40 - 10.80 10*3/mm3    RBC 5.16 4.14 - 5.80 10*6/mm3    Hemoglobin 14.4 13.0 - 17.7 g/dL    Hematocrit 44.0 37.5 - 51.0 %    MCV 85.3 79.0 - 97.0 fL    MCH 27.9 26.6 - 33.0 pg    MCHC 32.7 31.5 - 35.7 g/dL    RDW 13.0 12.3 - 15.4 %    RDW-SD 39.9 37.0 - 54.0 fl    MPV 9.9 6.0 - 12.0 fL    Platelets 345 140 - 450 10*3/mm3    Neutrophil % 60.0 42.7 - 76.0 %    Lymphocyte % 29.4 19.6 - 45.3 %    Monocyte % 7.6 5.0 - 12.0 %    Eosinophil % 1.9 0.3 - 6.2 %    Basophil % 0.7 0.0 - 1.5 %    Immature Grans % 0.4 0.0 - 0.5 %    Neutrophils, Absolute 5.84 1.70 - 7.00 10*3/mm3    Lymphocytes, Absolute 2.87 0.70 - 3.10 10*3/mm3    Monocytes, Absolute 0.74 0.10 - 0.90 10*3/mm3    Eosinophils, Absolute 0.19 0.00 - 0.40 10*3/mm3    Basophils, Absolute 0.07 0.00 - 0.20 10*3/mm3    Immature Grans, Absolute 0.04 0.00 -  0.05 10*3/mm3    nRBC 0.0 0.0 - 0.2 /100 WBC   ECG 12 Lead Stroke Evaluation    Collection Time: 12/08/22  2:39 PM   Result Value Ref Range    QT Interval 382 ms       Ordered the above labs and reviewed the results.        RADIOLOGY  XR Chest 1 View   Final Result   1. No acute process.       This report was finalized on 12/8/2022 3:38 PM by Dr. Hayden Ventura M.D.          CT Angiogram Head w AI Analysis of LVO   Final Result   No convincing acute intracranial abnormality is seen with no   convincing acute completed infarct and no acute intracranial hemorrhage   identified. There is a band of low-density through the inferior right   cerebellar cortex that I favor is artifact over a subtle acute infarct.   If there remains any clinical suspicion of acute infarct I recommend an   MRI of the brain for more complete assessment. The results of the   noncontrast head CT were communicated to Dr. Allen from [facility]   by telephone 12/08/2022 at 2:51 PM and he requested a CT angiogram of   the head and neck and a CT perfusion study of the brain.       CT ANGIOGRAM OF THE HEAD AND NECK AND CT PERFUSION STUDY OF THE BRAIN   TECHNIQUE: Spiral CT images were obtained from the inferior aspect of   the cerebellum through the majority of the cerebral hemispheres for a 10   cm vertical slab of brain imaging during the arterial phase of contrast   and the images were reformatted and analyzed with rapid software   analysis for a CT perfusion study of the brain. Subsequently spiral CT   images were obtained from the top of the aortic arch up through the   great vessels of the head and neck during the arterial phase of contrast   and the images were reformatted and submitted in 1 mm thick axial,   sagittal and coronal CT sections with soft tissue algorithm and 3-D   reconstructions were performed to complete the CT angiogram of the head   and neck and finally spiral CT images were obtained from the base of the   skull to  the vertex delayed following intravenous contrast and these   images were reformatted and submitted in 3 mm thick axial CT sections   with brain algorithm and 2 mm thick sagittal and coronal reconstructions   were performed and submitted in brain algorithm.       There are no prior CTAs for comparison. This is correlated to a prior   MRI of the brain 11/02/2022.       FINDINGS:   CT PERFUSION STUDY: The CT perfusion study of the brain consists of a 10   cm vertical slab of brain imaging from the inferior cerebellum through   the majority of the cerebral hemispheres and excludes the superior 3 cm   of the cerebral hemispheres and the very inferior tip of the cerebellum   and medulla which are not assessed. I see no areas of reduced cerebral   blood flow to less than 30% of normal and thus no acute completed   infarct and no areas of delayed time to maximal enhancement of greater   than 6 seconds and thus no onva hypoperfused brain within the 10 cm   vertical slab of brain imaging.       CT ANGIOGRAM OF THE NECK: The nasopharynx, oropharynx, hypopharynx, the   true cords and the subglottic airway are normal in appearance. The   thyroid gland enhances homogeneously and is normal in appearance. The   lung apices are clear. The parotid, , parapharyngeal and   submandibular spaces are symmetric and are normal in appearance. Mild   lower cervical spondylosis is present with some mild disc space   narrowing and degenerative endplate changes at C5-6 and C6-7 and   posterior endplate spurs and uncovertebral joint spurs contribute to   mild canal and mild left foraminal narrowing at C5-6, mild canal and   moderate bilateral bony foraminal narrowing at C6-7. There is anatomic   origin of the great vessels off the aortic arch. The left subclavian   artery origin is normal in appearance and no stenosis is seen in the   left subclavian artery. The left vertebral artery origin is normal in   appearance. No stenosis is  seen in the left vertebral artery from its   origin to the vertebrobasilar junction. The left common carotid origin   is normal in appearance. No stenosis is seen in the left common carotid   artery and its bifurcation into the left internal and external carotid   arteries is normal in appearance and no stenosis is seen in the cervical   segment of the left internal carotid artery using the NASCET criteria.   The brachiocephalic artery origin is normal in appearance. No stenosis   is seen in the brachiocephalic artery and its bifurcation into the right   subclavian and common carotid arteries is within normal limits and no   stenosis is seen in the right subclavian artery. The right vertebral   artery origin is normal in appearance and no stenosis is seen in the   right vertebral artery from its origin to the vertebrobasilar junction.   The right common carotid origin is normal in appearance and no stenosis   is seen in the right common carotid artery and its bifurcation into the   right internal and external carotid arteries is normal in appearance and   no stenosis is seen in the cervical segment of the right internal   carotid artery using the NASCET criteria.        CT ANGIOGRAM OF THE HEAD: The intracranial segments of the distal   vertebral arteries are widely patent to the vertebrobasilar junction   without stenosis. The basilar artery and the basilar tip are normal in   appearance. There is an atretic P1 segment of the left posterior   cerebral artery with persistent fetal origin of the left posterior   cerebral artery off the backwall of the supracavernous left internal   carotid artery which is a normal anatomic variation. Both the right and   left posterior cerebral and superior cerebellar arteries are normal in   appearance. The upper cervical, petrous, cavernous and supracavernous   segments of the internal carotid arteries are within normal limits. The   A1 segments of the anterior cerebral arteries  and the anterior   communicating artery origin and the A2 branches of the anterior cerebral   arteries are within normal limits. The M1 segments of the middle   cerebral arteries and middle cerebral artery bifurcations are within   normal limits and the visualized M2 branches within the Sylvian fissure   are widely patent.       IMPRESSION:   1. Noncontrast head CT demonstrates no convincing acute intracranial   abnormality with no convincing acute completed infarct and no acute   intracranial hemorrhage identified. There is some low-density in the   inferior tip of the right cerebellum that I think is most probably an   artifact rather than an acute infarct. If there remains clinical   suspicion of an acute stroke I recommend an MRI of the brain for more   complete assessment. The results of the noncontrast head CT were   communicated to Dr. Allen from [facility] while the patient was   still on our scanner on 12/08/2022 at 2:51 PM and he requested a CT   angiogram of the head and neck and a CT perfusion study of the brain.    2. The CT perfusion study of the brain consists of a 10 cm vertical slab   of brain imaging extending from the inferior cerebellum through the   majority of the cerebral hemispheres and excludes the inferior tip of   the cerebellum and medulla as well as the superior 3 cm of the cerebral   hemisphere which were not scanned on the CT perfusion study and not   evaluated. I see no acute completed infarct and no nova hypoperfused   brain within the 10 cm vertical slab of brain imaging.   3. Mild lower cervical spondylosis is present with some disc space   narrowing and degenerative endplate changes. Posterior endplate spurring   and uncovertebral joint hypertrophy contribute to mild canal and mild   left bony foraminal narrowing at C5-6 and there is mild canal and   moderate bilateral bony foraminal narrowing at C6-7.   4. The remainder of the CT angiogram of the head and neck is normal;    specifically, no stenosis is seen in the great vessels of the neck. In   particular, no stenosis is seen in the vertebral arteries and no   stenosis is seen in the cervical segments of the internal carotid   arteries using the NASCET criteria and no intracranial vascular stenoses   or occlusions are identified. The final dictated result was communicated   to both Dr. Allen from [facility] and Dr. Ramirez from the ER on   12/08/2022 at 3:20 PM.       AI analysis of LVO was utilized.       Radiation dose reduction techniques were utilized, including automated   exposure control and exposure modulation based on body size.       This report was finalized on 12/8/2022 4:14 PM by Dr. Ke Bhatt M.D.          CT Angiogram Neck   Final Result   No convincing acute intracranial abnormality is seen with no   convincing acute completed infarct and no acute intracranial hemorrhage   identified. There is a band of low-density through the inferior right   cerebellar cortex that I favor is artifact over a subtle acute infarct.   If there remains any clinical suspicion of acute infarct I recommend an   MRI of the brain for more complete assessment. The results of the   noncontrast head CT were communicated to Dr. Allen from [facility]   by telephone 12/08/2022 at 2:51 PM and he requested a CT angiogram of   the head and neck and a CT perfusion study of the brain.       CT ANGIOGRAM OF THE HEAD AND NECK AND CT PERFUSION STUDY OF THE BRAIN   TECHNIQUE: Spiral CT images were obtained from the inferior aspect of   the cerebellum through the majority of the cerebral hemispheres for a 10   cm vertical slab of brain imaging during the arterial phase of contrast   and the images were reformatted and analyzed with rapid software   analysis for a CT perfusion study of the brain. Subsequently spiral CT   images were obtained from the top of the aortic arch up through the   great vessels of the head and neck during the arterial  phase of contrast   and the images were reformatted and submitted in 1 mm thick axial,   sagittal and coronal CT sections with soft tissue algorithm and 3-D   reconstructions were performed to complete the CT angiogram of the head   and neck and finally spiral CT images were obtained from the base of the   skull to the vertex delayed following intravenous contrast and these   images were reformatted and submitted in 3 mm thick axial CT sections   with brain algorithm and 2 mm thick sagittal and coronal reconstructions   were performed and submitted in brain algorithm.       There are no prior CTAs for comparison. This is correlated to a prior   MRI of the brain 11/02/2022.       FINDINGS:   CT PERFUSION STUDY: The CT perfusion study of the brain consists of a 10   cm vertical slab of brain imaging from the inferior cerebellum through   the majority of the cerebral hemispheres and excludes the superior 3 cm   of the cerebral hemispheres and the very inferior tip of the cerebellum   and medulla which are not assessed. I see no areas of reduced cerebral   blood flow to less than 30% of normal and thus no acute completed   infarct and no areas of delayed time to maximal enhancement of greater   than 6 seconds and thus no nova hypoperfused brain within the 10 cm   vertical slab of brain imaging.       CT ANGIOGRAM OF THE NECK: The nasopharynx, oropharynx, hypopharynx, the   true cords and the subglottic airway are normal in appearance. The   thyroid gland enhances homogeneously and is normal in appearance. The   lung apices are clear. The parotid, , parapharyngeal and   submandibular spaces are symmetric and are normal in appearance. Mild   lower cervical spondylosis is present with some mild disc space   narrowing and degenerative endplate changes at C5-6 and C6-7 and   posterior endplate spurs and uncovertebral joint spurs contribute to   mild canal and mild left foraminal narrowing at C5-6, mild canal and    moderate bilateral bony foraminal narrowing at C6-7. There is anatomic   origin of the great vessels off the aortic arch. The left subclavian   artery origin is normal in appearance and no stenosis is seen in the   left subclavian artery. The left vertebral artery origin is normal in   appearance. No stenosis is seen in the left vertebral artery from its   origin to the vertebrobasilar junction. The left common carotid origin   is normal in appearance. No stenosis is seen in the left common carotid   artery and its bifurcation into the left internal and external carotid   arteries is normal in appearance and no stenosis is seen in the cervical   segment of the left internal carotid artery using the NASCET criteria.   The brachiocephalic artery origin is normal in appearance. No stenosis   is seen in the brachiocephalic artery and its bifurcation into the right   subclavian and common carotid arteries is within normal limits and no   stenosis is seen in the right subclavian artery. The right vertebral   artery origin is normal in appearance and no stenosis is seen in the   right vertebral artery from its origin to the vertebrobasilar junction.   The right common carotid origin is normal in appearance and no stenosis   is seen in the right common carotid artery and its bifurcation into the   right internal and external carotid arteries is normal in appearance and   no stenosis is seen in the cervical segment of the right internal   carotid artery using the NASCET criteria.        CT ANGIOGRAM OF THE HEAD: The intracranial segments of the distal   vertebral arteries are widely patent to the vertebrobasilar junction   without stenosis. The basilar artery and the basilar tip are normal in   appearance. There is an atretic P1 segment of the left posterior   cerebral artery with persistent fetal origin of the left posterior   cerebral artery off the backwall of the supracavernous left internal   carotid artery which is a  normal anatomic variation. Both the right and   left posterior cerebral and superior cerebellar arteries are normal in   appearance. The upper cervical, petrous, cavernous and supracavernous   segments of the internal carotid arteries are within normal limits. The   A1 segments of the anterior cerebral arteries and the anterior   communicating artery origin and the A2 branches of the anterior cerebral   arteries are within normal limits. The M1 segments of the middle   cerebral arteries and middle cerebral artery bifurcations are within   normal limits and the visualized M2 branches within the Sylvian fissure   are widely patent.       IMPRESSION:   1. Noncontrast head CT demonstrates no convincing acute intracranial   abnormality with no convincing acute completed infarct and no acute   intracranial hemorrhage identified. There is some low-density in the   inferior tip of the right cerebellum that I think is most probably an   artifact rather than an acute infarct. If there remains clinical   suspicion of an acute stroke I recommend an MRI of the brain for more   complete assessment. The results of the noncontrast head CT were   communicated to Dr. Allen from [facility] while the patient was   still on our scanner on 12/08/2022 at 2:51 PM and he requested a CT   angiogram of the head and neck and a CT perfusion study of the brain.    2. The CT perfusion study of the brain consists of a 10 cm vertical slab   of brain imaging extending from the inferior cerebellum through the   majority of the cerebral hemispheres and excludes the inferior tip of   the cerebellum and medulla as well as the superior 3 cm of the cerebral   hemisphere which were not scanned on the CT perfusion study and not   evaluated. I see no acute completed infarct and no nova hypoperfused   brain within the 10 cm vertical slab of brain imaging.   3. Mild lower cervical spondylosis is present with some disc space   narrowing and degenerative  endplate changes. Posterior endplate spurring   and uncovertebral joint hypertrophy contribute to mild canal and mild   left bony foraminal narrowing at C5-6 and there is mild canal and   moderate bilateral bony foraminal narrowing at C6-7.   4. The remainder of the CT angiogram of the head and neck is normal;   specifically, no stenosis is seen in the great vessels of the neck. In   particular, no stenosis is seen in the vertebral arteries and no   stenosis is seen in the cervical segments of the internal carotid   arteries using the NASCET criteria and no intracranial vascular stenoses   or occlusions are identified. The final dictated result was communicated   to both Dr. Allen from Stroke Neurology and Dr. Ramirez from the ER on   12/08/2022 at 3:20 PM.       AI analysis of LVO was utilized.       Radiation dose reduction techniques were utilized, including automated   exposure control and exposure modulation based on body size.       This report was finalized on 12/8/2022 4:14 PM by Dr. Ke Bhatt M.D.          CT CEREBRAL PERFUSION WITH & WITHOUT CONTRAST   Final Result   No convincing acute intracranial abnormality is seen with no   convincing acute completed infarct and no acute intracranial hemorrhage   identified. There is a band of low-density through the inferior right   cerebellar cortex that I favor is artifact over a subtle acute infarct.   If there remains any clinical suspicion of acute infarct I recommend an   MRI of the brain for more complete assessment. The results of the   noncontrast head CT were communicated to Dr. Allen from [facility]   by telephone 12/08/2022 at 2:51 PM and he requested a CT angiogram of   the head and neck and a CT perfusion study of the brain.       CT ANGIOGRAM OF THE HEAD AND NECK AND CT PERFUSION STUDY OF THE BRAIN   TECHNIQUE: Spiral CT images were obtained from the inferior aspect of   the cerebellum through the majority of the cerebral hemispheres for a 10    cm vertical slab of brain imaging during the arterial phase of contrast   and the images were reformatted and analyzed with rapid software   analysis for a CT perfusion study of the brain. Subsequently spiral CT   images were obtained from the top of the aortic arch up through the   great vessels of the head and neck during the arterial phase of contrast   and the images were reformatted and submitted in 1 mm thick axial,   sagittal and coronal CT sections with soft tissue algorithm and 3-D   reconstructions were performed to complete the CT angiogram of the head   and neck and finally spiral CT images were obtained from the base of the   skull to the vertex delayed following intravenous contrast and these   images were reformatted and submitted in 3 mm thick axial CT sections   with brain algorithm and 2 mm thick sagittal and coronal reconstructions   were performed and submitted in brain algorithm.       There are no prior CTAs for comparison. This is correlated to a prior   MRI of the brain 11/02/2022.       FINDINGS:   CT PERFUSION STUDY: The CT perfusion study of the brain consists of a 10   cm vertical slab of brain imaging from the inferior cerebellum through   the majority of the cerebral hemispheres and excludes the superior 3 cm   of the cerebral hemispheres and the very inferior tip of the cerebellum   and medulla which are not assessed. I see no areas of reduced cerebral   blood flow to less than 30% of normal and thus no acute completed   infarct and no areas of delayed time to maximal enhancement of greater   than 6 seconds and thus no nova hypoperfused brain within the 10 cm   vertical slab of brain imaging.       CT ANGIOGRAM OF THE NECK: The nasopharynx, oropharynx, hypopharynx, the   true cords and the subglottic airway are normal in appearance. The   thyroid gland enhances homogeneously and is normal in appearance. The   lung apices are clear. The parotid, , parapharyngeal and    submandibular spaces are symmetric and are normal in appearance. Mild   lower cervical spondylosis is present with some mild disc space   narrowing and degenerative endplate changes at C5-6 and C6-7 and   posterior endplate spurs and uncovertebral joint spurs contribute to   mild canal and mild left foraminal narrowing at C5-6, mild canal and   moderate bilateral bony foraminal narrowing at C6-7. There is anatomic   origin of the great vessels off the aortic arch. The left subclavian   artery origin is normal in appearance and no stenosis is seen in the   left subclavian artery. The left vertebral artery origin is normal in   appearance. No stenosis is seen in the left vertebral artery from its   origin to the vertebrobasilar junction. The left common carotid origin   is normal in appearance. No stenosis is seen in the left common carotid   artery and its bifurcation into the left internal and external carotid   arteries is normal in appearance and no stenosis is seen in the cervical   segment of the left internal carotid artery using the NASCET criteria.   The brachiocephalic artery origin is normal in appearance. No stenosis   is seen in the brachiocephalic artery and its bifurcation into the right   subclavian and common carotid arteries is within normal limits and no   stenosis is seen in the right subclavian artery. The right vertebral   artery origin is normal in appearance and no stenosis is seen in the   right vertebral artery from its origin to the vertebrobasilar junction.   The right common carotid origin is normal in appearance and no stenosis   is seen in the right common carotid artery and its bifurcation into the   right internal and external carotid arteries is normal in appearance and   no stenosis is seen in the cervical segment of the right internal   carotid artery using the NASCET criteria.        CT ANGIOGRAM OF THE HEAD: The intracranial segments of the distal   vertebral arteries are widely  patent to the vertebrobasilar junction   without stenosis. The basilar artery and the basilar tip are normal in   appearance. There is an atretic P1 segment of the left posterior   cerebral artery with persistent fetal origin of the left posterior   cerebral artery off the backwall of the supracavernous left internal   carotid artery which is a normal anatomic variation. Both the right and   left posterior cerebral and superior cerebellar arteries are normal in   appearance. The upper cervical, petrous, cavernous and supracavernous   segments of the internal carotid arteries are within normal limits. The   A1 segments of the anterior cerebral arteries and the anterior   communicating artery origin and the A2 branches of the anterior cerebral   arteries are within normal limits. The M1 segments of the middle   cerebral arteries and middle cerebral artery bifurcations are within   normal limits and the visualized M2 branches within the Sylvian fissure   are widely patent.       IMPRESSION:   1. Noncontrast head CT demonstrates no convincing acute intracranial   abnormality with no convincing acute completed infarct and no acute   intracranial hemorrhage identified. There is some low-density in the   inferior tip of the right cerebellum that I think is most probably an   artifact rather than an acute infarct. If there remains clinical   suspicion of an acute stroke I recommend an MRI of the brain for more   complete assessment. The results of the noncontrast head CT were   communicated to Dr. Allen from [facility]roke Neurology while the patient was   still on our scanner on 12/08/2022 at 2:51 PM and he requested a CT   angiogram of the head and neck and a CT perfusion study of the brain.    2. The CT perfusion study of the brain consists of a 10 cm vertical slab   of brain imaging extending from the inferior cerebellum through the   majority of the cerebral hemispheres and excludes the inferior tip of   the cerebellum and  medulla as well as the superior 3 cm of the cerebral   hemisphere which were not scanned on the CT perfusion study and not   evaluated. I see no acute completed infarct and no nova hypoperfused   brain within the 10 cm vertical slab of brain imaging.   3. Mild lower cervical spondylosis is present with some disc space   narrowing and degenerative endplate changes. Posterior endplate spurring   and uncovertebral joint hypertrophy contribute to mild canal and mild   left bony foraminal narrowing at C5-6 and there is mild canal and   moderate bilateral bony foraminal narrowing at C6-7.   4. The remainder of the CT angiogram of the head and neck is normal;   specifically, no stenosis is seen in the great vessels of the neck. In   particular, no stenosis is seen in the vertebral arteries and no   stenosis is seen in the cervical segments of the internal carotid   arteries using the NASCET criteria and no intracranial vascular stenoses   or occlusions are identified. The final dictated result was communicated   to both Dr. Allen from Stroke Neurology and Dr. Ramirez from the ER on   12/08/2022 at 3:20 PM.       AI analysis of LVO was utilized.       Radiation dose reduction techniques were utilized, including automated   exposure control and exposure modulation based on body size.       This report was finalized on 12/8/2022 4:14 PM by Dr. Ke Bhatt M.D.          MRI Brain With & Without Contrast    (Results Pending)        Ordered the above noted radiological studies. Reviewed by me in PACS.  Discussed with Dr. Bhatt (radiologist) regarding CT/MRI scan results.          PROCEDURES  Procedures      EKG:          EKG time: 1439  Rhythm/Rate: Normal sinus rhythm 75  P waves and CA: Normal P waves  QRS, axis: Normal QRS  ST and T waves: Nonspecific ST-T wave    Interpreted Contemporaneously by me, independently viewed  No prior        MEDICATIONS GIVEN IN ER  Medications   sodium chloride 0.9 % flush 10 mL (has no  administration in time range)   diazePAM (VALIUM) tablet 5 mg (has no administration in time range)   iopamidol (ISOVUE-370) 76 % injection 150 mL (150 mL Intravenous Given 12/8/22 1510)   sodium chloride 0.9 % bolus 1,000 mL (1,000 mL Intravenous New Bag 12/8/22 1709)   ketorolac (TORADOL) injection 30 mg (30 mg Intravenous Given 12/8/22 1709)             PROGRESS AND CONSULTS  ED Course as of 12/08/22 1809   Thu Dec 08, 2022   1537 15:37 EST  Patient presents for vertigo.  Team D was called on arrival.  Patient was seen by stroke neurologist.  CT angiogram and CT head are negative.  Patient will be admitted to the observation unit.  Patient will be given treatment as well as MRI.  Discussed with Izabel Watson who will admit [SL]      ED Course User Index  [SL] Abdirahman Ramirez MD           MEDICAL DECISION MAKING      MDM  Number of Diagnoses or Management Options     Amount and/or Complexity of Data Reviewed  Clinical lab tests: reviewed and ordered (White blood cell count 9.7)  Tests in the radiology section of CPT®: reviewed and ordered (CT and CT angio negative)  Discuss the patient with other providers: yes (Seen here by Dr. Allen.  Discussed with Izabel JERONIMO with observation and admitted for further eval.)               DIAGNOSIS  Final diagnoses:   Vertigo           DISPOSITION  admit        Latest Documented Vital Signs:  As of 18:09 EST  BP- 134/86 HR- 72 Temp- 97.9 °F (36.6 °C) (Oral) O2 sat- 94%                         Abdirahman Ramirez MD  12/08/22 8559

## 2022-12-09 ENCOUNTER — READMISSION MANAGEMENT (OUTPATIENT)
Dept: CALL CENTER | Facility: HOSPITAL | Age: 62
End: 2022-12-09

## 2022-12-09 VITALS
OXYGEN SATURATION: 97 % | DIASTOLIC BLOOD PRESSURE: 78 MMHG | WEIGHT: 198.7 LBS | TEMPERATURE: 97.8 F | HEART RATE: 67 BPM | HEIGHT: 70 IN | RESPIRATION RATE: 16 BRPM | SYSTOLIC BLOOD PRESSURE: 129 MMHG | BODY MASS INDEX: 28.45 KG/M2

## 2022-12-09 LAB — CREAT BLDA-MCNC: 1.1 MG/DL (ref 0.6–1.3)

## 2022-12-09 PROCEDURE — 97530 THERAPEUTIC ACTIVITIES: CPT

## 2022-12-09 PROCEDURE — 97161 PT EVAL LOW COMPLEX 20 MIN: CPT

## 2022-12-09 PROCEDURE — G0378 HOSPITAL OBSERVATION PER HR: HCPCS

## 2022-12-09 RX ORDER — MECLIZINE HYDROCHLORIDE 25 MG/1
25 TABLET ORAL 3 TIMES DAILY PRN
Qty: 30 TABLET | Refills: 2 | Status: SHIPPED | OUTPATIENT
Start: 2022-12-09

## 2022-12-09 RX ORDER — MECLIZINE HYDROCHLORIDE 25 MG/1
25 TABLET ORAL 3 TIMES DAILY PRN
Status: DISCONTINUED | OUTPATIENT
Start: 2022-12-09 | End: 2022-12-09 | Stop reason: HOSPADM

## 2022-12-09 RX ADMIN — MEMANTINE HYDROCHLORIDE 10 MG: 10 TABLET, FILM COATED ORAL at 08:15

## 2022-12-09 RX ADMIN — DONEPEZIL HYDROCHLORIDE 10 MG: 10 TABLET, FILM COATED ORAL at 08:15

## 2022-12-09 RX ADMIN — ATORVASTATIN CALCIUM 10 MG: 10 TABLET, FILM COATED ORAL at 08:15

## 2022-12-09 RX ADMIN — MULTIPLE VITAMINS W/ MINERALS TAB 1 TABLET: TAB at 08:15

## 2022-12-09 RX ADMIN — ASPIRIN 81 MG: 81 TABLET, CHEWABLE ORAL at 08:15

## 2022-12-09 RX ADMIN — OXYCODONE HYDROCHLORIDE AND ACETAMINOPHEN 250 MG: 500 TABLET ORAL at 08:15

## 2022-12-09 NOTE — DISCHARGE INSTRUCTIONS
You have been prescribed meclizine 25 mg tablets take 3 times daily as needed for dizziness.  An outpatient referral for vestibular physical therapy has been placed, you should receive a call regarding this appointment.  Follow-up with your primary care doctor in 1 to 2 weeks.    Return to the emergency department with worsening symptoms, uncontrolled pain, inability to tolerate oral liquids, fever greater than 101°F not controlled by Tylenol or as needed with emergent concerns.

## 2022-12-09 NOTE — PLAN OF CARE
Goal Outcome Evaluation:   Plan of care reviewed with patient   Outcome summary: Dizziness improved throughout shift, vitals stable, Pt unable to answer month correctly, patient does have history of memory deficits, Neuro to see today along with PT for epley

## 2022-12-09 NOTE — PLAN OF CARE
Goal Outcome Evaluation:              Outcome Evaluation: Pt is 61 yo male presenting w dizziness.  Pt independent w mobility prior to admission.  At time of PT eval pt states all symptoms of spinning resolved.  Pt ambulated 120' independently w/o AD.  Pt was able to turn head from side to side and turn quickly when walking w/o experiencing symptoms.  Due to the above PT will sign off at this time.  PT educated pt to see out patient vestibular therapist if symptoms present once d/c.

## 2022-12-09 NOTE — PLAN OF CARE
Problem: Adult Inpatient Plan of Care  Goal: Plan of Care Review  Outcome: Met  Goal: Patient-Specific Goal (Individualized)  Outcome: Met  Goal: Absence of Hospital-Acquired Illness or Injury  Outcome: Met  Intervention: Identify and Manage Fall Risk  Recent Flowsheet Documentation  Taken 12/9/2022 1000 by Keith Pérez RN  Safety Promotion/Fall Prevention:   activity supervised   nonskid shoes/slippers when out of bed   safety round/check completed  Taken 12/9/2022 0817 by Keith Pérez RN  Safety Promotion/Fall Prevention:   activity supervised   nonskid shoes/slippers when out of bed   safety round/check completed  Intervention: Prevent Skin Injury  Recent Flowsheet Documentation  Taken 12/9/2022 1000 by Keith Pérez RN  Body Position: position changed independently  Taken 12/9/2022 0817 by Keith Pérez RN  Body Position: position changed independently  Intervention: Prevent and Manage VTE (Venous Thromboembolism) Risk  Recent Flowsheet Documentation  Taken 12/9/2022 1000 by Keith Pérez RN  Activity Management:   activity adjusted per tolerance   ambulated to bathroom   ambulated in room  Taken 12/9/2022 0817 by Keith Pérez RN  Activity Management: activity adjusted per tolerance  Intervention: Prevent Infection  Recent Flowsheet Documentation  Taken 12/9/2022 1000 by Keith Pérez RN  Infection Prevention:   single patient room provided   rest/sleep promoted  Taken 12/9/2022 0817 by Keith Pérez RN  Infection Prevention: single patient room provided  Goal: Optimal Comfort and Wellbeing  Outcome: Met  Intervention: Provide Person-Centered Care  Recent Flowsheet Documentation  Taken 12/9/2022 0813 by Keith Pérez RN  Trust Relationship/Rapport:   care explained   questions encouraged   reassurance provided   thoughts/feelings acknowledged   emotional support provided  Goal: Readiness for Transition of Care  Outcome: Met     Problem: Pain Acute  Goal: Acceptable Pain Control and Functional  Ability  Outcome: Met   Goal Outcome Evaluation:

## 2022-12-09 NOTE — THERAPY EVALUATION
Patient Name: Biju Verduzco  : 1960    MRN: [id number]                              Today's Date: 2022       Admit Date: 2022    Visit Dx:     ICD-10-CM ICD-9-CM   1. Vertigo  R42 780.4     Patient Active Problem List   Diagnosis   • ED (erectile dysfunction)   • Hypercholesteremia   • Attention deficit hyperactivity disorder (ADHD)   • Seasonal allergic rhinitis due to pollen   • Mild cognitive impairment with memory loss   • Biological false positive RPR test   • Irritability and anger   • Prediabetes   • Dizziness     Past Medical History:   Diagnosis Date   • ADD (attention deficit disorder)    • Anxiety    • Atypical chest pain 3/6/2019   • Borderline systolic HTN 12/10/2019   • Depression    • ED (erectile dysfunction)    • Epicondylitis elbow, medial, left 2019   • Erectile dysfunction    • Hypercholesteremia    • Skin lesion of scalp 3/23/2020    Dermatologic follow-up   • Sleep disorder      Past Surgical History:   Procedure Laterality Date   • CARDIOVASCULAR STRESS TEST  2013    stress Echo      General Information     Row Name 2244          Physical Therapy Time and Intention    Document Type evaluation  -MD     Mode of Treatment physical therapy  -MD     Row Name 2244          General Information    Patient Profile Reviewed yes  -MD     Prior Level of Function independent:  -MD     Existing Precautions/Restrictions no known precautions/restrictions  -MD     Barriers to Rehab none identified  -MD     Row Name 2244          Living Environment    People in Home spouse  -MD     Row Name 2244          Cognition    Orientation Status (Cognition) oriented to;person  -MD           User Key  (r) = Recorded By, (t) = Taken By, (c) = Cosigned By    Initials Name Provider Type    Kavya Obrien, PT Physical Therapist               Mobility     Row Name 22 0845          Bed Mobility    Bed Mobility supine-sit-supine  -MD     Supine-Sit-Supine  [place] (Bed Mobility) independent  -MD     Row Name 12/09/22 0845          Sit-Stand Transfer    Sit-Stand [place] (Transfers) independent  -MD     Row Name 12/09/22 0845          Gait/Stairs (Locomotion)    [place] Level (Gait) independent  -MD     Distance in Feet (Gait) 120'  -MD           User Key  (r) = Recorded By, (t) = Taken By, (c) = Cosigned By    Initials Name Provider Type    Kavya Obrien, PT Physical Therapist               Obj/Interventions     Row Name 12/09/22 0845          Range of Motion Comprehensive    General Range of Motion no range of motion deficits identified  -MD     Row Name 12/09/22 0845          Strength Comprehensive (MMT)    General Manual Muscle Testing (MMT) Assessment no strength deficits identified  -MD           User Key  (r) = Recorded By, (t) = Taken By, (c) = Cosigned By    Initials Name Provider Type    Kavya Obrien, PT Physical Therapist               Goals/Plan    No documentation.                Clinical Impression     Row Name 12/09/22 0845          Pain    Pretreatment Pain Rating 0/10 - no pain  -MD     Row Name 12/09/22 0845          Plan of Care Review    Outcome Evaluation Pt is 63 yo male presenting w dizziness.  Pt independent w mobility prior to admission.  At time of PT eval pt states all symptoms of spinning resolved.  Pt ambulated 120' independently w/o AD.  Pt was able to turn head from side to side and turn quickly when walking w/o experiencing symptoms.  Due to the above PT will sign off at this time.  PT educated pt to see out patient vestibular therapist if symptoms present once d/c.  -MD     Row Name 12/09/22 0845          Therapy Assessment/Plan (PT)    Criteria for Skilled Interventions Met (PT) no;does not meet criteria for skilled intervention  -MD     Therapy Frequency (PT) evaluation only  -MD     Row Name 12/09/22 0845          Positioning and Restraints    Pre-Treatment Position in bed  -MD     Post Treatment Position bed  -MD      In Bed sitting EOB;call light within reach;exit alarm on  -MD           User Key  (r) = Recorded By, (t) = Taken By, (c) = Cosigned By    Initials Name Provider Type    Kavya Obrien, PT Physical Therapist               Outcome Measures     Row Name 12/09/22 0849          How much help from another person do you currently need...    Turning from your back to your side while in flat bed without using bedrails? 4  -MD     Moving from lying on back to sitting on the side of a flat bed without bedrails? 4  -MD     Moving to and from a bed to a chair (including a wheelchair)? 4  -MD     Standing up from a chair using your arms (e.g., wheelchair, bedside chair)? 4  -MD     Climbing 3-5 steps with a railing? 4  -MD     To walk in hospital room? 4  -MD     AM-PAC 6 Clicks Score (PT) 24  -MD     Highest level of mobility 8 --> Walked 250 feet or more  -MD     Row Name 12/09/22 0849          Functional Assessment    Outcome Measure Options AM-PAC 6 Clicks Basic Mobility (PT)  -MD           User Key  (r) = Recorded By, (t) = Taken By, (c) = Cosigned By    Initials Name Provider Type    Kavya Obrien, PT Physical Therapist                               PT Recommendation and Plan     Outcome Evaluation: Pt is 63 yo male presenting w dizziness.  Pt independent w mobility prior to admission.  At time of PT eval pt states all symptoms of spinning resolved.  Pt ambulated 120' independently w/o AD.  Pt was able to turn head from side to side and turn quickly when walking w/o experiencing symptoms.  Due to the above PT will sign off at this time.  PT educated pt to see out patient vestibular therapist if symptoms present once d/c.     Time Calculation:    PT Charges     Row Name 12/09/22 0844             Time Calculation    Start Time 0830  -MD      Stop Time 0844  -MD      Time Calculation (min) 14 min  -MD      PT Received On 12/09/22  -MD            User Key  (r) = Recorded By, (t) = Taken By, (c) = Cosigned By    Initials Name  Provider Type    Kavya Obrien, PT Physical Therapist              Therapy Charges for Today     Code Description Service Date Service Provider Modifiers Qty    [id number]  PT EVAL LOW COMPLEXITY 1 12/9/2022 Kavya Gomez, PT GP 1    [id number]  PT THERAPEUTIC ACT EA 15 MIN 12/9/2022 Kavya Gomez, PT GP 1        Patient was not wearing a face mask during this therapy encounter. Therapist used appropriate personal protective equipment including mask and gloves.  Mask used was standard procedure mask. Appropriate PPE was worn during the entire therapy session. Hand hygiene was completed before and after therapy session. Patient is not in enhanced droplet precautions.       PT G-Codes  Outcome Measure Options: AM-PAC 6 Clicks Basic Mobility (PT)  AM-PAC 6 Clicks Score (PT): 24  PT Discharge Summary  Anticipated Discharge Disposition (PT): home with assist    Kavya Gomez PT  12/9/2022

## 2022-12-09 NOTE — PROGRESS NOTES
.  ED OBSERVATION PROGRESS/DISCHARGE SUMMARY    Date of Admission: 12/8/2022   LOS: 0 days   PCP: Darren Pérez MD    Subjective   Patient denies any further dizziness.  He states he was able to get around without any difficulty with PT this morning.  He denies any headache or visual changes.  Denies any numbness and tingling or weakness.  Denies any chest pain or shortness of breath.  Denies fevers and chills.  Denies abdominal pain and nausea.    Hospital Outcome:   62-year-old male seen and examined at bedside following admission to the observation unit for vertigo.  CT head and CTA head and neck negative for acute infarct or stenosis.  Dr. Allen with neurology evaluated patient and he has recommended MRI brain without which shows no acute intracranial findings.  PT consulted, patient remained asymptomatic and was able to ambulate without any difficulties.  We will discharge patient home with meclizine 25 mg as needed and an outpatient referral to vestibular therapy for recurrent symptoms.  We will have patient follow-up with primary care doctor in 1 to 2 weeks.  I discussed all of the findings and plan for discharge with the patient who endorses understanding is in agreement.    ROS:  General: no fevers, chills  Respiratory: no cough, dyspnea  Cardiovascular: no chest pain, palpitations  Abdomen: No abdominal pain, nausea, vomiting, or diarrhea  Neurologic: No focal weakness    Objective   Physical Exam:  I have reviewed the vital signs.  Temp:  [95.7 °F (35.4 °C)-97.9 °F (36.6 °C)] 97.5 °F (36.4 °C)  Heart Rate:  [58-81] 73  Resp:  [18] 18  BP: (125-171)/() 132/65  General Appearance:  62-year-old male, well-nourished, no acute distress on room air  Head:    Normocephalic, atraumatic  Eyes:    Sclerae anicteric  Neck:   Supple, no mass  Lungs: Clear to auscultation bilaterally, respirations unlabored  Heart: Regular rate and rhythm, S1 and S2 normal, no murmur, rub or gallop  Abdomen:  Soft, non-tender,  bowel sounds active, nondistended  Extremities: No clubbing, cyanosis, or edema to lower extremities  Pulses:  2+ and symmetric in distal lower extremities  Skin: No rashes   Neurologic: Oriented x3, Normal strength to extremities    Results Review:    I have reviewed the labs, radiology results and diagnostic studies.    Results from last 7 days   Lab Units 12/08/22  1438   WBC 10*3/mm3 9.75   HEMOGLOBIN g/dL 14.4   HEMATOCRIT % 44.0   PLATELETS 10*3/mm3 345     Results from last 7 days   Lab Units 12/08/22  1438   SODIUM mmol/L 138   POTASSIUM mmol/L 4.4   CHLORIDE mmol/L 100   CO2 mmol/L 25.9   BUN mg/dL 21   CREATININE mg/dL 1.05   CALCIUM mg/dL 9.8   BILIRUBIN mg/dL 0.4   ALK PHOS U/L 94   ALT (SGPT) U/L 35   AST (SGOT) U/L 29   GLUCOSE mg/dL 109*     Imaging Results (Last 24 Hours)     Procedure Component Value Units Date/Time    MRI Brain With & Without Contrast [[id number]] Collected: 12/08/22 2036     Updated: 12/08/22 2055    Narrative:      BRAIN MRI WITH AND WITHOUT CONTRAST     HISTORY: Dizziness for a day. Evaluate for stroke.     TECHNIQUE: Pre and postgadolinium MRI of the brain is provided and  correlated with negative CT angiogram head and neck and perfusion exam  performed earlier today.     FINDINGS: The ventricles are normal in caliber. There is no restricted  diffusion. Brain parenchyma maintains normal signal. The optic chiasm,  pituitary, and visualized upper cervical spine appear normal. Expected  intracranial flow voids are observed. There is no cerebellopontine angle  lesion. There is no abnormal contrast enhancement. Marrow signal in the  skull is normal. Paranasal sinuses and orbital structures appear normal.  Mastoid air cells are clear. Upper facial soft tissues appear normal.       Impression:      Negative.     This report was finalized on 12/8/2022 8:52 PM by Dr. Tj Gilbert M.D.       CT Angiogram Head w AI Analysis of LVO [[id number]] Collected: 12/08/22 1609     Updated:  12/08/22 1617    Narrative:      EMERGENCY CONTRAST-ENHANCED CT ANGIOGRAM OF THE HEAD AND NECK AND CT  PERFUSION STUDY OF THE BRAIN 12/08/2022     CLINICAL HISTORY: Patient had acute onset of left-sided facial droop,  headaches, dizziness, evaluate for acute stroke. Team D.     NONCONTRAST HEAD CT TECHNIQUE: Spiral CT images were obtained from the  base of the skull to the vertex without intravenous contrast. The images  were reformatted and are submitted in 3 mm thick axial CT sections with  brain algorithm and 2 mm thick sagittal and coronal reconstructions were  performed and submitted in brain algorithm.     There are no prior head CTs for comparison.     FINDINGS: There is a band of low-density through the inferior tip of the  right cerebellum on axial CT images 5 through 8. I favor it is streak  artifact rather than a subtle acute infarct. The remainder of the brain  parenchyma is normal in attenuation. The ventricles are normal in size.  I see no mass effect, no midline shift. No extra-axial fluid collections  are identified and there is no evidence of acute intracranial  hemorrhage. The calvarium and skull base are normal in appearance. The  paranasal sinuses and the mastoid air cells and the middle ear cavities  are clear.       Impression:      No convincing acute intracranial abnormality is seen with no  convincing acute completed infarct and no acute intracranial hemorrhage  identified. There is a band of low-density through the inferior right  cerebellar cortex that I favor is artifact over a subtle acute infarct.  If there remains any clinical suspicion of acute infarct I recommend an  MRI of the brain for more complete assessment. The results of the  noncontrast head CT were communicated to Dr. Allen from Stroke Neurology  by telephone 12/08/2022 at 2:51 PM and he requested a CT angiogram of  the head and neck and a CT perfusion study of the brain.     CT ANGIOGRAM OF THE HEAD AND NECK AND CT PERFUSION  STUDY OF THE BRAIN  TECHNIQUE: Spiral CT images were obtained from the inferior aspect of  the cerebellum through the majority of the cerebral hemispheres for a 10  cm vertical slab of brain imaging during the arterial phase of contrast  and the images were reformatted and analyzed with rapid software  analysis for a CT perfusion study of the brain. Subsequently spiral CT  images were obtained from the top of the aortic arch up through the  great vessels of the head and neck during the arterial phase of contrast  and the images were reformatted and submitted in 1 mm thick axial,  sagittal and coronal CT sections with soft tissue algorithm and 3-D  reconstructions were performed to complete the CT angiogram of the head  and neck and finally spiral CT images were obtained from the base of the  skull to the vertex delayed following intravenous contrast and these  images were reformatted and submitted in 3 mm thick axial CT sections  with brain algorithm and 2 mm thick sagittal and coronal reconstructions  were performed and submitted in brain algorithm.     There are no prior CTAs for comparison. This is correlated to a prior  MRI of the brain 11/02/2022.     FINDINGS:  CT PERFUSION STUDY: The CT perfusion study of the brain consists of a 10  cm vertical slab of brain imaging from the inferior cerebellum through  the majority of the cerebral hemispheres and excludes the superior 3 cm  of the cerebral hemispheres and the very inferior tip of the cerebellum  and medulla which are not assessed. I see no areas of reduced cerebral  blood flow to less than 30% of normal and thus no acute completed  infarct and no areas of delayed time to maximal enhancement of greater  than 6 seconds and thus no nova hypoperfused brain within the 10 cm  vertical slab of brain imaging.     CT ANGIOGRAM OF THE NECK: The nasopharynx, oropharynx, hypopharynx, the  true cords and the subglottic airway are normal in appearance. The  thyroid gland  enhances homogeneously and is normal in appearance. The  lung apices are clear. The parotid, , parapharyngeal and  submandibular spaces are symmetric and are normal in appearance. Mild  lower cervical spondylosis is present with some mild disc space  narrowing and degenerative endplate changes at C5-6 and C6-7 and  posterior endplate spurs and uncovertebral joint spurs contribute to  mild canal and mild left foraminal narrowing at C5-6, mild canal and  moderate bilateral bony foraminal narrowing at C6-7. There is anatomic  origin of the great vessels off the aortic arch. The left subclavian  artery origin is normal in appearance and no stenosis is seen in the  left subclavian artery. The left vertebral artery origin is normal in  appearance. No stenosis is seen in the left vertebral artery from its  origin to the vertebrobasilar junction. The left common carotid origin  is normal in appearance. No stenosis is seen in the left common carotid  artery and its bifurcation into the left internal and external carotid  arteries is normal in appearance and no stenosis is seen in the cervical  segment of the left internal carotid artery using the NASCET criteria.  The brachiocephalic artery origin is normal in appearance. No stenosis  is seen in the brachiocephalic artery and its bifurcation into the right  subclavian and common carotid arteries is within normal limits and no  stenosis is seen in the right subclavian artery. The right vertebral  artery origin is normal in appearance and no stenosis is seen in the  right vertebral artery from its origin to the vertebrobasilar junction.  The right common carotid origin is normal in appearance and no stenosis  is seen in the right common carotid artery and its bifurcation into the  right internal and external carotid arteries is normal in appearance and  no stenosis is seen in the cervical segment of the right internal  carotid artery using the NASCET criteria.       CT ANGIOGRAM OF THE HEAD: The intracranial segments of the distal  vertebral arteries are widely patent to the vertebrobasilar junction  without stenosis. The basilar artery and the basilar tip are normal in  appearance. There is an atretic P1 segment of the left posterior  cerebral artery with persistent fetal origin of the left posterior  cerebral artery off the backwall of the supracavernous left internal  carotid artery which is a normal anatomic variation. Both the right and  left posterior cerebral and superior cerebellar arteries are normal in  appearance. The upper cervical, petrous, cavernous and supracavernous  segments of the internal carotid arteries are within normal limits. The  A1 segments of the anterior cerebral arteries and the anterior  communicating artery origin and the A2 branches of the anterior cerebral  arteries are within normal limits. The M1 segments of the middle  cerebral arteries and middle cerebral artery bifurcations are within  normal limits and the visualized M2 branches within the Sylvian fissure  are widely patent.     IMPRESSION:  1. Noncontrast head CT demonstrates no convincing acute intracranial  abnormality with no convincing acute completed infarct and no acute  intracranial hemorrhage identified. There is some low-density in the  inferior tip of the right cerebellum that I think is most probably an  artifact rather than an acute infarct. If there remains clinical  suspicion of an acute stroke I recommend an MRI of the brain for more  complete assessment. The results of the noncontrast head CT were  communicated to Dr. Allen from Stroke Neurology while the patient was  still on our scanner on 12/08/2022 at 2:51 PM and he requested a CT  angiogram of the head and neck and a CT perfusion study of the brain.   2. The CT perfusion study of the brain consists of a 10 cm vertical slab  of brain imaging extending from the inferior cerebellum through the  majority of the cerebral  hemispheres and excludes the inferior tip of  the cerebellum and medulla as well as the superior 3 cm of the cerebral  hemisphere which were not scanned on the CT perfusion study and not  evaluated. I see no acute completed infarct and no nova hypoperfused  brain within the 10 cm vertical slab of brain imaging.  3. Mild lower cervical spondylosis is present with some disc space  narrowing and degenerative endplate changes. Posterior endplate spurring  and uncovertebral joint hypertrophy contribute to mild canal and mild  left bony foraminal narrowing at C5-6 and there is mild canal and  moderate bilateral bony foraminal narrowing at C6-7.  4. The remainder of the CT angiogram of the head and neck is normal;  specifically, no stenosis is seen in the great vessels of the neck. In  particular, no stenosis is seen in the vertebral arteries and no  stenosis is seen in the cervical segments of the internal carotid  arteries using the NASCET criteria and no intracranial vascular stenoses  or occlusions are identified. The final dictated result was communicated  to both Dr. Allen from Stroke Neurology and Dr. Ramirez from the ER on  12/08/2022 at 3:20 PM.     AI analysis of LVO was utilized.     Radiation dose reduction techniques were utilized, including automated  exposure control and exposure modulation based on body size.     This report was finalized on 12/8/2022 4:14 PM by Dr. Ke Bhatt M.D.       CT Angiogram Neck [[id number]] Collected: 12/08/22 1609     Updated: 12/08/22 1617    Narrative:      EMERGENCY CONTRAST-ENHANCED CT ANGIOGRAM OF THE HEAD AND NECK AND CT  PERFUSION STUDY OF THE BRAIN 12/08/2022     CLINICAL HISTORY: Patient had acute onset of left-sided facial droop,  headaches, dizziness, evaluate for acute stroke. Team D.     NONCONTRAST HEAD CT TECHNIQUE: Spiral CT images were obtained from the  base of the skull to the vertex without intravenous contrast. The images  were reformatted and are  submitted in 3 mm thick axial CT sections with  brain algorithm and 2 mm thick sagittal and coronal reconstructions were  performed and submitted in brain algorithm.     There are no prior head CTs for comparison.     FINDINGS: There is a band of low-density through the inferior tip of the  right cerebellum on axial CT images 5 through 8. I favor it is streak  artifact rather than a subtle acute infarct. The remainder of the brain  parenchyma is normal in attenuation. The ventricles are normal in size.  I see no mass effect, no midline shift. No extra-axial fluid collections  are identified and there is no evidence of acute intracranial  hemorrhage. The calvarium and skull base are normal in appearance. The  paranasal sinuses and the mastoid air cells and the middle ear cavities  are clear.       Impression:      No convincing acute intracranial abnormality is seen with no  convincing acute completed infarct and no acute intracranial hemorrhage  identified. There is a band of low-density through the inferior right  cerebellar cortex that I favor is artifact over a subtle acute infarct.  If there remains any clinical suspicion of acute infarct I recommend an  MRI of the brain for more complete assessment. The results of the  noncontrast head CT were communicated to Dr. Allen from [facility]  by telephone 12/08/2022 at 2:51 PM and he requested a CT angiogram of  the head and neck and a CT perfusion study of the brain.     CT ANGIOGRAM OF THE HEAD AND NECK AND CT PERFUSION STUDY OF THE BRAIN  TECHNIQUE: Spiral CT images were obtained from the inferior aspect of  the cerebellum through the majority of the cerebral hemispheres for a 10  cm vertical slab of brain imaging during the arterial phase of contrast  and the images were reformatted and analyzed with rapid software  analysis for a CT perfusion study of the brain. Subsequently spiral CT  images were obtained from the top of the aortic arch up through  the  great vessels of the head and neck during the arterial phase of contrast  and the images were reformatted and submitted in 1 mm thick axial,  sagittal and coronal CT sections with soft tissue algorithm and 3-D  reconstructions were performed to complete the CT angiogram of the head  and neck and finally spiral CT images were obtained from the base of the  skull to the vertex delayed following intravenous contrast and these  images were reformatted and submitted in 3 mm thick axial CT sections  with brain algorithm and 2 mm thick sagittal and coronal reconstructions  were performed and submitted in brain algorithm.     There are no prior CTAs for comparison. This is correlated to a prior  MRI of the brain 11/02/2022.     FINDINGS:  CT PERFUSION STUDY: The CT perfusion study of the brain consists of a 10  cm vertical slab of brain imaging from the inferior cerebellum through  the majority of the cerebral hemispheres and excludes the superior 3 cm  of the cerebral hemispheres and the very inferior tip of the cerebellum  and medulla which are not assessed. I see no areas of reduced cerebral  blood flow to less than 30% of normal and thus no acute completed  infarct and no areas of delayed time to maximal enhancement of greater  than 6 seconds and thus no nova hypoperfused brain within the 10 cm  vertical slab of brain imaging.     CT ANGIOGRAM OF THE NECK: The nasopharynx, oropharynx, hypopharynx, the  true cords and the subglottic airway are normal in appearance. The  thyroid gland enhances homogeneously and is normal in appearance. The  lung apices are clear. The parotid, , parapharyngeal and  submandibular spaces are symmetric and are normal in appearance. Mild  lower cervical spondylosis is present with some mild disc space  narrowing and degenerative endplate changes at C5-6 and C6-7 and  posterior endplate spurs and uncovertebral joint spurs contribute to  mild canal and mild left foraminal  narrowing at C5-6, mild canal and  moderate bilateral bony foraminal narrowing at C6-7. There is anatomic  origin of the great vessels off the aortic arch. The left subclavian  artery origin is normal in appearance and no stenosis is seen in the  left subclavian artery. The left vertebral artery origin is normal in  appearance. No stenosis is seen in the left vertebral artery from its  origin to the vertebrobasilar junction. The left common carotid origin  is normal in appearance. No stenosis is seen in the left common carotid  artery and its bifurcation into the left internal and external carotid  arteries is normal in appearance and no stenosis is seen in the cervical  segment of the left internal carotid artery using the NASCET criteria.  The brachiocephalic artery origin is normal in appearance. No stenosis  is seen in the brachiocephalic artery and its bifurcation into the right  subclavian and common carotid arteries is within normal limits and no  stenosis is seen in the right subclavian artery. The right vertebral  artery origin is normal in appearance and no stenosis is seen in the  right vertebral artery from its origin to the vertebrobasilar junction.  The right common carotid origin is normal in appearance and no stenosis  is seen in the right common carotid artery and its bifurcation into the  right internal and external carotid arteries is normal in appearance and  no stenosis is seen in the cervical segment of the right internal  carotid artery using the NASCET criteria.      CT ANGIOGRAM OF THE HEAD: The intracranial segments of the distal  vertebral arteries are widely patent to the vertebrobasilar junction  without stenosis. The basilar artery and the basilar tip are normal in  appearance. There is an atretic P1 segment of the left posterior  cerebral artery with persistent fetal origin of the left posterior  cerebral artery off the backwall of the supracavernous left internal  carotid artery which  is a normal anatomic variation. Both the right and  left posterior cerebral and superior cerebellar arteries are normal in  appearance. The upper cervical, petrous, cavernous and supracavernous  segments of the internal carotid arteries are within normal limits. The  A1 segments of the anterior cerebral arteries and the anterior  communicating artery origin and the A2 branches of the anterior cerebral  arteries are within normal limits. The M1 segments of the middle  cerebral arteries and middle cerebral artery bifurcations are within  normal limits and the visualized M2 branches within the Sylvian fissure  are widely patent.     IMPRESSION:  1. Noncontrast head CT demonstrates no convincing acute intracranial  abnormality with no convincing acute completed infarct and no acute  intracranial hemorrhage identified. There is some low-density in the  inferior tip of the right cerebellum that I think is most probably an  artifact rather than an acute infarct. If there remains clinical  suspicion of an acute stroke I recommend an MRI of the brain for more  complete assessment. The results of the noncontrast head CT were  communicated to Dr. Allen from [facility] while the patient was  still on our scanner on 12/08/2022 at 2:51 PM and he requested a CT  angiogram of the head and neck and a CT perfusion study of the brain.   2. The CT perfusion study of the brain consists of a 10 cm vertical slab  of brain imaging extending from the inferior cerebellum through the  majority of the cerebral hemispheres and excludes the inferior tip of  the cerebellum and medulla as well as the superior 3 cm of the cerebral  hemisphere which were not scanned on the CT perfusion study and not  evaluated. I see no acute completed infarct and no nova hypoperfused  brain within the 10 cm vertical slab of brain imaging.  3. Mild lower cervical spondylosis is present with some disc space  narrowing and degenerative endplate changes.  Posterior endplate spurring  and uncovertebral joint hypertrophy contribute to mild canal and mild  left bony foraminal narrowing at C5-6 and there is mild canal and  moderate bilateral bony foraminal narrowing at C6-7.  4. The remainder of the CT angiogram of the head and neck is normal;  specifically, no stenosis is seen in the great vessels of the neck. In  particular, no stenosis is seen in the vertebral arteries and no  stenosis is seen in the cervical segments of the internal carotid  arteries using the NASCET criteria and no intracranial vascular stenoses  or occlusions are identified. The final dictated result was communicated  to both Dr. Allen from Stroke Neurology and Dr. Ramirez from the ER on  12/08/2022 at 3:20 PM.     AI analysis of LVO was utilized.     Radiation dose reduction techniques were utilized, including automated  exposure control and exposure modulation based on body size.     This report was finalized on 12/8/2022 4:14 PM by Dr. Ke Bhatt M.D.       CT CEREBRAL PERFUSION WITH & WITHOUT CONTRAST [[id number]] Collected: 12/08/22 1609     Updated: 12/08/22 1617    Narrative:      EMERGENCY CONTRAST-ENHANCED CT ANGIOGRAM OF THE HEAD AND NECK AND CT  PERFUSION STUDY OF THE BRAIN 12/08/2022     CLINICAL HISTORY: Patient had acute onset of left-sided facial droop,  headaches, dizziness, evaluate for acute stroke. Team D.     NONCONTRAST HEAD CT TECHNIQUE: Spiral CT images were obtained from the  base of the skull to the vertex without intravenous contrast. The images  were reformatted and are submitted in 3 mm thick axial CT sections with  brain algorithm and 2 mm thick sagittal and coronal reconstructions were  performed and submitted in brain algorithm.     There are no prior head CTs for comparison.     FINDINGS: There is a band of low-density through the inferior tip of the  right cerebellum on axial CT images 5 through 8. I favor it is streak  artifact rather than a subtle acute infarct.  The remainder of the brain  parenchyma is normal in attenuation. The ventricles are normal in size.  I see no mass effect, no midline shift. No extra-axial fluid collections  are identified and there is no evidence of acute intracranial  hemorrhage. The calvarium and skull base are normal in appearance. The  paranasal sinuses and the mastoid air cells and the middle ear cavities  are clear.       Impression:      No convincing acute intracranial abnormality is seen with no  convincing acute completed infarct and no acute intracranial hemorrhage  identified. There is a band of low-density through the inferior right  cerebellar cortex that I favor is artifact over a subtle acute infarct.  If there remains any clinical suspicion of acute infarct I recommend an  MRI of the brain for more complete assessment. The results of the  noncontrast head CT were communicated to Dr. Allen from [facility]  by telephone 12/08/2022 at 2:51 PM and he requested a CT angiogram of  the head and neck and a CT perfusion study of the brain.     CT ANGIOGRAM OF THE HEAD AND NECK AND CT PERFUSION STUDY OF THE BRAIN  TECHNIQUE: Spiral CT images were obtained from the inferior aspect of  the cerebellum through the majority of the cerebral hemispheres for a 10  cm vertical slab of brain imaging during the arterial phase of contrast  and the images were reformatted and analyzed with rapid software  analysis for a CT perfusion study of the brain. Subsequently spiral CT  images were obtained from the top of the aortic arch up through the  great vessels of the head and neck during the arterial phase of contrast  and the images were reformatted and submitted in 1 mm thick axial,  sagittal and coronal CT sections with soft tissue algorithm and 3-D  reconstructions were performed to complete the CT angiogram of the head  and neck and finally spiral CT images were obtained from the base of the  skull to the vertex delayed following intravenous  contrast and these  images were reformatted and submitted in 3 mm thick axial CT sections  with brain algorithm and 2 mm thick sagittal and coronal reconstructions  were performed and submitted in brain algorithm.     There are no prior CTAs for comparison. This is correlated to a prior  MRI of the brain 11/02/2022.     FINDINGS:  CT PERFUSION STUDY: The CT perfusion study of the brain consists of a 10  cm vertical slab of brain imaging from the inferior cerebellum through  the majority of the cerebral hemispheres and excludes the superior 3 cm  of the cerebral hemispheres and the very inferior tip of the cerebellum  and medulla which are not assessed. I see no areas of reduced cerebral  blood flow to less than 30% of normal and thus no acute completed  infarct and no areas of delayed time to maximal enhancement of greater  than 6 seconds and thus no nova hypoperfused brain within the 10 cm  vertical slab of brain imaging.     CT ANGIOGRAM OF THE NECK: The nasopharynx, oropharynx, hypopharynx, the  true cords and the subglottic airway are normal in appearance. The  thyroid gland enhances homogeneously and is normal in appearance. The  lung apices are clear. The parotid, , parapharyngeal and  submandibular spaces are symmetric and are normal in appearance. Mild  lower cervical spondylosis is present with some mild disc space  narrowing and degenerative endplate changes at C5-6 and C6-7 and  posterior endplate spurs and uncovertebral joint spurs contribute to  mild canal and mild left foraminal narrowing at C5-6, mild canal and  moderate bilateral bony foraminal narrowing at C6-7. There is anatomic  origin of the great vessels off the aortic arch. The left subclavian  artery origin is normal in appearance and no stenosis is seen in the  left subclavian artery. The left vertebral artery origin is normal in  appearance. No stenosis is seen in the left vertebral artery from its  origin to the vertebrobasilar  junction. The left common carotid origin  is normal in appearance. No stenosis is seen in the left common carotid  artery and its bifurcation into the left internal and external carotid  arteries is normal in appearance and no stenosis is seen in the cervical  segment of the left internal carotid artery using the NASCET criteria.  The brachiocephalic artery origin is normal in appearance. No stenosis  is seen in the brachiocephalic artery and its bifurcation into the right  subclavian and common carotid arteries is within normal limits and no  stenosis is seen in the right subclavian artery. The right vertebral  artery origin is normal in appearance and no stenosis is seen in the  right vertebral artery from its origin to the vertebrobasilar junction.  The right common carotid origin is normal in appearance and no stenosis  is seen in the right common carotid artery and its bifurcation into the  right internal and external carotid arteries is normal in appearance and  no stenosis is seen in the cervical segment of the right internal  carotid artery using the NASCET criteria.      CT ANGIOGRAM OF THE HEAD: The intracranial segments of the distal  vertebral arteries are widely patent to the vertebrobasilar junction  without stenosis. The basilar artery and the basilar tip are normal in  appearance. There is an atretic P1 segment of the left posterior  cerebral artery with persistent fetal origin of the left posterior  cerebral artery off the backwall of the supracavernous left internal  carotid artery which is a normal anatomic variation. Both the right and  left posterior cerebral and superior cerebellar arteries are normal in  appearance. The upper cervical, petrous, cavernous and supracavernous  segments of the internal carotid arteries are within normal limits. The  A1 segments of the anterior cerebral arteries and the anterior  communicating artery origin and the A2 branches of the anterior cerebral  arteries are  within normal limits. The M1 segments of the middle  cerebral arteries and middle cerebral artery bifurcations are within  normal limits and the visualized M2 branches within the Sylvian fissure  are widely patent.     IMPRESSION:  1. Noncontrast head CT demonstrates no convincing acute intracranial  abnormality with no convincing acute completed infarct and no acute  intracranial hemorrhage identified. There is some low-density in the  inferior tip of the right cerebellum that I think is most probably an  artifact rather than an acute infarct. If there remains clinical  suspicion of an acute stroke I recommend an MRI of the brain for more  complete assessment. The results of the noncontrast head CT were  communicated to Dr. Allen from Stroke Neurology while the patient was  still on our scanner on 12/08/2022 at 2:51 PM and he requested a CT  angiogram of the head and neck and a CT perfusion study of the brain.   2. The CT perfusion study of the brain consists of a 10 cm vertical slab  of brain imaging extending from the inferior cerebellum through the  majority of the cerebral hemispheres and excludes the inferior tip of  the cerebellum and medulla as well as the superior 3 cm of the cerebral  hemisphere which were not scanned on the CT perfusion study and not  evaluated. I see no acute completed infarct and no nova hypoperfused  brain within the 10 cm vertical slab of brain imaging.  3. Mild lower cervical spondylosis is present with some disc space  narrowing and degenerative endplate changes. Posterior endplate spurring  and uncovertebral joint hypertrophy contribute to mild canal and mild  left bony foraminal narrowing at C5-6 and there is mild canal and  moderate bilateral bony foraminal narrowing at C6-7.  4. The remainder of the CT angiogram of the head and neck is normal;  specifically, no stenosis is seen in the great vessels of the neck. In  particular, no stenosis is seen in the vertebral arteries and  no  stenosis is seen in the cervical segments of the internal carotid  arteries using the NASCET criteria and no intracranial vascular stenoses  or occlusions are identified. The final dictated result was communicated  to both Dr. Allen from Stroke Neurology and Dr. Ramirez from the ER on  12/08/2022 at 3:20 PM.     AI analysis of LVO was utilized.     Radiation dose reduction techniques were utilized, including automated  exposure control and exposure modulation based on body size.     This report was finalized on 12/8/2022 4:14 PM by Dr. Ke Bhatt M.D.       XR Chest 1 View [[id number]] Collected: 12/08/22 1537     Updated: 12/08/22 1541    Narrative:      XR CHEST 1 VW-  12/08/2022     HISTORY: Stroke protocol.     Heart size is within normal limits. Lungs appear free of acute  infiltrates. Bones and soft tissues are unremarkable.       Impression:      1. No acute process.     This report was finalized on 12/8/2022 3:38 PM by Dr. Hayden Ventura M.D.             I have reviewed the medications.  ---------------------------------------------------------------------------------------------  Assessment & Plan   Assessment/Problem List    Dizziness      Plan:    Vertigo  -Resolved  -CTA head neck negative for acute intracranial findings  -MRI brain negative for acute infarct  -Neurology consulted suspect peripheral vertigo which is benign positional  -Discharged home with meclizine 25 mg 3 times daily as needed for dizziness  -PT saw and evaluated the patient, he remained asymptomatic without any difficulty ambulating  Outpatient referral for vestibular therapy placed-    Hyperlipidemia   -Continue Lipitor    Short-term memory loss  -Continue Namenda and Aricept    Disposition: Home    Follow-up after Discharge: PCP    This note will serve as discharge summary    I wore an face mask, eye protection, and gloves during this patient encounter. Patient also wearing a surgical mask. Hand hygeine performed before  and after seeing the patient.    Koki Mike PA-C 12/09/22 11:52 EST

## 2022-12-09 NOTE — OUTREACH NOTE
Prep Survey    Flowsheet Row Responses   [facility] facility patient discharged from? [place]   Is LACE score < 7 ? Yes   Emergency Room discharge w/ pulse ox? No   Eligibility [facility]   Date of Admission 12/08/22   Date of Discharge 12/09/22   Discharge Disposition Home or Self Care   Discharge diagnosis vertigo, short-term memory loss   Does the patient have one of the following disease processes/diagnoses(primary or secondary)? Other   Does the patient have Home health ordered? No   Is there a DME ordered? No   Prep survey completed? Yes          NIK MADISON - Registered Nurse

## 2022-12-12 ENCOUNTER — TRANSITIONAL CARE MANAGEMENT TELEPHONE ENCOUNTER (OUTPATIENT)
Dept: CALL CENTER | Facility: HOSPITAL | Age: 62
End: 2022-12-12

## 2022-12-12 DIAGNOSIS — G31.84 MILD COGNITIVE IMPAIRMENT: ICD-10-CM

## 2022-12-12 RX ORDER — MEMANTINE HYDROCHLORIDE 10 MG/1
10 TABLET ORAL 2 TIMES DAILY
Qty: 180 TABLET | Refills: 0 | Status: SHIPPED | OUTPATIENT
Start: 2022-12-12 | End: 2023-03-13

## 2022-12-12 NOTE — OUTREACH NOTE
Call Center TCM Note    Flowsheet Row Responses   List of hospitals in [place] patient discharged from? [place]   Does the patient have one of the following disease processes/diagnoses(primary or secondary)? Other   TCM attempt successful? Yes   Call start time 1020   Call end time 1022   Discharge diagnosis vertigo, short-term memory loss   Person spoke with today (if not patient) and relationship Patient   Meds reviewed with patient/caregiver? Yes   Is the patient having any side effects they believe may be caused by any medication additions or changes? No   Does the patient have all medications ordered at discharge? Yes   Is the patient taking all medications as directed (includes completed medication regime)? Yes   Does the patient have an appointment with their PCP within 7 days of discharge? No appointments available   Nursing Interventions Routed TCM call to PCP office   Psychosocial issues? No   Did the patient receive a copy of their discharge instructions? Yes   Nursing interventions Reviewed instructions with patient   What is the patient's perception of their health status since discharge? Improving   Is the patient/caregiver able to teach back signs and symptoms related to disease process for when to call PCP? Yes   Is the patient/caregiver able to teach back signs and symptoms related to disease process for when to call 911? Yes   Is the patient/caregiver able to teach back the hierarchy of who to call/visit for symptoms/problems? PCP, Specialist, Home health nurse, Urgent Care, ED, 911 Yes   If the patient is a current smoker, are they able to teach back resources for cessation? Not a smoker   TCM call completed? Yes   Wrap up additional comments Pt states he is doing better, and denies any dizziness since hospital dc. RN will send a message to PCP office to help pt make a hospital fu appt as there are no appts available that satisfy TCM guidelines. Pt had no questions/concerns.   Call end time 1022   Would  this patient benefit from a Referral to [facility] Social Work? No   Is the patient interested in additional calls from an ambulatory ?  NOTE:  applies to high risk patients requiring additional follow-up. No          Carlie Oliveros RN    12/12/2022, 10:23 EST

## 2023-01-12 DIAGNOSIS — G31.84 MILD COGNITIVE IMPAIRMENT: ICD-10-CM

## 2023-01-12 RX ORDER — DONEPEZIL HYDROCHLORIDE 10 MG/1
TABLET, FILM COATED ORAL
Qty: 90 TABLET | Refills: 1 | Status: SHIPPED | OUTPATIENT
Start: 2023-01-12

## 2023-01-19 ENCOUNTER — OFFICE VISIT (OUTPATIENT)
Dept: INTERNAL MEDICINE | Facility: CLINIC | Age: 63
End: 2023-01-19
Payer: COMMERCIAL

## 2023-01-19 VITALS
OXYGEN SATURATION: 95 % | HEIGHT: 70 IN | TEMPERATURE: 97.3 F | HEART RATE: 71 BPM | SYSTOLIC BLOOD PRESSURE: 134 MMHG | DIASTOLIC BLOOD PRESSURE: 84 MMHG | BODY MASS INDEX: 29.26 KG/M2 | WEIGHT: 204.4 LBS

## 2023-01-19 DIAGNOSIS — G31.84 MILD COGNITIVE IMPAIRMENT: ICD-10-CM

## 2023-01-19 DIAGNOSIS — G31.84 MILD COGNITIVE IMPAIRMENT WITH MEMORY LOSS: Primary | ICD-10-CM

## 2023-01-19 DIAGNOSIS — F90.9 ATTENTION DEFICIT HYPERACTIVITY DISORDER (ADHD), UNSPECIFIED ADHD TYPE: ICD-10-CM

## 2023-01-19 DIAGNOSIS — R45.4 IRRITABILITY AND ANGER: ICD-10-CM

## 2023-01-19 PROCEDURE — 99214 OFFICE O/P EST MOD 30 MIN: CPT | Performed by: FAMILY MEDICINE

## 2023-01-19 RX ORDER — BUPROPION HYDROCHLORIDE 150 MG/1
TABLET, EXTENDED RELEASE ORAL
Qty: 60 TABLET | Refills: 1 | Status: SHIPPED | OUTPATIENT
Start: 2023-01-19 | End: 2023-02-09

## 2023-01-19 NOTE — PROGRESS NOTES
"Date of Encounter: 2023  Patient: Biju Verduzco,  1960    Subjective   History of Present ing Illness  Chief complaint: Cognitive impairment    Patient presents for follow-up of cognitive impairment    Who recently underwent a neurocognitive assessment by Dr. Mckenzie, results not back yet, but he was told by the examiner that he was showing memory impairment \"like somebody who is in a nursing home with dementia\".  He has noticed frequent problems with memory related to work and home life.  He gave several examples which included forgetting someone who readmit and who had talked to with weeks earlier, that he had already met somebody when talking to them again, not being able to find his car in [place], difficulty with short-term recall of names.  This is causing him frustration, irritability, sadness, and depression. he is trying to hand over his job to his children and that is causing him a lot of stress as well.  He does not have any side effects on donepezil or memantine but has not noticed improvement in his memory.  Because of this recent neurocognitive test he is afraid he is worsening and wants to be as aggressive as possible to manage his memory impairment.    Sees Dr. Leary with neurology in about 1 month.    Review of Systems:  Negative for fever, cough, shortness of breath    The following portions of the patient's history were reviewed and updated as appropriate: allergies, current medications, past family history, past medical history, past social history, past surgical history and problem list.    Patient Active Problem List   Diagnosis   • ED (erectile dysfunction)   • Hypercholesteremia   • Attention deficit hyperactivity disorder (ADHD)   • Seasonal allergic rhinitis due to pollen   • Mild cognitive impairment with memory loss   • Biological false positive RPR test   • Irritability and anger   • Prediabetes   • Dizziness     Past Medical History:   Diagnosis Date   • ADD (attention " deficit disorder)    • Anxiety    • Atypical chest pain 3/6/2019   • Borderline systolic HTN 12/10/2019   • Depression    • ED (erectile dysfunction)    • Epicondylitis elbow, medial, left 6/4/2019   • Erectile dysfunction    • Hypercholesteremia    • Skin lesion of scalp 3/23/2020    Dermatologic follow-up   • Sleep disorder      Past Surgical History:   Procedure Laterality Date   • CARDIOVASCULAR STRESS TEST  2013    stress Echo     Family History   Problem Relation Age of Onset   • Hypothyroidism Mother        Current Outpatient Medications:   •  ASPIRIN 81 PO, Take 1 tablet by mouth Daily., Disp: , Rfl:   •  atorvastatin (LIPITOR) 10 MG tablet, Take 1 tab PO QD for cholesterol and heart health (Patient taking differently: Take 10 mg by mouth Daily.), Disp: 90 tablet, Rfl: 3  •  Cholecalciferol (VITAMIN D PO), Take 1 tablet by mouth Daily., Disp: , Rfl:   •  donepezil (ARICEPT) 10 MG tablet, TAKE 1 TABLET BY MOUTH EVERY DAY (Patient taking differently: Take 10 mg by mouth Every Night.), Disp: 90 tablet, Rfl: 1  •  meclizine (ANTIVERT) 25 MG tablet, Take 1 tablet by mouth 3 (Three) Times a Day As Needed for Dizziness., Disp: 30 tablet, Rfl: 2  •  memantine (NAMENDA) 10 MG tablet, Take 1 tablet by mouth 2 (Two) Times a Day for 90 days., Disp: 180 tablet, Rfl: 0  •  Multiple Vitamins-Minerals (MULTIVITAMIN ADULT PO), Take 1 tablet by mouth Daily., Disp: , Rfl:   •  Multiple Vitamins-Minerals (ZINC PO), Take 1 tablet by mouth Daily., Disp: , Rfl:   •  Thiamine HCl (VITAMIN B-1 PO), Take 1 tablet by mouth Daily., Disp: , Rfl:   •  vitamin C (ASCORBIC ACID) 250 MG tablet, Take 250 mg by mouth Daily., Disp: , Rfl:   •  vitamin E 200 UNIT capsule, Take 200 Units by mouth daily., Disp: , Rfl:   •  buPROPion SR (WELLBUTRIN SR) 150 MG 12 hr tablet, Take 1 tab PO BID, Disp: 60 tablet, Rfl: 1  No Known Allergies  Social History     Tobacco Use   • Smoking status: Never   Vaping Use   • Vaping Use: Never used   Substance Use  "Topics   • Alcohol use: Yes     Comment: OCC   • Drug use: No          Objective   Physical Exam  Vitals:    01/19/23 1052   BP: 134/84   BP Location: Left arm   Patient Position: Sitting   Cuff Size: Adult   Pulse: 71   Temp: 97.3 °F (36.3 °C)   TempSrc: Infrared   SpO2: 95%   Weight: 92.7 kg (204 lb 6.4 oz)   Height: 177.8 cm (70\")     Body mass index is 29.33 kg/m².    Constitutional: NAD.  Neuropsychiatric: Frustrated and irritable affect.  Congruent mood.  Mildly tangential but otherwise normal thought content.  Able to recall stories with good accuracy and include relevant details.  He does have a stutter and occasionally does not finish his sentences before starting a new one.  He is alert and oriented x4.  No SI or HI.  Fair insight and judgment.       Assessment & Plan   Assessment and Plan  Pleasant 62-year-old male with mild cognitive impairment, ADHD, hyperlipidemia, seasonal allergic rhinitis, who presents with the following:    Diagnoses and all orders for this visit:    1. Mild cognitive impairment with memory loss (Primary)  -     buPROPion SR (WELLBUTRIN SR) 150 MG 12 hr tablet; Take 1 tab PO BID  Dispense: 60 tablet; Refill: 1    2. Irritability and anger  -     buPROPion SR (WELLBUTRIN SR) 150 MG 12 hr tablet; Take 1 tab PO BID  Dispense: 60 tablet; Refill: 1    3. Attention deficit hyperactivity disorder (ADHD), unspecified ADHD type  -     buPROPion SR (WELLBUTRIN SR) 150 MG 12 hr tablet; Take 1 tab PO BID  Dispense: 60 tablet; Refill: 1    4. Mild cognitive impairment    Appears to be acute worsening of his cognitive impairment.  I think this is most likely due to comorbid mood and attention deficit disorder.  He may still have an underlying progressive dementia.  After some discussion I would like to start him on bupropion, I discussed the risks and benefits of this.  Benefits are short onset of action, adjunctive benefits for attention deficit as well as improvements in irritability and " depressive symptoms.  We will follow him closely in about 3 to 4 weeks, I will also forward this note to Dr. Leary.  We will await formal neurocognitive results as well.    Darren Pérez MD  Family Medicine  O: [phone]    Disclaimer: Parts of this note were dictated by speech recognition. Minor errors in transcription may be present. Please call if questions.

## 2023-01-19 NOTE — Clinical Note
Acute worsening of his memory likely related to comorbid anxiety, depression and attention deficit, I am starting him on bupropion with close follow-up, he sees you in about 1 month.

## 2023-01-27 ENCOUNTER — TELEPHONE (OUTPATIENT)
Dept: INTERNAL MEDICINE | Facility: CLINIC | Age: 63
End: 2023-01-27

## 2023-01-27 NOTE — TELEPHONE ENCOUNTER
Should go away if he continues, but he may have to be on this for another 2 to 3 weeks.  If it does not improve after that time please stop the medication and let me know

## 2023-01-27 NOTE — TELEPHONE ENCOUNTER
Caller: Biju Verduzco    Relationship: Self    Best call back number: [phone]    What medications are you currently taking:   Current Outpatient Medications on File Prior to Visit   Medication Sig Dispense Refill   • ASPIRIN 81 PO Take 1 tablet by mouth Daily.     • atorvastatin (LIPITOR) 10 MG tablet Take 1 tab PO QD for cholesterol and heart health (Patient taking differently: Take 10 mg by mouth Daily.) 90 tablet 3   • buPROPion SR (WELLBUTRIN SR) 150 MG 12 hr tablet Take 1 tab PO BID 60 tablet 1   • Cholecalciferol (VITAMIN D PO) Take 1 tablet by mouth Daily.     • donepezil (ARICEPT) 10 MG tablet TAKE 1 TABLET BY MOUTH EVERY DAY (Patient taking differently: Take 10 mg by mouth Every Night.) 90 tablet 1   • meclizine (ANTIVERT) 25 MG tablet Take 1 tablet by mouth 3 (Three) Times a Day As Needed for Dizziness. 30 tablet 2   • memantine (NAMENDA) 10 MG tablet Take 1 tablet by mouth 2 (Two) Times a Day for 90 days. 180 tablet 0   • Multiple Vitamins-Minerals (MULTIVITAMIN ADULT PO) Take 1 tablet by mouth Daily.     • Multiple Vitamins-Minerals (ZINC PO) Take 1 tablet by mouth Daily.     • Thiamine HCl (VITAMIN B-1 PO) Take 1 tablet by mouth Daily.     • vitamin C (ASCORBIC ACID) 250 MG tablet Take 250 mg by mouth Daily.     • vitamin E 200 UNIT capsule Take 200 Units by mouth daily.       No current facility-administered medications on file prior to visit.          When did you start taking these medications: 1/17/23    Which medication are you concerned about: buPROPion SR (WELLBUTRIN SR) 150 MG 12 hr tablet    Who prescribed you this medication: DR GRADY    What are your concerns: PATIENT STATED THAT HE HAS NOTICED A CONSTANT RINGING IN HIS EARS ESPECIALLY WHEN HE TAKES THE SECOND DOSE IN THE AFTERNOON.PATIENT IS REQUESTING A CALLBACK TO KNOW IF THIS IS NORMAL OR IF IT WILL GO AWAY AND IF HE SHOULD CONTINUE TO TAKE THE MEDICATION    How long have you had these concerns: 1/24/23 AFTERNOON BUT 1/26/23 NIGHT  IT WAS VERY NOTICEABLE

## 2023-02-09 ENCOUNTER — OFFICE VISIT (OUTPATIENT)
Dept: INTERNAL MEDICINE | Facility: CLINIC | Age: 63
End: 2023-02-09
Payer: COMMERCIAL

## 2023-02-09 VITALS
WEIGHT: 204 LBS | OXYGEN SATURATION: 97 % | HEIGHT: 70 IN | HEART RATE: 64 BPM | TEMPERATURE: 97.5 F | DIASTOLIC BLOOD PRESSURE: 69 MMHG | SYSTOLIC BLOOD PRESSURE: 130 MMHG | BODY MASS INDEX: 29.2 KG/M2

## 2023-02-09 DIAGNOSIS — R45.4 IRRITABILITY AND ANGER: Primary | ICD-10-CM

## 2023-02-09 DIAGNOSIS — F90.9 ATTENTION DEFICIT HYPERACTIVITY DISORDER (ADHD), UNSPECIFIED ADHD TYPE: ICD-10-CM

## 2023-02-09 DIAGNOSIS — N52.9 ERECTILE DYSFUNCTION, UNSPECIFIED ERECTILE DYSFUNCTION TYPE: ICD-10-CM

## 2023-02-09 PROCEDURE — 99214 OFFICE O/P EST MOD 30 MIN: CPT | Performed by: FAMILY MEDICINE

## 2023-02-09 RX ORDER — VENLAFAXINE HYDROCHLORIDE 37.5 MG/1
CAPSULE, EXTENDED RELEASE ORAL
Qty: 90 CAPSULE | Refills: 3 | Status: SHIPPED | OUTPATIENT
Start: 2023-02-09

## 2023-02-09 RX ORDER — SILDENAFIL 50 MG/1
50 TABLET, FILM COATED ORAL DAILY PRN
Qty: 30 TABLET | Refills: 3 | Status: SHIPPED | OUTPATIENT
Start: 2023-02-09

## 2023-02-09 NOTE — PROGRESS NOTES
Date of Encounter: 2023  Patient: Biju Verduzco,  1960    Subjective   History of Presenting Illness  Chief complaint: Follow-up irritability and anger, memory problems    Patient tried bupropion and had tinnitus, held it and symptoms went away, restarted and that came back.  So he did up stopping this medication.  Wife felt it did help with irritability but he did not notice any benefit.  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  He does have a history of being on venlafaxine which did help with his concentration and mood.  He did not recall any side effects    He has been reading a lot about Viagra and he would like to try that for erectile dysfunction as well as memory.    The following portions of the patient's history were reviewed and updated as appropriate: allergies, current medications, past family history, past medical history, past social history, past surgical history and problem list.    Patient Active Problem List   Diagnosis   • ED (erectile dysfunction)   • Hypercholesteremia   • Attention deficit hyperactivity disorder (ADHD)   • Seasonal allergic rhinitis due to pollen   • Mild cognitive impairment with memory loss   • Biological false positive RPR test   • Irritability and anger   • Prediabetes   • Dizziness     Past Medical History:   Diagnosis Date   • ADD (attention deficit disorder)    • Anxiety    • Atypical chest pain 3/6/2019   • Borderline systolic HTN 12/10/2019   • Depression    • ED (erectile dysfunction)    • Epicondylitis elbow, medial, left 2019   • Erectile dysfunction    • Hypercholesteremia    • Skin lesion of scalp 3/23/2020    Dermatologic follow-up   • Sleep disorder      Past Surgical History:   Procedure Laterality Date   • CARDIOVASCULAR STRESS TEST  2013    stress Echo     Family History   Problem Relation Age of Onset   • Hypothyroidism Mother   "      Current Outpatient Medications:   •  ASPIRIN 81 PO, Take 1 tablet by mouth Daily., Disp: , Rfl:   •  atorvastatin (LIPITOR) 10 MG tablet, Take 1 tab PO QD for cholesterol and heart health (Patient taking differently: Take 10 mg by mouth Daily.), Disp: 90 tablet, Rfl: 3  •  Cholecalciferol (VITAMIN D PO), Take 1 tablet by mouth Daily., Disp: , Rfl:   •  donepezil (ARICEPT) 10 MG tablet, TAKE 1 TABLET BY MOUTH EVERY DAY (Patient taking differently: Take 10 mg by mouth Every Night.), Disp: 90 tablet, Rfl: 1  •  meclizine (ANTIVERT) 25 MG tablet, Take 1 tablet by mouth 3 (Three) Times a Day As Needed for Dizziness., Disp: 30 tablet, Rfl: 2  •  memantine (NAMENDA) 10 MG tablet, Take 1 tablet by mouth 2 (Two) Times a Day for 90 days., Disp: 180 tablet, Rfl: 0  •  Multiple Vitamins-Minerals (MULTIVITAMIN ADULT PO), Take 1 tablet by mouth Daily., Disp: , Rfl:   •  Multiple Vitamins-Minerals (ZINC PO), Take 1 tablet by mouth Daily., Disp: , Rfl:   •  Thiamine HCl (VITAMIN B-1 PO), Take 1 tablet by mouth Daily., Disp: , Rfl:   •  vitamin C (ASCORBIC ACID) 250 MG tablet, Take 250 mg by mouth Daily., Disp: , Rfl:   •  vitamin E 200 UNIT capsule, Take 200 Units by mouth daily., Disp: , Rfl:   •  sildenafil (Viagra) 50 MG tablet, Take 1 tablet by mouth Daily As Needed for Erectile Dysfunction., Disp: 30 tablet, Rfl: 3  •  venlafaxine XR (EFFEXOR-XR) 37.5 MG 24 hr capsule, Take 1 tab PO QD for mood and concentration, Disp: 90 capsule, Rfl: 3  Allergies   Allergen Reactions   • Wellbutrin [Bupropion] Tinnitus     Social History     Tobacco Use   • Smoking status: Never   Vaping Use   • Vaping Use: Never used   Substance Use Topics   • Alcohol use: Yes     Comment: OCC   • Drug use: No          Objective   Physical Exam  Vitals:    02/09/23 1111   BP: 130/69   Pulse: 64   Temp: 97.5 °F (36.4 °C)   TempSrc: Temporal   SpO2: 97%   Weight: 92.5 kg (204 lb)   Height: 177.8 cm (70\")     Body mass index is 29.27 " kg/m².    Constitutional: NAD.  Psychiatric: Irritable affect.  Rapid but nonpressured speech.  Frequent stuttering.  Word finding difficulties.  Normal thought content.  Good insight and judgment.     Assessment & Plan   Assessment and Plan  Pleasant 62-year-old male with mild cognitive impairment, ADHD, hyperlipidemia, seasonal allergic rhinitis, who presents with the following:    Diagnoses and all orders for this visit:    1. Irritability and anger (Primary): Discussed risks and benefits of venlafaxine, may help with concentration and irritability while awaiting neurology consultation for memory loss  -     venlafaxine XR (EFFEXOR-XR) 37.5 MG 24 hr capsule; Take 1 tab PO QD for mood and concentration  Dispense: 90 capsule; Refill: 3  2. Attention deficit hyperactivity disorder (ADHD), unspecified ADHD type  -     venlafaxine XR (EFFEXOR-XR) 37.5 MG 24 hr capsule; Take 1 tab PO QD for mood and concentration  Dispense: 90 capsule; Refill: 3    3. Erectile dysfunction, unspecified erectile dysfunction type: Discussed risks and benefits of sildenafil  -     sildenafil (Viagra) 50 MG tablet; Take 1 tablet by mouth Daily As Needed for Erectile Dysfunction.  Dispense: 30 tablet; Refill: 3      Darren Pérez MD  Family Medicine  O: [phone]    Disclaimer: Parts of this note were dictated by speech recognition. Minor errors in transcription may be present. Please call if questions.

## 2023-02-10 ENCOUNTER — OUTSIDE FACILITY SERVICE (OUTPATIENT)
Dept: GASTROENTEROLOGY | Facility: CLINIC | Age: 63
End: 2023-02-10
Payer: COMMERCIAL

## 2023-02-10 PROCEDURE — 45378 DIAGNOSTIC COLONOSCOPY: CPT | Performed by: INTERNAL MEDICINE

## 2023-02-17 ENCOUNTER — OFFICE VISIT (OUTPATIENT)
Dept: NEUROLOGY | Facility: CLINIC | Age: 63
End: 2023-02-17
Payer: COMMERCIAL

## 2023-02-17 VITALS
OXYGEN SATURATION: 99 % | DIASTOLIC BLOOD PRESSURE: 78 MMHG | HEIGHT: 70 IN | SYSTOLIC BLOOD PRESSURE: 126 MMHG | BODY MASS INDEX: 29.09 KG/M2 | HEART RATE: 74 BPM | WEIGHT: 203.2 LBS

## 2023-02-17 DIAGNOSIS — G31.84 MILD COGNITIVE IMPAIRMENT: Primary | ICD-10-CM

## 2023-02-17 PROCEDURE — 99214 OFFICE O/P EST MOD 30 MIN: CPT | Performed by: PSYCHIATRY & NEUROLOGY

## 2023-02-17 NOTE — PROGRESS NOTES
Chief Complaint   Patient presents with   • Memory Loss       Patient ID: Biju Verduzco is a 62 y.o. male.    HPI: I had the pleasure of seeing your patient again today.  As you may know he is a 62-year-old gentleman here for the management of cognitive Bradley.  We did send him for neuropsychological evaluation.  This did show evidence for mild cognitive impairment, amnestic type.  Thus far he has not had any significant changes from last follow-up.  He is doing well with respect to his job.  He is able to maintain finances.  He is still somewhat forgetful.  He is able to drive.  No issues with preparing meals.  No interval development of resting tremor.  No changes in gait.  He is on both Aricept and Namenda.  He tolerates them well.    The following portions of the patient's history were reviewed and updated as appropriate: allergies, current medications, past family history, past medical history, past social history, past surgical history and problem list.    Review of Systems   Constitutional: Negative for activity change, appetite change, chills, diaphoresis, fatigue, fever and unexpected weight change.   HENT: Negative for congestion, dental problem, drooling, ear discharge, ear pain, facial swelling, hearing loss, mouth sores, nosebleeds, postnasal drip, rhinorrhea, sinus pressure, sinus pain, sneezing, sore throat, tinnitus, trouble swallowing and voice change.    Eyes: Negative for photophobia, pain, discharge, redness, itching and visual disturbance.   Musculoskeletal: Negative for arthralgias, back pain, gait problem, joint swelling, myalgias, neck pain and neck stiffness.   Neurological: Negative for dizziness, tremors, seizures, syncope, facial asymmetry, speech difficulty, weakness, light-headedness, numbness and headaches.   Psychiatric/Behavioral: Positive for decreased concentration. Negative for agitation, behavioral problems, confusion, dysphoric mood, hallucinations, self-injury, sleep  disturbance and suicidal ideas. The patient is not nervous/anxious and is not hyperactive.       I have reviewed the review of systems above performed by my medical assistant.      Vitals:    23 1614   BP: 126/78   Pulse: 74   SpO2: 99%       Neurologic Exam     Mental Status   Oriented to person, place, and time.   Concentration: normal.   Level of consciousness: alert  Knowledge: consistent with education (No deficits found.).     Cranial Nerves     CN II   Visual fields full to confrontation.     CN III, IV, VI   Pupils are equal, round, and reactive to light.  Extraocular motions are normal.   CN III: no CN III palsy  CN VI: no CN VI palsy    CN V   Facial sensation intact.     CN VII   Facial expression full, symmetric.     CN VIII   CN VIII normal.     CN IX, X   CN IX normal.   CN X normal.     CN XI   CN XI normal.     CN XII   CN XII normal.     Motor Exam     Strength   Right neck flexion: 5/5  Left neck flexion: 5/5  Right neck extension: 5/5  Left neck extension: 5/5  Right deltoid: 5/5  Left deltoid: 5/5  Right biceps: 5/5  Left biceps: 5/5  Right triceps: 5/5  Left triceps: 5/5  Right wrist flexion: 5/5  Left wrist flexion: 5/5  Right wrist extension: 5/5  Left wrist extension: 5/5  Right interossei: 5/5  Left interossei: 5/5  Right abdominals: 5/5  Left abdominals: 5/5  Right iliopsoas: 5/5  Left iliopsoas: 5/5  Right quadriceps: 5/5  Left quadriceps: 5/5  Right hamstrin/5  Left hamstrin/5  Right glutei: 5/5  Left glutei: 5/5  Right anterior tibial: 5/5  Left anterior tibial: 5/5  Right posterior tibial: 5/5  Left posterior tibial: 5/5  Right peroneal: 5/5  Left peroneal: 5/5  Right gastroc: 5/5  Left gastroc: 5/5    Sensory Exam   Light touch normal.   Vibration normal.     Gait, Coordination, and Reflexes     Gait  Gait: normal    Reflexes   Right brachioradialis: 2+  Left brachioradialis: 2+  Right biceps: 2+  Left biceps: 2+  Right triceps: 2+  Left triceps: 2+  Right patellar:  2+  Left patellar: 2+  Right achilles: 2+  Left achilles: 2+  Right : 2+  Left : 2+Station is normal.       Physical Exam  Vitals reviewed.   Constitutional:       Appearance: He is well-developed.   HENT:      Head: Normocephalic and atraumatic.   Eyes:      Extraocular Movements: EOM normal.      Pupils: Pupils are equal, round, and reactive to light.   Cardiovascular:      Rate and Rhythm: Normal rate and regular rhythm.   Pulmonary:      Breath sounds: Normal breath sounds.   Musculoskeletal:         General: Normal range of motion.   Skin:     General: Skin is warm.   Neurological:      Mental Status: He is oriented to person, place, and time.      Gait: Gait is intact.      Deep Tendon Reflexes:      Reflex Scores:       Tricep reflexes are 2+ on the right side and 2+ on the left side.       Bicep reflexes are 2+ on the right side and 2+ on the left side.       Brachioradialis reflexes are 2+ on the right side and 2+ on the left side.       Patellar reflexes are 2+ on the right side and 2+ on the left side.       Achilles reflexes are 2+ on the right side and 2+ on the left side.        Procedures    Assessment/Plan: We are going to Continue both the Aricept and Namenda.  I did talk to them at great length about socialization, good diet, physical exercise and cognitive exercise.  We will see him back in 4 months or sooner if needed.  A total of 30 minutes was spent face-to-face with patient today.  Of that greater than 50% of this time was spent discussing signs and symptoms of mild cognitive impairment, patient education, plan of care and prognosis.       Diagnoses and all orders for this visit:    1. Mild cognitive impairment (Primary)           Ethan Leary II, MD

## 2023-03-13 DIAGNOSIS — G31.84 MILD COGNITIVE IMPAIRMENT: ICD-10-CM

## 2023-03-13 RX ORDER — MEMANTINE HYDROCHLORIDE 10 MG/1
10 TABLET ORAL 2 TIMES DAILY
Qty: 180 TABLET | Refills: 0 | Status: SHIPPED | OUTPATIENT
Start: 2023-03-13 | End: 2023-06-11

## 2023-05-26 ENCOUNTER — TELEPHONE (OUTPATIENT)
Dept: NEUROLOGY | Facility: CLINIC | Age: 63
End: 2023-05-26
Payer: COMMERCIAL

## 2023-08-14 ENCOUNTER — OFFICE VISIT (OUTPATIENT)
Dept: INTERNAL MEDICINE | Facility: CLINIC | Age: 63
End: 2023-08-14
Payer: COMMERCIAL

## 2023-08-14 VITALS
OXYGEN SATURATION: 97 % | WEIGHT: 199.2 LBS | HEART RATE: 69 BPM | BODY MASS INDEX: 28.52 KG/M2 | TEMPERATURE: 98 F | HEIGHT: 70 IN | SYSTOLIC BLOOD PRESSURE: 128 MMHG | DIASTOLIC BLOOD PRESSURE: 70 MMHG

## 2023-08-14 DIAGNOSIS — R23.8 SKIN IRRITATION: Primary | ICD-10-CM

## 2023-08-14 DIAGNOSIS — G31.84 MILD COGNITIVE IMPAIRMENT WITH MEMORY LOSS: ICD-10-CM

## 2023-08-14 DIAGNOSIS — R45.4 IRRITABILITY AND ANGER: ICD-10-CM

## 2023-08-14 DIAGNOSIS — F90.9 ATTENTION DEFICIT HYPERACTIVITY DISORDER (ADHD), UNSPECIFIED ADHD TYPE: ICD-10-CM

## 2023-08-14 PROCEDURE — 99213 OFFICE O/P EST LOW 20 MIN: CPT | Performed by: NURSE PRACTITIONER

## 2023-08-16 ENCOUNTER — TELEPHONE (OUTPATIENT)
Dept: NEUROLOGY | Facility: CLINIC | Age: 63
End: 2023-08-16
Payer: COMMERCIAL

## 2023-08-16 DIAGNOSIS — G31.84 MILD COGNITIVE IMPAIRMENT: Primary | ICD-10-CM

## 2023-08-16 RX ORDER — MEMANTINE HYDROCHLORIDE 10 MG/1
10 TABLET ORAL 2 TIMES DAILY
Qty: 60 TABLET | Refills: 5 | Status: SHIPPED | OUTPATIENT
Start: 2023-08-16

## 2023-08-16 NOTE — TELEPHONE ENCOUNTER
Spoke to patient. Confirmed pt is supposed to be taking BID he will will finish out script picked up from pharmacy. We will send in correct script that states twice a day. Pt states understanding.

## 2023-08-16 NOTE — TELEPHONE ENCOUNTER
Caller: ARLIN     Ton call back number: [phone]      What was the call regarding: PT PICKED UP RX AND HIS MEMANTINE RX SAYS  TO TAKE 1TAB ONCE A DAY .  HIS LAST BOTTLE  BEFORE  SAID TO TAKE 1 TAB TWICE A DAY?     YOUR RX SENT IN  SAYS      Order Reconciliation Actions     E-Prescribing Status    Outpatient Medication Detail    memantine (NAMENDA) 10 MG tablet        Sig: Take 1 tablet by mouth 2 (Two) Times a Day.        Sent to pharmacy as: Memantine HCl 10 MG Oral Tablet (NAMENDA)        Class: Normal        Route: Oral        E-Prescribing Status: Receipt confirmed by pharmacy (6/20/2023  4:02 PM EDT)          PLEASE CALL PT TO ADVISE

## 2023-10-03 ENCOUNTER — OFFICE VISIT (OUTPATIENT)
Dept: SLEEP MEDICINE | Facility: HOSPITAL | Age: 63
End: 2023-10-03
Payer: COMMERCIAL

## 2023-10-03 VITALS — OXYGEN SATURATION: 98 % | HEIGHT: 70 IN | BODY MASS INDEX: 28.55 KG/M2 | WEIGHT: 199.4 LBS | HEART RATE: 67 BPM

## 2023-10-03 DIAGNOSIS — G47.14 HYPERSOMNIA DUE TO MEDICAL CONDITION: ICD-10-CM

## 2023-10-03 DIAGNOSIS — G47.33 OSA (OBSTRUCTIVE SLEEP APNEA): Primary | ICD-10-CM

## 2023-10-03 PROCEDURE — G0463 HOSPITAL OUTPT CLINIC VISIT: HCPCS

## 2023-10-03 NOTE — PROGRESS NOTES
[facility] New Patient[facility]Consultation       Reason for Consultation: ARSH    Patient Care Team:  Darren Pérez MD as PCP - General (Family Medicine)  Dwayne Murillo MD as Consulting Physician (Sleep Medicine)    Chief complaint: ARSH    History of present illness:    Thank you for asking me to see your patient.  The patient is a 63 y.o. male who was diagnosed with mild obstructive sleep apnea, AHI 9.9 events per hour, moderately abnormal when supine at 23.8 events per hour, no sleep-related hypoxia, no overnight polysomnogram 2006, weight 185 pounds.  The patient was treated with CPAP/BiPAP.  However, he is currently not using it.  The patient reports dreaming a lot during the night.  He awakens during the night.  3-4 times a week, dreams are very busy.  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 Several days later, the patient's father  of a myocardial infarction.    The patient goes to bed at 10 PM and gets out of bed between 7:30 AM and 8 AM.  He falls asleep within 10 minutes.  He is sleepy upon arising.  Sometimes he will take a nap.  [place] Sleepiness Scale is abnormal at 14.  He has had sleep paralysis in the past.  His sleep is generally restless.  He will use the restroom 2 times during the nighttime.    Review of Systems:    A complete review of systems was done and all were negative with the exception of the above    History:  Past Medical History:   Diagnosis Date    ADD (attention deficit disorder)     Anxiety     Atypical chest pain 2019    Borderline systolic HTN 12/10/2019    Depression     ED (erectile dysfunction)     Epicondylitis elbow, medial, left 2019    Erectile dysfunction     Hypercholesteremia     ARSH (obstructive sleep apnea) 2006    Overnight polysomnogram.  Weight 185 pounds.  Mild ARSH with AHI 9.9 events per hour.  When supine, moderately abnormal at 23.8 events per hour.  No sleep-related hypoxia.     "Skin lesion of scalp 03/23/2020    Dermatologic follow-up   ,   Past Surgical History:   Procedure Laterality Date    CARDIOVASCULAR STRESS TEST  2013    stress Echo   ,   Family History   Problem Relation Age of Onset    Hypothyroidism Mother     Heart disease Father    , and   Social History     Socioeconomic History    Marital status:    Tobacco Use    Smoking status: Never    Smokeless tobacco: Never   Vaping Use    Vaping Use: Never used   Substance and Sexual Activity    Alcohol use: Yes     Comment: 6/week    Drug use: No    Sexual activity: Defer     E-cigarette/Vaping    E-cigarette/Vaping Use Never User      E-cigarette/Vaping Substances    Nicotine No     THC No     CBD No     Flavoring No      E-cigarette/Vaping Devices    Disposable No     Pre-filled or Refillable Cartridge No     Refillable Tank No     Pre-filled Pod No       Social History: He is self-employed.  No caffeine.    Allergies:  Wellbutrin [bupropion]     Medication Review: Memantine venlafaxine atorvastatin donpapegil    Vital Signs:    Vitals:    10/03/23 0930   Pulse: 67   SpO2: 98%   Weight: 90.4 kg (199 lb 6.4 oz)   Height: 177.8 cm (70\")      Body mass index is 28.61 kg/m².  Neck Circumference: 15.5 inches      Physical Exam:    Constitutional:  Well developed 63 y.o. male that appears in no apparent distress.  Awake & oriented times 3.  Normal mood with normal recent and remote memory and normal judgement.  Eyes:  Conjunctivae normal.  Oropharynx: Moist mucous membranes without exudate and a large tongue and class III Mallampati airway.    Neck: Trachea midline  Respiratory: Effort is not labored  Cardiovascular: Radial pulse regular  Musculoskeletal: Gait appears normal, no digital clubbing evident, no pre-tibial edema    Results Review: I reviewed the results of his overnight polysomnogram from 7/16/2006 and his CPAP titration study from 9/10/2006.    Impression:   Mild obstructive sleep apnea, moderate severity when supine, " no sleep-related hypoxia, by overnight polysomnogram 7/16/2006, weight 185 pounds.  Presently, the patient's obstructive sleep apnea is not treated.  The patient demonstrates symptoms and signs consistent with obstructive sleep apnea.  The patient's weight is 10-15 pounds heavier than when he was diagnosed.  The patient has complaints of hypersomnolence.    Plan:  Good sleep hygiene measures should be maintained.  Some weight loss would be beneficial in this patient who is overweight by Body mass index is 28.61 kg/m²..    Pathophysiology of ARSH described to the patient.  Cardiovascular complications of untreated ARSH also reviewed.  I also reviewed how obstructive sleep apnea can worsen with weight gain.    Additionally, we discussed his dreams and he realizes that he is not dreaming things to predict the future.    Additionally, due to the patient's mild cognitive impairment, treating sleep apnea could possibly provide some improvement?    After reviewing all with the patient, I would recommend and he is agreeable to proceed with home sleep study to reevaluate severity obstructive sleep apnea that could still be there.  That procedure was described to the patient and I answered all of his questions.  Additionally, I reviewed oral appliance versus CPAP therapy if indicated.  Again, answered all of his questions.  Home sleep study will be scheduled and further recommendations will be made once those results are known.    Thank you for requesting me to assist in this patient's care.    Dwayne Murilol MD  Sleep Medicine  10/03/23  10:08 EDT

## 2023-10-06 PROBLEM — G47.14 HYPERSOMNIA DUE TO MEDICAL CONDITION: Status: ACTIVE | Noted: 2023-10-06

## 2023-10-16 ENCOUNTER — HOSPITAL ENCOUNTER (OUTPATIENT)
Dept: SLEEP MEDICINE | Facility: HOSPITAL | Age: 63
Discharge: HOME | End: 2023-10-16
Admitting: INTERNAL MEDICINE
Payer: COMMERCIAL

## 2023-10-16 DIAGNOSIS — G47.33 OSA (OBSTRUCTIVE SLEEP APNEA): ICD-10-CM

## 2023-10-16 PROCEDURE — 95806 SLEEP STUDY UNATT&RESP EFFT: CPT

## 2023-10-26 DIAGNOSIS — E78.00 HYPERCHOLESTEREMIA: ICD-10-CM

## 2023-10-26 RX ORDER — ATORVASTATIN CALCIUM 10 MG/1
TABLET, FILM COATED ORAL
Qty: 90 TABLET | Refills: 3 | Status: SHIPPED | OUTPATIENT
Start: 2023-10-26

## 2023-10-26 NOTE — TELEPHONE ENCOUNTER
Requested Renewals     Name from pharmacy: ATORVASTATIN 10 MG TABLET         Will file in chart as: atorvastatin (LIPITOR) 10 MG tablet    Sig: TAKE 1 TABLET BY MOUTH ONCE DAILY FOR CHOLESTEROL AND HEART HEALTH    Disp: 90 tablet    Refills: 3    Start: 10/26/2023    Class: Normal    Non-formulary For: Hypercholesteremia    Last ordered: 1 year ago (10/24/2022) by Darren Pérez MD    Last refill: 7/24/2023    Rx #: [id number]    [facility]MG-CoA Reductase Inhibitors (Statins) Protocol Lzbxwn39/26/2023 01:44 AM    Lipid panel in past 12 months    ALK Phos in past 12 months    ALT in past 12 months    AST in past 12 months    Recent or future visit with authorizing provider      To be filled at: [facility]/pharmacy #[id number] - [place], [place] - [address] - [phone]  - [phone] FX

## 2023-11-10 DIAGNOSIS — G31.84 MILD COGNITIVE IMPAIRMENT: ICD-10-CM

## 2023-11-10 RX ORDER — MEMANTINE HYDROCHLORIDE 10 MG/1
10 TABLET ORAL DAILY
Qty: 90 TABLET | Refills: 3 | Status: SHIPPED | OUTPATIENT
Start: 2023-11-10

## 2023-12-12 ENCOUNTER — OFFICE VISIT (OUTPATIENT)
Dept: INTERNAL MEDICINE | Facility: CLINIC | Age: 63
End: 2023-12-12
Payer: COMMERCIAL

## 2023-12-12 VITALS
WEIGHT: 204 LBS | SYSTOLIC BLOOD PRESSURE: 152 MMHG | OXYGEN SATURATION: 96 % | HEIGHT: 70 IN | BODY MASS INDEX: 29.2 KG/M2 | HEART RATE: 69 BPM | TEMPERATURE: 97.6 F | DIASTOLIC BLOOD PRESSURE: 88 MMHG

## 2023-12-12 DIAGNOSIS — Z13.89 SCREENING FOR BLOOD OR PROTEIN IN URINE: ICD-10-CM

## 2023-12-12 DIAGNOSIS — Z12.5 SCREENING FOR MALIGNANT NEOPLASM OF PROSTATE: ICD-10-CM

## 2023-12-12 DIAGNOSIS — Z23 NEED FOR IMMUNIZATION AGAINST INFLUENZA: ICD-10-CM

## 2023-12-12 DIAGNOSIS — Z23 NEED FOR COVID-19 VACCINE: ICD-10-CM

## 2023-12-12 DIAGNOSIS — J20.9 ACUTE BRONCHITIS WITH SYMPTOMS > 10 DAYS: Primary | ICD-10-CM

## 2023-12-12 DIAGNOSIS — G31.84 MILD COGNITIVE IMPAIRMENT: ICD-10-CM

## 2023-12-12 DIAGNOSIS — R73.03 PREDIABETES: ICD-10-CM

## 2023-12-12 DIAGNOSIS — E78.00 HYPERCHOLESTEREMIA: ICD-10-CM

## 2023-12-12 PROBLEM — L90.5 BURN SCAR CONTRACTURE OF MULTIPLE SITES: Status: ACTIVE | Noted: 2023-12-12

## 2023-12-12 RX ORDER — DONEPEZIL HYDROCHLORIDE 10 MG/1
10 TABLET, FILM COATED ORAL NIGHTLY
Qty: 90 TABLET | Refills: 3 | Status: SHIPPED | OUTPATIENT
Start: 2023-12-12

## 2023-12-12 RX ORDER — AZITHROMYCIN 250 MG/1
TABLET, FILM COATED ORAL
Qty: 6 TABLET | Refills: 0 | Status: SHIPPED | OUTPATIENT
Start: 2023-12-12

## 2023-12-12 NOTE — PROGRESS NOTES
Date of Encounter: 2023  Patient: Biju Verduzco,  1960    Subjective   History of Presenting Illness  Chief complaint: Follow-up cognitive impairment, also cough    3 weeks of productive cough with clear sputum, not improving.  Describes it as fairly copious.  Have an upper respiratory illness alongside his son several weeks ago, had fever and chills, did not test for COVID.  Denies shortness of breath, loss of taste and smell, chest discomfort, gastrointestinal symptoms.  Not taking medication for this right now.    Cognitive impairment, has been started on Namenda and donepezil alongside his Effexor and has had improvement in his cognitive symptoms, he is also more comfortable with his limitations and has been able to work around this at work.  He also feels that using his CPAP has been helpful.    Had excellent relief from his burning contracture with scraping.  He is shocked that for over 30 years he has been dealing with this and no one had ever recommended it.    Okay for his annual blood work and COVID/influenza vaccine.    The following portions of the patient's history were reviewed and updated as appropriate: allergies, current medications, past family history, past medical history, past social history, past surgical history and problem list.    Patient Active Problem List   Diagnosis    ED (erectile dysfunction)    Hypercholesteremia    Attention deficit hyperactivity disorder (ADHD)    Seasonal allergic rhinitis due to pollen    Mild cognitive impairment with memory loss    Biological false positive RPR test    Irritability and anger    Prediabetes    Dizziness    ARSH (obstructive sleep apnea)    Hypersomnia due to medical condition    Burn scar contracture of multiple sites     Past Medical History:   Diagnosis Date    ADD (attention deficit disorder)     Anxiety     Atypical chest pain 2019    Borderline systolic HTN 12/10/2019    Depression     ED (erectile dysfunction)      Epicondylitis elbow, medial, left 06/04/2019    Erectile dysfunction     Hypercholesteremia     ARSH (obstructive sleep apnea) 07/16/2006    Overnight polysomnogram.  Weight 185 pounds.  Mild ARSH with AHI 9.9 events per hour.  When supine, moderately abnormal at 23.8 events per hour.  No sleep-related hypoxia.    Skin lesion of scalp 03/23/2020    Dermatologic follow-up     Past Surgical History:   Procedure Laterality Date    CARDIOVASCULAR STRESS TEST  2013    stress Echo     Family History   Problem Relation Age of Onset    Hypothyroidism Mother     Heart disease Father        Current Outpatient Medications:     ASPIRIN 81 PO, Take 1 tablet by mouth Daily., Disp: , Rfl:     atorvastatin (LIPITOR) 10 MG tablet, TAKE 1 TABLET BY MOUTH ONCE DAILY FOR CHOLESTEROL AND HEART HEALTH, Disp: 90 tablet, Rfl: 3    Cholecalciferol (VITAMIN D PO), Take 1 tablet by mouth Daily., Disp: , Rfl:     donepezil (ARICEPT) 10 MG tablet, Take 1 tablet by mouth Every Night., Disp: 90 tablet, Rfl: 3    meclizine (ANTIVERT) 25 MG tablet, Take 1 tablet by mouth 3 (Three) Times a Day As Needed for Dizziness., Disp: 30 tablet, Rfl: 2    memantine (NAMENDA) 10 MG tablet, TAKE 1 TABLET BY MOUTH EVERY DAY, Disp: 90 tablet, Rfl: 3    Multiple Vitamins-Minerals (MULTIVITAMIN ADULT PO), Take 1 tablet by mouth Daily., Disp: , Rfl:     Multiple Vitamins-Minerals (ZINC PO), Take 1 tablet by mouth Daily., Disp: , Rfl:     sildenafil (Viagra) 50 MG tablet, Take 1 tablet by mouth Daily As Needed for Erectile Dysfunction., Disp: 30 tablet, Rfl: 3    Thiamine HCl (VITAMIN B-1 PO), Take 1 tablet by mouth Daily., Disp: , Rfl:     venlafaxine XR (EFFEXOR-XR) 37.5 MG 24 hr capsule, Take 1 tab PO QD for mood and concentration, Disp: 90 capsule, Rfl: 3    vitamin C (ASCORBIC ACID) 250 MG tablet, Take 1 tablet by mouth Daily., Disp: , Rfl:     vitamin E 200 UNIT capsule, Take 1 capsule by mouth Daily., Disp: , Rfl:     azithromycin (Zithromax) 250 MG tablet, Take  "2 tablets the first day, then 1 tablet daily for 4 days., Disp: 6 tablet, Rfl: 0  Allergies   Allergen Reactions    Wellbutrin [Bupropion] Tinnitus     Social History     Tobacco Use    Smoking status: Never    Smokeless tobacco: Never   Vaping Use    Vaping Use: Never used   Substance Use Topics    Alcohol use: Yes     Comment: 6/week    Drug use: No          Objective   Physical Exam  Vitals:    12/12/23 0857   BP: 152/88   BP Location: Right arm   Patient Position: Sitting   Cuff Size: Adult   Pulse: 69   Temp: 97.6 °F (36.4 °C)   TempSrc: Infrared   SpO2: 96%   Weight: 92.5 kg (204 lb)   Height: 177.8 cm (70\")     Body mass index is 29.27 kg/m².    Constitutional: NAD.  Eyes: EOMI. PERRLA. Normal conjunctiva.  Ear, nose, mouth, throat: No tonsillar exudates or erythema.   Normal nasal mucosa. Normal external ear canals and TMs bilaterally.  Cardiovascular: RRR. No murmurs. No LE edema b/l. Radial pulses 2+ bilaterally.  Pulmonary: CTA b/l.  Coarseness and end expiratory wheezing with deep coughing centrally.  Good effort.  Integumentary: No rashes or wounds on face or upper extremities.  Lymphatic: No anterior cervical lymphadenopathy.  Endocrine: No thyromegaly or palpable thyroid nodules.  Psychiatric: Normal affect. Normal thought content.     Assessment & Plan   Assessment and Plan  Pleasant 63-year-old male with mild cognitive impairment, ADHD, hyperlipidemia, ARSH on CPAP, seasonal allergic rhinitis, who presents with the following:     Diagnoses and all orders for this visit:    1. Acute bronchitis with symptoms > 10 days (Primary): Discussed risks and benefits of treatment.  Discussed reasons to return for further evaluation.  -     azithromycin (Zithromax) 250 MG tablet; Take 2 tablets the first day, then 1 tablet daily for 4 days.  Dispense: 6 tablet; Refill: 0    2. Mild cognitive impairment: I have noticed improved recall, word finding, and stuttering today.  His mood is also doing much better.  Happy " to see this improvement, continue to follow closely with neurology.  Agree with continuing CPAP.  -     donepezil (ARICEPT) 10 MG tablet; Take 1 tablet by mouth Every Night.  Dispense: 90 tablet; Refill: 3    3. Prediabetes  -     Hemoglobin A1c    4. Hypercholesteremia  -     CBC & Differential  -     Comprehensive Metabolic Panel  -     Lipid Panel  -     Thyroid Panel With TSH    5. Screening for malignant neoplasm of prostate  -     PSA Screen    6. Screening for blood or protein in urine  -     Urinalysis With Microscopic - Urine, Clean Catch    7. Need for COVID-19 vaccine  -     COVID-19 F23 (Pfizer) 12yrs+ (COMIRNATY)    8. Need for immunization against influenza  -     Fluzone >6 Months ([id number])    Darren Pérez MD  Family Medicine  O: [phone]    Disclaimer: Parts of this note were dictated by speech recognition. Minor errors in transcription may be present. Please call if questions.

## 2023-12-13 LAB
ALBUMIN SERPL-MCNC: 4.5 G/DL (ref 3.9–4.9)
ALBUMIN/GLOB SERPL: 1.6 {RATIO} (ref 1.2–2.2)
ALP SERPL-CCNC: 97 IU/L (ref 44–121)
ALT SERPL-CCNC: 26 IU/L (ref 0–44)
APPEARANCE UR: CLEAR
AST SERPL-CCNC: 24 IU/L (ref 0–40)
BACTERIA #/AREA URNS HPF: NORMAL /[HPF]
BASOPHILS # BLD AUTO: 0.1 X10E3/UL (ref 0–0.2)
BASOPHILS NFR BLD AUTO: 1 %
BILIRUB SERPL-MCNC: 0.5 MG/DL (ref 0–1.2)
BILIRUB UR QL STRIP: NEGATIVE
BUN SERPL-MCNC: 15 MG/DL (ref 8–27)
BUN/CREAT SERPL: 17 (ref 10–24)
CALCIUM SERPL-MCNC: 9.5 MG/DL (ref 8.6–10.2)
CASTS URNS QL MICRO: NORMAL /LPF
CHLORIDE SERPL-SCNC: 99 MMOL/L (ref 96–106)
CHOLEST SERPL-MCNC: 174 MG/DL (ref 100–199)
CO2 SERPL-SCNC: 25 MMOL/L (ref 20–29)
COLOR UR: YELLOW
CREAT SERPL-MCNC: 0.9 MG/DL (ref 0.76–1.27)
EGFRCR SERPLBLD CKD-EPI 2021: 96 ML/MIN/1.73
EOSINOPHIL # BLD AUTO: 0.2 X10E3/UL (ref 0–0.4)
EOSINOPHIL NFR BLD AUTO: 2 %
EPI CELLS #/AREA URNS HPF: NORMAL /HPF (ref 0–10)
ERYTHROCYTE [DISTWIDTH] IN BLOOD BY AUTOMATED COUNT: 12.7 % (ref 11.6–15.4)
FT4I SERPL CALC-MCNC: 1.8 (ref 1.2–4.9)
GLOBULIN SER CALC-MCNC: 2.8 G/DL (ref 1.5–4.5)
GLUCOSE SERPL-MCNC: 109 MG/DL (ref 70–99)
GLUCOSE UR QL STRIP: NEGATIVE
HBA1C MFR BLD: 5.8 % (ref 4.8–5.6)
HCT VFR BLD AUTO: 41.1 % (ref 37.5–51)
HDLC SERPL-MCNC: 42 MG/DL
HGB BLD-MCNC: 13.3 G/DL (ref 13–17.7)
HGB UR QL STRIP: NEGATIVE
IMM GRANULOCYTES # BLD AUTO: 0 X10E3/UL (ref 0–0.1)
IMM GRANULOCYTES NFR BLD AUTO: 0 %
KETONES UR QL STRIP: NEGATIVE
LDLC SERPL CALC-MCNC: 98 MG/DL (ref 0–99)
LEUKOCYTE ESTERASE UR QL STRIP: NEGATIVE
LYMPHOCYTES # BLD AUTO: 1.7 X10E3/UL (ref 0.7–3.1)
LYMPHOCYTES NFR BLD AUTO: 23 %
MCH RBC QN AUTO: 28.1 PG (ref 26.6–33)
MCHC RBC AUTO-ENTMCNC: 32.4 G/DL (ref 31.5–35.7)
MCV RBC AUTO: 87 FL (ref 79–97)
MICRO URNS: NORMAL
MICRO URNS: NORMAL
MONOCYTES # BLD AUTO: 0.7 X10E3/UL (ref 0.1–0.9)
MONOCYTES NFR BLD AUTO: 9 %
NEUTROPHILS # BLD AUTO: 4.8 X10E3/UL (ref 1.4–7)
NEUTROPHILS NFR BLD AUTO: 65 %
NITRITE UR QL STRIP: NEGATIVE
PH UR STRIP: 5.5 [PH] (ref 5–7.5)
PLATELET # BLD AUTO: 359 X10E3/UL (ref 150–450)
POTASSIUM SERPL-SCNC: 4.5 MMOL/L (ref 3.5–5.2)
PROT SERPL-MCNC: 7.3 G/DL (ref 6–8.5)
PROT UR QL STRIP: NEGATIVE
PSA SERPL-MCNC: 0.4 NG/ML (ref 0–4)
RBC # BLD AUTO: 4.73 X10E6/UL (ref 4.14–5.8)
RBC #/AREA URNS HPF: NORMAL /HPF (ref 0–2)
SODIUM SERPL-SCNC: 138 MMOL/L (ref 134–144)
SP GR UR STRIP: 1.02 (ref 1–1.03)
T3RU NFR SERPL: 26 % (ref 24–39)
T4 SERPL-MCNC: 6.9 UG/DL (ref 4.5–12)
TRIGL SERPL-MCNC: 199 MG/DL (ref 0–149)
TSH SERPL DL<=0.005 MIU/L-ACNC: 3.08 UIU/ML (ref 0.45–4.5)
UROBILINOGEN UR STRIP-MCNC: 0.2 MG/DL (ref 0.2–1)
VLDLC SERPL CALC-MCNC: 34 MG/DL (ref 5–40)
WBC # BLD AUTO: 7.4 X10E3/UL (ref 3.4–10.8)
WBC #/AREA URNS HPF: NORMAL /HPF (ref 0–5)

## 2023-12-15 NOTE — PROGRESS NOTES
Both your blood sugar and cholesterol have improved significantly since we last checked it. Keep up the good work! I would keep the statin at the current dose for now.    No other concerns from my end

## 2024-01-23 ENCOUNTER — OFFICE VISIT (OUTPATIENT)
Dept: NEUROLOGY | Facility: CLINIC | Age: 64
End: 2024-01-23
Payer: COMMERCIAL

## 2024-01-23 VITALS
RESPIRATION RATE: 20 BRPM | WEIGHT: 203 LBS | SYSTOLIC BLOOD PRESSURE: 118 MMHG | OXYGEN SATURATION: 94 % | DIASTOLIC BLOOD PRESSURE: 78 MMHG | BODY MASS INDEX: 29.06 KG/M2 | HEIGHT: 70 IN | HEART RATE: 68 BPM

## 2024-01-23 DIAGNOSIS — G47.33 OBSTRUCTIVE SLEEP APNEA: ICD-10-CM

## 2024-01-23 DIAGNOSIS — G31.84 MILD COGNITIVE IMPAIRMENT: Primary | ICD-10-CM

## 2024-01-23 PROCEDURE — 99213 OFFICE O/P EST LOW 20 MIN: CPT

## 2024-01-23 NOTE — PROGRESS NOTES
DOS: 2024  NAME: Biju Verduzco   : 1960  PCP: Darren Pérez MD    Chief Complaint   Patient presents with    Mild cognitive impairment      SUBJECTIVE  Neurological Problem:  63 y.o. RHW male with a past medical history of hypercholesterolemia, prediabetes, mild cognitive impairment, ARSH (compliant with CPAP). They are seen in follow up today for MCI, however the problem is new to the examiner. Patient last seen by Dr. Ethan Leary with a summary of the history taken from the previous note with additions/modifications as indicated. He is unaccompanied.    Interval History:   Mr. Verduzco initially presented in 2022 for the evaluation of memory loss.  He was previously seen by Dr. Leary, last visit 2023.  Per chart review he started noting some forgetfulness several years prior to presentation.  It started with having a significant amount of word finding difficulty; he would get stuck in conversation and was unable to continue, would forget what he was trying to say or would know the word he was trying to say but could not find the word.  He also started forgetting conversations and had difficulties remembering people that he had previously met as well as becoming confused while driving.  He reported a family history of memory loss in his mother who had dementia and his paternal grandmother.  He was previously evaluated by neuropsych in 2019 and was diagnosed with amnestic mild cognitive impairment.  He was taking memantine 10 mg daily and Dr. Leary increase this dose to 10 mg twice daily.  He was also started on a donepezil titration at this time.  A MRI brain w/wo completed in 2022 was negative for any acute process however minimal small vessel disease of the white matter was noted. 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  He was evaluated at   [facility] in December 2022 for dizziness and possible stroke and was diagnosed with vertigo.    He presents today and reports that he is feeling much better.  He is taking donepezil 10 mg daily and memantine 10 mg twice daily without issue.  He asks about taking his Effexor, if it will interfere with his cognitive medications.  His PCP prescribed it but he says he is no longer taking it.  He does report some irritability at times especially at work.  He denies any focal or unilateral symptoms such as visual or speech deficit, facial droop, weakness or numbness to one side of the body, dizziness or headache.  He is still continuing to drive, does not report any  tickets or accidents and has not gotten lost.  He denies any hallucinations.  He has not experienced any falls or gait changes.  He has not left the water running or the stove/oven on.  He still manages his ADLs independently as well as his medications.  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  He mentions that he was diagnosed with sleep apnea since we saw him last and has started using his CPAP, is compliant with it.    Review of Systems   Constitutional: Negative.    Neurological: Negative.         The following portions of the patient's history were reviewed and updated as appropriate: allergies, current medications, past family history, past medical history, past social history, past surgical history and problem list.    Current Medications:   Current Outpatient Medications:     memantine (NAMENDA) 10 MG tablet, TAKE 1 TABLET BY MOUTH EVERY DAY (Patient taking differently: Take 1 tablet by mouth 2 (Two) Times a Day.), Disp: 90 tablet, Rfl: 3    ASPIRIN 81 PO, Take 1 tablet by mouth Daily., Disp: , Rfl:     atorvastatin (LIPITOR) 10 MG tablet, TAKE 1 TABLET BY MOUTH ONCE DAILY FOR CHOLESTEROL AND HEART HEALTH, Disp: 90 tablet, Rfl: 3    Cholecalciferol (VITAMIN D PO), Take 1 tablet by mouth Daily., Disp:  ", Rfl:     donepezil (ARICEPT) 10 MG tablet, Take 1 tablet by mouth Every Night., Disp: 90 tablet, Rfl: 3    meclizine (ANTIVERT) 25 MG tablet, Take 1 tablet by mouth 3 (Three) Times a Day As Needed for Dizziness., Disp: 30 tablet, Rfl: 2    Multiple Vitamins-Minerals (MULTIVITAMIN ADULT PO), Take 1 tablet by mouth Daily., Disp: , Rfl:     Multiple Vitamins-Minerals (ZINC PO), Take 1 tablet by mouth Daily., Disp: , Rfl:     sildenafil (Viagra) 50 MG tablet, Take 1 tablet by mouth Daily As Needed for Erectile Dysfunction., Disp: 30 tablet, Rfl: 3    Thiamine HCl (VITAMIN B-1 PO), Take 1 tablet by mouth Daily., Disp: , Rfl:     venlafaxine XR (EFFEXOR-XR) 37.5 MG 24 hr capsule, Take 1 tab PO QD for mood and concentration (Patient not taking: Reported on 1/23/2024), Disp: 90 capsule, Rfl: 3    vitamin C (ASCORBIC ACID) 250 MG tablet, Take 1 tablet by mouth Daily., Disp: , Rfl:     vitamin E 200 UNIT capsule, Take 1 capsule by mouth Daily., Disp: , Rfl:   **I did not stop or change the above medications.  Patient's medication list was updated to reflect medications they have reported as currently taking, including medication changes made by other providers.    Objective   Vital Signs:  /78   Pulse 68   Resp 20   Ht 177.8 cm (70\")   Wt 92.1 kg (203 lb)   SpO2 94%   BMI 29.13 kg/m²   Body mass index is 29.13 kg/m².    Physical Exam   Physical Exam:  GENERAL: NAD, alert  HEENT: Normocephalic, atraumatic   Resp: Even and unlabored  Psychiatric: Normal mood and affect    Neurological:   MS: AOx3, recent/remote memory intact, normal attention/concentration, language intact, no neglect  CN: visual acuity grossly normal, PERRL, EOMI, no nystagmus, no facial droop, no dysarthria, hearing symmetric, tongue midline, bilateral shoulder shrug symmetric  Motor: Normal tone. No tremor or abnormal movements noted.   Shoulder abductors 5/5  Elbow flexors 5/5  Elbow extensors 5/5   Left  2+  Right  2+  Hip flexors " 5/5  Knee extensors 5/5  Hamstring strength 5/5  Dorsiflexors 5/5  Plantar flexors 5/5  Sensory: Intact to light touch in all four ext.  Coordination: No dysmetria finger to nose   Rapid alternating movements: Normal finger to thumb tap  Gait and station: Normal gait and station    Result Review :  The following data was reviewed by: ERIKA Awad on 01/23/2024:  Laboratory Results:         Lab Results   Component Value Date    WBC 7.4 12/12/2023    HGB 13.3 12/12/2023    HCT 41.1 12/12/2023    MCV 87 12/12/2023     12/12/2023     Lab Results   Component Value Date    GLUCOSE 109 (H) 12/12/2023    BUN 15 12/12/2023    CREATININE 0.90 12/12/2023    EGFRIFNONA 95 10/05/2021    EGFRIFAFRI 110 10/05/2021    BCR 17 12/12/2023    K 4.5 12/12/2023    CO2 25 12/12/2023    CALCIUM 9.5 12/12/2023    PROTENTOTREF 7.3 12/12/2023    ALBUMIN 4.5 12/12/2023    LABIL2 1.6 12/12/2023    AST 24 12/12/2023    ALT 26 12/12/2023     Lab Results   Component Value Date    HGBA1C 5.8 (H) 12/12/2023     Lab Results   Component Value Date    CHOL 231 (H) 03/06/2019    CHOL 213 (H) 12/14/2016     Lab Results   Component Value Date    HDL 42 12/12/2023    HDL 34 (L) 10/07/2022    HDL 40 10/05/2021     Lab Results   Component Value Date    LDL 98 12/12/2023     (H) 10/07/2022     (H) 10/05/2021     Lab Results   Component Value Date    TRIG 199 (H) 12/12/2023    TRIG 324 (H) 10/07/2022    TRIG 198 (H) 10/05/2021     Lab Results   Component Value Date    RPR Reactive (A) 12/28/2020     Lab Results   Component Value Date    TSH 3.080 12/12/2023     Lab Results   Component Value Date    GIBSTWQX96 1,669 (H) 10/07/2022       Data reviewed : Radiologic studies   and Neuropsych evaluation         Assessment and Plan   Diagnoses and all orders for this visit:    1. Mild cognitive impairment (Primary)    2. Obstructive sleep apnea      Mr. Verduzco continues to do well with his doses of donepezil 10 mg nightly and memantine  10 mg twice a day.  We did discuss medication cost today and I encouraged him to utilize [facility] to see if he can find a cheaper pharmacy and we can send his prescriptions there.  We discussed the 4 pillars of good cognitive health: Mental stimulation, socialization, healthy diet and regular physical activity.    We will see Biju back in 6 months, sooner if symptoms warrant.     ERIKA Awad  [facility]   01/23/24       I spent 20 minutes caring for Biju on this date of service. This time includes time spent by me in the following activities:preparing for the visit, reviewing tests, obtaining and/or reviewing a separately obtained history, performing a medically appropriate examination and/or evaluation , counseling and educating the patient/family/caregiver, documenting information in the medical record, independently interpreting results and communicating that information with the patient/family/caregiver, and care coordination  Follow Up   Return in about 6 months (around 7/23/2024).  Patient was given instructions and counseling regarding his condition or for health maintenance advice. Please see specific information pulled into the AVS if appropriate.       Dictated using Dragon Dictation

## 2024-01-23 NOTE — PATIENT INSTRUCTIONS
Recommended Lifestyle Modifications:   -Regular physical activity (ie Silver Sneakers programs)  -Regular social activity (ie clubs, Taoist groups, etc)  -Regular mental stimulation (ie puzzles, cross-words, crafts, etc)  -Healthy diet (ie Mediterranean or DASH diet)

## 2024-02-09 DIAGNOSIS — R45.4 IRRITABILITY AND ANGER: ICD-10-CM

## 2024-02-09 DIAGNOSIS — F90.9 ATTENTION DEFICIT HYPERACTIVITY DISORDER (ADHD), UNSPECIFIED ADHD TYPE: ICD-10-CM

## 2024-02-09 RX ORDER — VENLAFAXINE HYDROCHLORIDE 37.5 MG/1
CAPSULE, EXTENDED RELEASE ORAL
Qty: 90 CAPSULE | Refills: 3 | Status: SHIPPED | OUTPATIENT
Start: 2024-02-09

## 2024-07-16 ENCOUNTER — OFFICE VISIT (OUTPATIENT)
Dept: NEUROLOGY | Facility: CLINIC | Age: 64
End: 2024-07-16
Payer: COMMERCIAL

## 2024-07-16 VITALS
OXYGEN SATURATION: 95 % | WEIGHT: 203 LBS | HEART RATE: 70 BPM | SYSTOLIC BLOOD PRESSURE: 130 MMHG | DIASTOLIC BLOOD PRESSURE: 74 MMHG | RESPIRATION RATE: 20 BRPM | HEIGHT: 70 IN | BODY MASS INDEX: 29.06 KG/M2

## 2024-07-16 DIAGNOSIS — G31.84 MILD COGNITIVE IMPAIRMENT: Primary | ICD-10-CM

## 2024-07-16 DIAGNOSIS — G47.33 OBSTRUCTIVE SLEEP APNEA: ICD-10-CM

## 2024-07-16 NOTE — PROGRESS NOTES
DOS: 2024  NAME: Biju Verduzco   : 1960  PCP: Darren Pérez MD    Chief Complaint   Patient presents with    Mild cognitive impairment      SUBJECTIVE  Neurological Problem:  64 y.o. RHW male with a past medical history of hypercholesterolemia, prediabetes, mild cognitive impairment, ARSH (compliant with CPAP). They are seen in follow up today for MCI. A summary of the history taken from the previous note with additions/modifications as indicated. He is unaccompanied.    Interval History:   **For detailed interval history see progress note dated 24.    Mr. Verduzco has followed with our clinic since 2022 for mild cognitive impairment, previously seen by Dr. Leary and more recently myself in 2024.  He has been maximally medically managed on donepezil 10 mg daily and memantine 10 mg twice daily.      He presents today in follow-up and reports some cognitive change since his last visit.  He tells me that he finds he is having trouble following and remembering long conversations.  He says he seems to have difficulty with re-telling or summarizing conversations between him and others.  He also gives an example today that he cannot recall the MAs name after meeting her when she introduced herself and he looked at her name tag to commit her name to memory.  Denies having difficulty with reading or identifying words, no trouble with saying what he wants to say.  He has not been told that he is repeating himself with his stories or questions and he has not noticed this himself.  He also says today he feels he is having to use his driving maps apps or so lately.  He denies getting lost, no tickets or accidents.  Neuropsych testing was repeated in 2023 and he was diagnosed with mild cognitive impairment amnestic type.  He does have a family history of Alzheimer's disease in his mother and his paternal grandmother.  He also mentions his oldest sister exhibits sign of cognitive  impairment but has not been formally diagnosed.  Most recent neuroimaging was a brain MRI completed in December 2022 that was negative for any atrophy, no microhemorrhages or small vessel disease noted.  Review of labs December 2023 TSH 3.080, free T46.9, CMP and CBC unremarkable, lipid panel well-controlled; vitamin B12 in October 2022 was 1669.  He tells me that he remains compliant with his CPAP for obstructive sleep apnea.    Review of Systems   Musculoskeletal:  Negative for gait problem.   Neurological:  Negative for dizziness, tremors, seizures, syncope, facial asymmetry, speech difficulty, weakness, light-headedness, numbness and headaches.   Psychiatric/Behavioral:  Positive for decreased concentration. Negative for agitation, behavioral problems, confusion, dysphoric mood, hallucinations, self-injury, sleep disturbance and suicidal ideas. The patient is not nervous/anxious and is not hyperactive.         The following portions of the patient's history were reviewed and updated as appropriate: allergies, current medications, past family history, past medical history, past social history, past surgical history and problem list.    Current Medications:   Current Outpatient Medications:     ASPIRIN 81 PO, Take 1 tablet by mouth Daily., Disp: , Rfl:     atorvastatin (LIPITOR) 10 MG tablet, TAKE 1 TABLET BY MOUTH ONCE DAILY FOR CHOLESTEROL AND HEART HEALTH, Disp: 90 tablet, Rfl: 3    Cholecalciferol (VITAMIN D PO), Take 1 tablet by mouth Daily., Disp: , Rfl:     donepezil (ARICEPT) 10 MG tablet, Take 1 tablet by mouth Every Night., Disp: 90 tablet, Rfl: 3    meclizine (ANTIVERT) 25 MG tablet, Take 1 tablet by mouth 3 (Three) Times a Day As Needed for Dizziness., Disp: 30 tablet, Rfl: 2    memantine (NAMENDA) 10 MG tablet, TAKE 1 TABLET BY MOUTH EVERY DAY (Patient taking differently: Take 1 tablet by mouth 2 (Two) Times a Day.), Disp: 90 tablet, Rfl: 3    Multiple Vitamins-Minerals (MULTIVITAMIN ADULT PO), Take 1  "tablet by mouth Daily., Disp: , Rfl:     Multiple Vitamins-Minerals (ZINC PO), Take 1 tablet by mouth Daily., Disp: , Rfl:     Thiamine HCl (VITAMIN B-1 PO), Take 1 tablet by mouth Daily., Disp: , Rfl:     vitamin C (ASCORBIC ACID) 250 MG tablet, Take 1 tablet by mouth Daily., Disp: , Rfl:     vitamin E 200 UNIT capsule, Take 1 capsule by mouth Daily., Disp: , Rfl:     sildenafil (Viagra) 50 MG tablet, Take 1 tablet by mouth Daily As Needed for Erectile Dysfunction. (Patient not taking: Reported on 7/16/2024), Disp: 30 tablet, Rfl: 3    venlafaxine XR (EFFEXOR-XR) 37.5 MG 24 hr capsule, TAKE 1 CAPSULE ONCE DAILY FOR MOOD AND CONCENTRATION (Patient not taking: Reported on 7/16/2024), Disp: 90 capsule, Rfl: 3  **I did not stop or change the above medications.  Patient's medication list was updated to reflect medications they have reported as currently taking, including medication changes made by other providers.    Objective   Vital Signs:  /74   Pulse 70   Resp 20   Ht 177.8 cm (70\")   Wt 92.1 kg (203 lb)   SpO2 95%   BMI 29.13 kg/m²   Body mass index is 29.13 kg/m².    Physical Exam   Physical Exam:  GENERAL: NAD, alert  HEENT: Normocephalic, atraumatic   Resp: Even and unlabored  Extremities: No edema  Skin: Warm and dry  Psychiatric: Normal mood and affect    Neurological:   MS: AOx3, recent/remote memory intact, normal attention/concentration, language intact, no neglect. Recall 3/3, MMSE 30/30. Normal clock draw. 12 animals named in 1 min. (Scanned in Media tab)  CN: visual acuity grossly normal, PERRL, EOMI, no nystagmus, no facial droop, no dysarthria  Motor: Moves all 4 extremities symmetrically and against gravity.  Normal tone and bulk. No tremor or abnormal movements noted.   Sensory: Intact to crude touch in all four ext.  Gait and station: Normal gait and station    Result Review :  The following data was reviewed by: ERIKA Awad on 07/16/2024:  Laboratory Results:         Lab " Results   Component Value Date    WBC 7.4 12/12/2023    HGB 13.3 12/12/2023    HCT 41.1 12/12/2023    MCV 87 12/12/2023     12/12/2023     Lab Results   Component Value Date    GLUCOSE 109 (H) 12/12/2023    BUN 15 12/12/2023    CREATININE 0.90 12/12/2023    EGFRIFNONA 95 10/05/2021    EGFRIFAFRI 110 10/05/2021    BCR 17 12/12/2023    K 4.5 12/12/2023    CO2 25 12/12/2023    CALCIUM 9.5 12/12/2023    PROTENTOTREF 7.3 12/12/2023    ALBUMIN 4.5 12/12/2023    LABIL2 1.6 12/12/2023    AST 24 12/12/2023    ALT 26 12/12/2023     Lab Results   Component Value Date    HGBA1C 5.8 (H) 12/12/2023     Lab Results   Component Value Date    CHOL 231 (H) 03/06/2019    CHOL 213 (H) 12/14/2016     Lab Results   Component Value Date    HDL 42 12/12/2023    HDL 34 (L) 10/07/2022    HDL 40 10/05/2021     Lab Results   Component Value Date    LDL 98 12/12/2023     (H) 10/07/2022     (H) 10/05/2021     Lab Results   Component Value Date    TRIG 199 (H) 12/12/2023    TRIG 324 (H) 10/07/2022    TRIG 198 (H) 10/05/2021     Lab Results   Component Value Date    RPR Reactive (A) 12/28/2020     Lab Results   Component Value Date    TSH 3.080 12/12/2023     Lab Results   Component Value Date    XWMVGUXR57 1,669 (H) 10/07/2022       Data reviewed : Radiologic studies   and Neuropsych eval         Assessment and Plan   Diagnoses and all orders for this visit:    1. Mild cognitive impairment (Primary)  -     Ambulatory Referral to Neuropsychology  -     Vitamin B12  -     Methylmalonic Acid, Serum  -     RPR Qualitative with Reflex to Quant  -     aPTT; Future  -     Protime-INR; Future  -     TSH; Future  -     T4, free; Future  -     MRI Brain With & Without Contrast; Future  -     CBC & Differential; Future    2. Obstructive sleep apnea      Biju is very interested in staying ahead of the curve for his cognitive impairment.  Given his family history of Alzheimer's disease I think he would be a good candidate for Leqembi.  We  had a discussion on this medication today, its indications and requirements to get started as well as monitoring while on the medication.  Previous MRI from 2 years ago shows no evidence of amyloid angiopathy or micro-hemorrhage but we will repeat to re-evaluate now.  Labs have been ordered to rule out preventable causes of memory change.  I have encouraged him to be re-evaluated with Elvis deng who saw him in December 2023.    We will see Biju back after testing is completed, sooner if symptoms warrant.     ERIKA Awad  [facility]   07/16/2024       I spent 30 minutes caring for Biju on this date of service. This time includes time spent by me in the following activities:preparing for the visit, reviewing tests, obtaining and/or reviewing a separately obtained history, performing a medically appropriate examination and/or evaluation , counseling and educating the patient/family/caregiver, ordering medications, tests, or procedures, referring and communicating with other health care professionals , documenting information in the medical record, independently interpreting results and communicating that information with the patient/family/caregiver, and care coordination  Follow Up   No follow-ups on file.  Patient was given instructions and counseling regarding his condition or for health maintenance advice. Please see specific information pulled into the AVS if appropriate.       Dictated using Dragon Dictation

## 2024-07-18 ENCOUNTER — TELEPHONE (OUTPATIENT)
Dept: NEUROLOGY | Facility: CLINIC | Age: 64
End: 2024-07-18
Payer: COMMERCIAL

## 2024-07-18 NOTE — TELEPHONE ENCOUNTER
Provider: KANDY    Caller: LINDA     Relationship to Patient: WIFE    Phone Number: [phone]     Reason for Call: PT'S WIFE CALLED AND IS WANTING TO NOW IF THERE IS ANY INFORMATION THAT CAN BE SENT TO THEM ON Leqembi MEDICATION AS THIS MEDICATION WAS TALKED ABOUT DURING APPT . WIFE WOULD LIKE MORE INFORMATION AS MEDICATION IS NEW.    PLEASE REVIEW AND ADVISE  THANK YOU

## 2024-07-31 ENCOUNTER — LAB (OUTPATIENT)
Facility: HOSPITAL | Age: 64
End: 2024-07-31
Payer: COMMERCIAL

## 2024-07-31 ENCOUNTER — TELEPHONE (OUTPATIENT)
Dept: NEUROLOGY | Facility: CLINIC | Age: 64
End: 2024-07-31
Payer: COMMERCIAL

## 2024-07-31 DIAGNOSIS — G31.84 MILD COGNITIVE IMPAIRMENT: ICD-10-CM

## 2024-07-31 LAB
APTT PPP: 27.4 SECONDS (ref 22.7–35.4)
BASOPHILS # BLD AUTO: 0.06 10*3/MM3 (ref 0–0.2)
BASOPHILS NFR BLD AUTO: 0.8 % (ref 0–1.5)
DEPRECATED RDW RBC AUTO: 40.1 FL (ref 37–54)
EOSINOPHIL # BLD AUTO: 0.22 10*3/MM3 (ref 0–0.4)
EOSINOPHIL NFR BLD AUTO: 2.9 % (ref 0.3–6.2)
ERYTHROCYTE [DISTWIDTH] IN BLOOD BY AUTOMATED COUNT: 13.1 % (ref 12.3–15.4)
HCT VFR BLD AUTO: 43.5 % (ref 37.5–51)
HGB BLD-MCNC: 14.1 G/DL (ref 13–17.7)
IMM GRANULOCYTES # BLD AUTO: 0.02 10*3/MM3 (ref 0–0.05)
IMM GRANULOCYTES NFR BLD AUTO: 0.3 % (ref 0–0.5)
INR PPP: 0.92 (ref 0.9–1.1)
LYMPHOCYTES # BLD AUTO: 1.98 10*3/MM3 (ref 0.7–3.1)
LYMPHOCYTES NFR BLD AUTO: 26.5 % (ref 19.6–45.3)
MCH RBC QN AUTO: 27.8 PG (ref 26.6–33)
MCHC RBC AUTO-ENTMCNC: 32.4 G/DL (ref 31.5–35.7)
MCV RBC AUTO: 85.8 FL (ref 79–97)
MONOCYTES # BLD AUTO: 0.67 10*3/MM3 (ref 0.1–0.9)
MONOCYTES NFR BLD AUTO: 9 % (ref 5–12)
NEUTROPHILS NFR BLD AUTO: 4.51 10*3/MM3 (ref 1.7–7)
NEUTROPHILS NFR BLD AUTO: 60.5 % (ref 42.7–76)
NRBC BLD AUTO-RTO: 0 /100 WBC (ref 0–0.2)
PLATELET # BLD AUTO: 361 10*3/MM3 (ref 140–450)
PMV BLD AUTO: 10.7 FL (ref 6–12)
PROTHROMBIN TIME: 12.6 SECONDS (ref 11.7–14.2)
RBC # BLD AUTO: 5.07 10*6/MM3 (ref 4.14–5.8)
RPR SER QL: NORMAL
T4 FREE SERPL-MCNC: 1.07 NG/DL (ref 0.92–1.68)
TSH SERPL DL<=0.05 MIU/L-ACNC: 2.93 UIU/ML (ref 0.27–4.2)
VIT B12 BLD-MCNC: 1401 PG/ML (ref 211–946)
WBC NRBC COR # BLD AUTO: 7.46 10*3/MM3 (ref 3.4–10.8)

## 2024-07-31 PROCEDURE — 84439 ASSAY OF FREE THYROXINE: CPT

## 2024-07-31 PROCEDURE — 86592 SYPHILIS TEST NON-TREP QUAL: CPT

## 2024-07-31 PROCEDURE — 36415 COLL VENOUS BLD VENIPUNCTURE: CPT

## 2024-07-31 PROCEDURE — 83921 ORGANIC ACID SINGLE QUANT: CPT

## 2024-07-31 PROCEDURE — 85025 COMPLETE CBC W/AUTO DIFF WBC: CPT

## 2024-07-31 PROCEDURE — 85610 PROTHROMBIN TIME: CPT

## 2024-07-31 PROCEDURE — 85730 THROMBOPLASTIN TIME PARTIAL: CPT

## 2024-07-31 PROCEDURE — 82607 VITAMIN B-12: CPT

## 2024-07-31 PROCEDURE — 84443 ASSAY THYROID STIM HORMONE: CPT

## 2024-07-31 NOTE — TELEPHONE ENCOUNTER
Pt came in this morning.  Dropped of CD.  Stated that he was trying to schedule a neuropsyche testing with [facility].  Elvis stated that they had not received the referral.  Printed copy of referral for pt.  Can see that referral was faxed.  [facility] has CD; pt stated that he does NOT need the CD back.  S/w Мария to make sure that Elvis receives fax.

## 2024-08-02 ENCOUNTER — PATIENT MESSAGE (OUTPATIENT)
Dept: NEUROLOGY | Facility: CLINIC | Age: 64
End: 2024-08-02
Payer: COMMERCIAL

## 2024-08-03 LAB
A -- BETA-AMYLOID 42/40 RATIO: 0.09
AL BETA40 PLAS-MCNC: 193.87 PG/ML
AL BETA42 PLAS-MCNC: 17.37 PG/ML
ATN SUMMARY1: ABNORMAL
INFORMATION:: ABNORMAL
N -- NFL, PLASMA: 2.29 PG/ML (ref 0–4.61)
T -- P-TAU181: 0.9 PG/ML (ref 0–0.97)

## 2024-08-05 DIAGNOSIS — G31.84 MILD COGNITIVE IMPAIRMENT: Primary | ICD-10-CM

## 2024-08-05 LAB — METHYLMALONATE SERPL-SCNC: 120 NMOL/L (ref 0–378)

## 2024-08-06 ENCOUNTER — TELEPHONE (OUTPATIENT)
Dept: NEUROLOGY | Facility: CLINIC | Age: 64
End: 2024-08-06
Payer: COMMERCIAL

## 2024-08-06 NOTE — TELEPHONE ENCOUNTER
Caller: Biju Verduzco    Relationship: Self    Best call back number: [phone]    What is the best time to reach you: ANY    Who are you requesting to speak with (clinical staff, provider,  specific staff member): PROVIDER OR ANY NEEDED    Do you know the name of the person who called: TELMA GAITAN    What was the call regarding: MISSED CALL REGARDING SPINAL TAP - ALSO STATES HE HAS NOT HEARD FROM [facility] TO SET UP COGNITIVE STUDY.

## 2024-08-07 DIAGNOSIS — G31.84 MILD COGNITIVE IMPAIRMENT: Primary | ICD-10-CM

## 2024-08-09 NOTE — PROGRESS NOTES
08/20/24 0001   Pre-Procedure Phone Call   Procedure Time Verified Yes   Arrival Time 1200   Procedure Location Verified Yes   Medical History Reviewed No   NPO Status Reinforced Yes   Ride and Caregiver Arranged Yes   Patient Knows to Bring Current Medications No   Bring Outside Films Requested No

## 2024-08-10 DIAGNOSIS — G31.84 MILD COGNITIVE IMPAIRMENT: ICD-10-CM

## 2024-08-12 RX ORDER — MEMANTINE HYDROCHLORIDE 10 MG/1
10 TABLET ORAL 2 TIMES DAILY
Qty: 180 TABLET | Refills: 3 | Status: SHIPPED | OUTPATIENT
Start: 2024-08-12

## 2024-08-20 ENCOUNTER — HOSPITAL ENCOUNTER (OUTPATIENT)
Dept: GENERAL RADIOLOGY | Facility: HOSPITAL | Age: 64
Discharge: HOME | End: 2024-08-20
Payer: COMMERCIAL

## 2024-08-20 VITALS
RESPIRATION RATE: 16 BRPM | WEIGHT: 196 LBS | HEART RATE: 64 BPM | BODY MASS INDEX: 28.06 KG/M2 | TEMPERATURE: 97.8 F | DIASTOLIC BLOOD PRESSURE: 71 MMHG | HEIGHT: 70 IN | OXYGEN SATURATION: 97 % | SYSTOLIC BLOOD PRESSURE: 117 MMHG

## 2024-08-20 DIAGNOSIS — G31.84 MILD COGNITIVE IMPAIRMENT: ICD-10-CM

## 2024-08-20 LAB
APPEARANCE CSF: CLEAR
APPEARANCE CSF: CLEAR
COLOR CSF: COLORLESS
COLOR CSF: COLORLESS
GLUCOSE CSF-MCNC: 65 MG/DL (ref 40–70)
NUC CELL # CSF MANUAL: 2 /MM3 (ref 0–5)
NUC CELL # CSF MANUAL: 3 /MM3 (ref 0–5)
PROT CSF-MCNC: 43.1 MG/DL (ref 15–45)
RBC # CSF MANUAL: 27 /MM3 (ref 0–0)
RBC # CSF MANUAL: 41 /MM3 (ref 0–0)
TUBE # CSF: 1
TUBE # CSF: 3

## 2024-08-20 PROCEDURE — 25010000002 LIDOCAINE 1 % SOLUTION

## 2024-08-20 PROCEDURE — 83520 IMMUNOASSAY QUANT NOS NONAB: CPT

## 2024-08-20 PROCEDURE — 84157 ASSAY OF PROTEIN OTHER: CPT

## 2024-08-20 PROCEDURE — 82945 GLUCOSE OTHER FLUID: CPT

## 2024-08-20 PROCEDURE — 89050 BODY FLUID CELL COUNT: CPT

## 2024-08-20 PROCEDURE — 86592 SYPHILIS TEST NON-TREP QUAL: CPT

## 2024-08-20 RX ORDER — LIDOCAINE HYDROCHLORIDE 10 MG/ML
10 INJECTION, SOLUTION INFILTRATION; PERINEURAL ONCE
Status: COMPLETED | OUTPATIENT
Start: 2024-08-20 | End: 2024-08-20

## 2024-08-20 RX ADMIN — LIDOCAINE HYDROCHLORIDE 4 ML: 10 INJECTION, SOLUTION INFILTRATION; PERINEURAL at 13:50

## 2024-08-20 NOTE — DISCHARGE INSTRUCTIONS
EDUCATION /DISCHARGE INSTRUCTION   A lumbar puncture involves insertion of a sterile needle into the spinal canal by the physician   This procedure is performed for several reasons: to detect increased intracranial pressure, the presence of blood in cerebrospinal fluid (CFS), to obtain CSF specimens for laboratory studies, to administer drugs and to relieve pressure by removing CSF.    You will lie on your stomach with a pillow under your stomach.  This opens the vertebral space to allow access to the spinal needle.  The physician will sterilize the area using antiseptic solution and numb the area where the spinal needle will be placed.  If CSF is being removed for specimen, you may be asked to push or beardown to assist with the flow of the CSF. When the procedure is complete, a band aid will be placed over the injection site and you will be taken to the recovery area.    POTENTIAL RISKS OF A LUMBAR PUNCTURE INCLUDE BUT ARE NOT LIMITED TO:  *  Headache    *  Swelling at puncture site  *  Bleeding into the spinal canal *  Temporary difficulty urinating  *  Leakage of CSF   *  Fever  *  Pain caused by nerve irritation    BENEFIT OF PROCEDURE:  This is the only way to obtain spinal fluid or relieve pressure due to increased CSF.  There is no alternative.  THIS EDUCATION INFORMATION WAS REVIEWED PRIOR TO PROCEDURE AND CONSENT. Patient initials__________________Time_________________    FOLLOWING A LUMBAR PUNCTURE:  *  Drink plenty of liquids -  Caffeine is recommended   *  Lie down flat for the next 8 hours.  If you get a headache, lie down flat for 24 hours, continue to force fluids and call your doctor if  the headache persists.  *  Go straight home.  DO NOT DRIVE    CALL YOUR DOCTOR IF YOU HAVE:  *  A severe headache that will not go away  *  Redness, swelling or drainage from the puncture site  *  Neck stiffness, numbness or weakness  *  Any new or severe symptoms    Following the procedure, you should continue to  take all of your medications as directed by your primary physician unless otherwise instructed.  There have been no changes to the medications you provided us (with the following exceptions if applicable):        YOU ARE THE MOST IMPORTANT FACTOR IN YOUR RECOVERY.  IF YOU HAVE QUESTIONS OR CONCERNS PLEASE CALL THE RADIOLOGY NURSES AT [phone]

## 2024-08-21 ENCOUNTER — TELEPHONE (OUTPATIENT)
Dept: INTERVENTIONAL RADIOLOGY/VASCULAR | Facility: HOSPITAL | Age: 64
End: 2024-08-21
Payer: COMMERCIAL

## 2024-08-22 LAB
CYTO UR: NORMAL
LAB AP CASE REPORT: NORMAL
LAB AP SPECIAL STAINS: NORMAL
PATH REPORT.FINAL DX SPEC: NORMAL
PATH REPORT.GROSS SPEC: NORMAL
REAGIN AB CSF QL: NON REACTIVE

## 2024-08-28 LAB
AL BETA40 CSF-MCNC: ABNORMAL PG/ML
AL BETA42 CSF-MCNC: 612 PG/ML
AL BETA42/ABETA 40 CSF: 0.06
INTERPRETATION: ABNORMAL
Lab: ABNORMAL

## 2024-08-29 ENCOUNTER — LAB (OUTPATIENT)
Facility: HOSPITAL | Age: 64
End: 2024-08-29
Payer: COMMERCIAL

## 2024-08-29 DIAGNOSIS — G31.84 MILD COGNITIVE IMPAIRMENT: ICD-10-CM

## 2024-08-29 PROCEDURE — 36415 COLL VENOUS BLD VENIPUNCTURE: CPT

## 2024-09-06 LAB
APOE GENE MUT ANL BLD/T: NORMAL
LABORATORY COMMENT REPORT: NORMAL
NARRATIVE DIAGNOSTIC REPORT-IMP: NORMAL
REF LAB TEST METHOD: NORMAL

## 2024-09-26 ENCOUNTER — OFFICE VISIT (OUTPATIENT)
Dept: NEUROLOGY | Facility: CLINIC | Age: 64
End: 2024-09-26
Payer: COMMERCIAL

## 2024-09-26 VITALS
SYSTOLIC BLOOD PRESSURE: 128 MMHG | RESPIRATION RATE: 20 BRPM | OXYGEN SATURATION: 97 % | HEART RATE: 68 BPM | BODY MASS INDEX: 28.92 KG/M2 | WEIGHT: 202 LBS | DIASTOLIC BLOOD PRESSURE: 82 MMHG | HEIGHT: 70 IN

## 2024-09-26 DIAGNOSIS — G30.9 ALZHEIMER'S DISEASE: ICD-10-CM

## 2024-09-26 DIAGNOSIS — G31.84 AMNESTIC MCI (MILD COGNITIVE IMPAIRMENT WITH MEMORY LOSS): Primary | ICD-10-CM

## 2024-09-26 DIAGNOSIS — F02.80 ALZHEIMER'S DISEASE: ICD-10-CM

## 2024-09-26 DIAGNOSIS — Z82.0 FAMILY HISTORY OF ALZHEIMER'S DISEASE: ICD-10-CM

## 2024-09-26 RX ORDER — METHYLPREDNISOLONE SODIUM SUCCINATE 125 MG/2ML
125 INJECTION, POWDER, LYOPHILIZED, FOR SOLUTION INTRAMUSCULAR; INTRAVENOUS ONCE
OUTPATIENT
Start: 2024-09-26

## 2024-09-26 RX ORDER — DIPHENHYDRAMINE HYDROCHLORIDE 50 MG/ML
50 INJECTION INTRAMUSCULAR; INTRAVENOUS AS NEEDED
OUTPATIENT
Start: 2024-09-26

## 2024-09-26 RX ORDER — DONEPEZIL HYDROCHLORIDE 10 MG/1
10 TABLET, FILM COATED ORAL NIGHTLY
Qty: 90 TABLET | Refills: 1 | Status: SHIPPED | OUTPATIENT
Start: 2024-09-26

## 2024-09-26 RX ORDER — ACETAMINOPHEN 325 MG/1
650 TABLET ORAL ONCE
OUTPATIENT
Start: 2024-09-26

## 2024-09-26 RX ORDER — SODIUM CHLORIDE 9 MG/ML
20 INJECTION, SOLUTION INTRAVENOUS ONCE
OUTPATIENT
Start: 2024-09-26

## 2024-09-26 RX ORDER — FAMOTIDINE 10 MG/ML
20 INJECTION, SOLUTION INTRAVENOUS AS NEEDED
OUTPATIENT
Start: 2024-09-26

## 2024-10-02 ENCOUNTER — DOCUMENTATION (OUTPATIENT)
Dept: NEUROLOGY | Facility: CLINIC | Age: 64
End: 2024-10-02
Payer: COMMERCIAL

## 2024-10-02 NOTE — PROGRESS NOTES
Start forms for Leqembi have been faxed over and a confirmation of receipt has been faxed back.    10/2/24: Benefits investigation report came back and does need PA. Referral coordinator notified and document given.

## 2024-10-09 ENCOUNTER — TELEPHONE (OUTPATIENT)
Dept: NEUROLOGY | Facility: CLINIC | Age: 64
End: 2024-10-09

## 2024-10-09 NOTE — TELEPHONE ENCOUNTER
Caller: ELISABETH     Relationship: [employer]     Best call back number: [phone]    What was the call regarding [employer] FAX AND [employer] PAY CARD    SENT FAX OCT 01 2024 NEED TO KNOW IF YOU HAVE RECEIVED ?   PT HAS BEEN APPROVED FOR CO PAY CARD BUT THE P.A HAS TO BE COMPLETED AND FAXED BACK     SHE WILL FAX  ANOTHER ONE TODAY [phone] AGAIN TODAY THEY NEED BACK ASAP     Is it okay if the provider responds through [employer]: CALL IF QUESTIONS     PLEASE ADVISE.

## 2024-10-09 NOTE — TELEPHONE ENCOUNTER
Discussed with Мария ALCANTARA and she will reach out to the proper people and will keep me updated on next steps.

## 2024-10-16 ENCOUNTER — OFFICE VISIT (OUTPATIENT)
Dept: NEUROLOGY | Facility: CLINIC | Age: 64
End: 2024-10-16
Payer: COMMERCIAL

## 2024-10-16 DIAGNOSIS — F02.80 ALZHEIMER'S DISEASE: ICD-10-CM

## 2024-10-16 DIAGNOSIS — G30.9 ALZHEIMER'S DISEASE: ICD-10-CM

## 2024-10-16 DIAGNOSIS — Z82.0 FAMILY HISTORY OF ALZHEIMER'S DISEASE: ICD-10-CM

## 2024-10-16 DIAGNOSIS — G31.84 MILD COGNITIVE IMPAIRMENT: ICD-10-CM

## 2024-10-16 DIAGNOSIS — G31.84 AMNESTIC MCI (MILD COGNITIVE IMPAIRMENT WITH MEMORY LOSS): Primary | ICD-10-CM

## 2024-10-16 NOTE — PROGRESS NOTES
DOS: 10/16/2024  NAME: Biju Verduzco   : 1960  PCP: Darren Pérez MD    No chief complaint on file.     SUBJECTIVE  Neurological Problem:  64 y.o. *** male with a past medical history of ***. They are seen in follow up today for ***, however the problem is new to the examiner. Patient last seen by *** in ***, with a summary of the history taken from the previous note with additions/modifications as indicated. ***He/She is accompanied/unaccompanied.      Interval History:       Review of Systems   Neurological:  Negative for dizziness, tremors, seizures, syncope, facial asymmetry, speech difficulty, weakness, light-headedness, numbness and headaches.   Psychiatric/Behavioral:  Negative for agitation, behavioral problems, confusion, decreased concentration, dysphoric mood, hallucinations, self-injury, sleep disturbance and suicidal ideas. The patient is not nervous/anxious and is not hyperactive.       The following portions of the patient's history were reviewed and updated as appropriate: allergies, current medications, past family history, past medical history, past social history, past surgical history and problem list.    Current Medications:   Current Outpatient Medications:   •  ASPIRIN 81 PO, Take 1 tablet by mouth Daily., Disp: , Rfl:   •  atorvastatin (LIPITOR) 10 MG tablet, TAKE 1 TABLET BY MOUTH ONCE DAILY FOR CHOLESTEROL AND HEART HEALTH, Disp: 90 tablet, Rfl: 3  •  Cholecalciferol (VITAMIN D PO), Take 1 tablet by mouth Daily., Disp: , Rfl:   •  donepezil (ARICEPT) 10 MG tablet, Take 1 tablet by mouth Every Night., Disp: 90 tablet, Rfl: 1  •  meclizine (ANTIVERT) 25 MG tablet, Take 1 tablet by mouth 3 (Three) Times a Day As Needed for Dizziness., Disp: 30 tablet, Rfl: 2  •  memantine (NAMENDA) 10 MG tablet, TAKE 1 TABLET BY MOUTH TWICE A DAY, Disp: 180 tablet, Rfl: 3  •  Multiple Vitamins-Minerals (MULTIVITAMIN ADULT PO), Take 1 tablet by mouth Daily., Disp: , Rfl:   •  Multiple Vitamins-Minerals  (ZINC PO), Take 1 tablet by mouth Daily., Disp: , Rfl:   •  sildenafil (Viagra) 50 MG tablet, Take 1 tablet by mouth Daily As Needed for Erectile Dysfunction. (Patient not taking: Reported on 7/16/2024), Disp: 30 tablet, Rfl: 3  •  Thiamine HCl (VITAMIN B-1 PO), Take 1 tablet by mouth Daily., Disp: , Rfl:   •  venlafaxine XR (EFFEXOR-XR) 37.5 MG 24 hr capsule, TAKE 1 CAPSULE ONCE DAILY FOR MOOD AND CONCENTRATION (Patient not taking: Reported on 7/16/2024), Disp: 90 capsule, Rfl: 3  •  vitamin C (ASCORBIC ACID) 250 MG tablet, Take 1 tablet by mouth Daily., Disp: , Rfl:   •  vitamin E 200 UNIT capsule, Take 1 capsule by mouth Daily., Disp: , Rfl:   **I did not stop or change the above medications.  Patient's medication list was updated to reflect medications they have reported as currently taking, including medication changes made by other providers.    Objective   Vital Signs:  There were no vitals taken for this visit.  There is no height or weight on file to calculate BMI.    Physical Exam   Physical Exam:  GENERAL: NAD, alert  HEENT: Normocephalic, atraumatic   COR: RRR  Resp: Even and unlabored  Extremities: No edema  Skin: Warm and dry  Psychiatric: Normal mood and affect    Neurological:   MS: AOx3, recent/remote memory intact, normal attention/concentration, language intact, no neglect. Recall 3/3, MMSE /30. Clock draw. Animals named in 1 min.   CN: visual acuity grossly normal, PERRL, EOMI, no nystagmus, no facial droop, no dysarthria, hearing symmetric, tongue midline, bilateral shoulder shrug symmetric  Motor: Normal tone and bulk. No tremor or abnormal movements noted. No asterixis. No overshoot.    Trapezius elevation: 5/5  Shoulder abductors 5/5  Elbow flexors 5/5  Elbow extensors 5/5   Left  2+  Right  2+  Hip flexors 5/5  Knee extensors 5/5  Hamstring strength 5/5  Dorsiflexors 5/5  Plantar flexors 5/5  Sensory: Intact to crude and light touch in all four ext.  Cold Temperature, Vibration,  Proprioception, Pinprick  Coordination: No dysmetria finger to nose   Rapid alternating movements: Normal finger to thumb tap  Gait and station: Normal gait and station  Heels/Toes walk  Tandem. Romberg    Reflexes   Right brachioradialis: 2+  Left brachioradialis: 2+  Right biceps: 2+  Left biceps: 2+  Right triceps: 2+  Left triceps: 2+  Right patellar: 2+  Left patellar: 2+  Right achilles: 2+  Left achilles: 2+   Plantars down-going, mute, up-going  Fink's     Result Review :{Labs  Result Review  Imaging  Med Tab  Media  Procedures :23}  The following data was reviewed by: Danni Portillo MA on 10/16/2024:  Laboratory Results:         Lab Results   Component Value Date    WBC 7.46 07/31/2024    HGB 14.1 07/31/2024    HCT 43.5 07/31/2024    MCV 85.8 07/31/2024     07/31/2024     Lab Results   Component Value Date    GLUCOSE 109 (H) 12/12/2023    BUN 15 12/12/2023    CREATININE 0.90 12/12/2023    EGFRIFNONA 95 10/05/2021    EGFRIFAFRI 110 10/05/2021    BCR 17 12/12/2023    K 4.5 12/12/2023    CO2 25 12/12/2023    CALCIUM 9.5 12/12/2023    PROTENTOTREF 7.3 12/12/2023    ALBUMIN 4.5 12/12/2023    LABIL2 1.6 12/12/2023    AST 24 12/12/2023    ALT 26 12/12/2023     Lab Results   Component Value Date    HGBA1C 5.8 (H) 12/12/2023     Lab Results   Component Value Date    CHOL 231 (H) 03/06/2019    CHOL 213 (H) 12/14/2016     Lab Results   Component Value Date    HDL 42 12/12/2023    HDL 34 (L) 10/07/2022    HDL 40 10/05/2021     Lab Results   Component Value Date    LDL 98 12/12/2023     (H) 10/07/2022     (H) 10/05/2021     Lab Results   Component Value Date    TRIG 199 (H) 12/12/2023    TRIG 324 (H) 10/07/2022    TRIG 198 (H) 10/05/2021     Lab Results   Component Value Date    RPR Non-Reactive 07/31/2024     Lab Results   Component Value Date    TSH 2.930 07/31/2024     Lab Results   Component Value Date    FKBGAGLB08 1,401 (H) 07/31/2024       {Data reviewed (Optional):11305:::1}              Assessment and Plan {CC Problem List  Visit Diagnosis   ROS  Review (Popup)  Health Maintenance  Quality  BestPractice  Medications  SmartSets  SnapShot Encounters  Media :23}  There are no diagnoses linked to this encounter.    Encouraged regular physical activity, healthy diet (e.g. Mediterranean).   Secondary stroke prevention: Ideal targets for stroke prevention would be Blood pressure < 130/80; B12 > 500, TSH in normal range, LDL < 70, HbA1c < 6.5 and smoking cessation if applicable.    Call 911 for stroke symptoms.    We will see *** back in *** months, sooner if symptoms warrant.     Danni Portillo MA  [facility]   10/16/24       {Time Spent (Optional):70210}  Follow Up {Instructions Charge Capture  Follow-up Communications :23}  No follow-ups on file.  Patient was given instructions and counseling regarding his condition or for health maintenance advice. Please see specific information pulled into the AVS if appropriate.       Dictated using Dragon Dictation

## 2024-10-16 NOTE — PROGRESS NOTES
Subjective   Biju Verduzco is a 64 y.o. male. Here for follow-up to complete the clinical dementia rating (CDR) prior to Leqembi start.     History of Present Illness  **For detailed interval history see progress note dated 1/23/24.     Mr. Verduzco has followed with our clinic since October 2022 for mild cognitive impairment, amnestic type, previously seen by Dr. Leary and more recently myself in July 2024.  He has been maximally medically managed on donepezil 10 mg daily and memantine 10 mg twice daily.  He wants to be proactive in treating his cognitive impairment and did not want to progress to Alzheimer's disease. He has a strong family history of Alzheimer's disease as previously discussed in past visits. We discussed treatment with Leqembi at length and he wants to move forward with the workup. Here is here today for CDR evaluation.     The following portions of the patient's history were reviewed and updated as appropriate: He  has a past medical history of ADD (attention deficit disorder), Anxiety, Atypical chest pain (03/06/2019), Borderline systolic HTN (12/10/2019), Depression, ED (erectile dysfunction), Epicondylitis elbow, medial, left (06/04/2019), Erectile dysfunction, Hypercholesteremia, ARSH (obstructive sleep apnea) (07/16/2006), and Skin lesion of scalp (03/23/2020).  He does not have any pertinent problems on file.  He  has a past surgical history that includes Cardiovascular stress test (2013).  His family history includes Heart disease in his father; Hypothyroidism in his mother.  He  reports that he has never smoked. He has never been exposed to tobacco smoke. He has never used smokeless tobacco. He reports current alcohol use of about 4.0 standard drinks of alcohol per week. He reports that he does not use drugs.  Current Outpatient Medications   Medication Sig Dispense Refill    ASPIRIN 81 PO Take 1 tablet by mouth Daily.      atorvastatin (LIPITOR) 10 MG tablet TAKE 1 TABLET BY MOUTH ONCE  DAILY FOR CHOLESTEROL AND HEART HEALTH 90 tablet 3    Cholecalciferol (VITAMIN D PO) Take 1 tablet by mouth Daily.      donepezil (ARICEPT) 10 MG tablet Take 1 tablet by mouth Every Night. 90 tablet 1    meclizine (ANTIVERT) 25 MG tablet Take 1 tablet by mouth 3 (Three) Times a Day As Needed for Dizziness. 30 tablet 2    memantine (NAMENDA) 10 MG tablet TAKE 1 TABLET BY MOUTH TWICE A  tablet 3    Multiple Vitamins-Minerals (MULTIVITAMIN ADULT PO) Take 1 tablet by mouth Daily.      Multiple Vitamins-Minerals (ZINC PO) Take 1 tablet by mouth Daily.      sildenafil (Viagra) 50 MG tablet Take 1 tablet by mouth Daily As Needed for Erectile Dysfunction. (Patient not taking: Reported on 7/16/2024) 30 tablet 3    Thiamine HCl (VITAMIN B-1 PO) Take 1 tablet by mouth Daily.      venlafaxine XR (EFFEXOR-XR) 37.5 MG 24 hr capsule TAKE 1 CAPSULE ONCE DAILY FOR MOOD AND CONCENTRATION (Patient not taking: Reported on 7/16/2024) 90 capsule 3    vitamin C (ASCORBIC ACID) 250 MG tablet Take 1 tablet by mouth Daily.      vitamin E 200 UNIT capsule Take 1 capsule by mouth Daily.       No current facility-administered medications for this visit.     He is allergic to wellbutrin [bupropion]..    Review of Systems   Psychiatric/Behavioral:  Positive for agitation, confusion, decreased concentration and sleep disturbance.      Objective   Physical Exam  Constitutional:       Appearance: Normal appearance. He is normal weight.   HENT:      Head: Normocephalic and atraumatic.      Right Ear: External ear normal.      Left Ear: External ear normal.      Nose: Nose normal.   Eyes:      Extraocular Movements: Extraocular movements intact.      Pupils: Pupils are equal, round, and reactive to light.   Pulmonary:      Effort: Pulmonary effort is normal.      Breath sounds: Normal breath sounds.   Musculoskeletal:         General: Normal range of motion.   Skin:     General: Skin is warm and dry.   Neurological:      General: No focal  deficit present.      Mental Status: He is alert and oriented to person, place, and time. Mental status is at baseline.      Cranial Nerves: No cranial nerve deficit.      Gait: Gait normal.       Assessment & Plan   Diagnoses and all orders for this visit:    1. Amnestic MCI (mild cognitive impairment with memory loss) (Primary)    2. Alzheimer's disease    3. Family history of Alzheimer's disease    4. Mild cognitive impairment       Plan: CDR box score of 1. Submit CDR box score to patient insurance per their request. Leqembi start after clinical decision making by insurance company.     Hayde Owusu, ERIKA  10/16/2024  14:10 EDT      Greater than 50 minutes has been spent in the chart review, assessment, medical decision making, and coordination of care of this patient.

## 2024-10-18 ENCOUNTER — TELEPHONE (OUTPATIENT)
Dept: NEUROLOGY | Facility: CLINIC | Age: 64
End: 2024-10-18

## 2024-10-18 NOTE — TELEPHONE ENCOUNTER
Provider: TELMA GAITAN    Caller: FIDELIA WITH [employer]    Phone Number: [phone]    What was the call regarding: REQUESTING TO GET COPY OF LEQEMBI AUTH FAXED OVER. FAX# [phone]    ALSO ADVISED PATIENT WAS APPROVED FOR [employer] CARD. SHOULD BE GETTING WELCOME LETTER FROM [employer] TRIAL [employer]ARD. EFFECTIVE 10/1/24 CARD# [id number] GROUP# [id number]

## 2024-10-18 NOTE — TELEPHONE ENCOUNTER
Faxed auth to fax number provided   [Rad] : radial 2+ and symmetric bilaterally [Normal] : Alert and in no acute distress [Poor Appearance] : well-appearing [Acute Distress] : not in acute distress [Obese] : not obese [de-identified] : The patient has no respiratory distress. Mood and affect are normal. The patient is alert and oriented to person, place and time.\par Examination of the cervical spine demonstrates no tenderness, no deformity and no muscle spasm. Cervical spine rotation is 60° to the right, 60° to the left, 75° of extension and 45° of flexion. Neurologic exam of the upper extremities reveals intact sensation to light touch. Motor function is 5 over 5 in all groups. Deep tendon reflexes are 2+ and equal at the biceps, triceps and brachioradialis.\par Examination of the right shoulder demonstrates no deformity. The skin is intact. There is no erythema. There is tenderness anteriorly. Impingement sign is positive There is no instability. Drop arm test is negative. Empty can test is negative. Liftoff test is negative. Merrick test is negative. \par He has elevation of 140 compared to 150 on the left.  He has external rotation of 45 degrees compared to 60 on the left.  He has internal rotation to the mid lumbar level compared to lower thoracic level on the left.  The elbows are stable.  There is mild tenderness at the triceps insertion and pain with triceps motion against resistance.  There is no lymphedema.

## 2024-10-22 ENCOUNTER — TELEPHONE (OUTPATIENT)
Dept: NEUROLOGY | Facility: CLINIC | Age: 64
End: 2024-10-22

## 2024-10-22 ENCOUNTER — INFUSION (OUTPATIENT)
Dept: ONCOLOGY | Facility: HOSPITAL | Age: 64
End: 2024-10-22
Payer: COMMERCIAL

## 2024-10-22 VITALS
HEART RATE: 66 BPM | TEMPERATURE: 96.6 F | BODY MASS INDEX: 29.01 KG/M2 | RESPIRATION RATE: 15 BRPM | WEIGHT: 202.6 LBS | SYSTOLIC BLOOD PRESSURE: 127 MMHG | OXYGEN SATURATION: 96 % | DIASTOLIC BLOOD PRESSURE: 74 MMHG | HEIGHT: 70 IN

## 2024-10-22 DIAGNOSIS — F02.80 ALZHEIMER'S DISEASE: Primary | ICD-10-CM

## 2024-10-22 DIAGNOSIS — G30.9 ALZHEIMER'S DISEASE: Primary | ICD-10-CM

## 2024-10-22 PROCEDURE — 96413 CHEMO IV INFUSION 1 HR: CPT

## 2024-10-22 PROCEDURE — 96375 TX/PRO/DX INJ NEW DRUG ADDON: CPT

## 2024-10-22 PROCEDURE — 96365 THER/PROPH/DIAG IV INF INIT: CPT

## 2024-10-22 PROCEDURE — 25810000003 SODIUM CHLORIDE 0.9 % SOLUTION 250 ML FLEX CONT

## 2024-10-22 PROCEDURE — 25810000003 SODIUM CHLORIDE 0.9 % SOLUTION

## 2024-10-22 PROCEDURE — 25010000002 METHYLPREDNISOLONE PER 125 MG

## 2024-10-22 PROCEDURE — 25010000002 LECANEMAB-IRMB 500 MG/5ML SOLUTION 5 ML VIAL

## 2024-10-22 PROCEDURE — 25010000002 DIPHENHYDRAMINE PER 50 MG

## 2024-10-22 RX ORDER — SODIUM CHLORIDE 9 MG/ML
20 INJECTION, SOLUTION INTRAVENOUS ONCE
Status: COMPLETED | OUTPATIENT
Start: 2024-10-22 | End: 2024-10-22

## 2024-10-22 RX ORDER — METHYLPREDNISOLONE SODIUM SUCCINATE 125 MG/2ML
125 INJECTION, POWDER, LYOPHILIZED, FOR SOLUTION INTRAMUSCULAR; INTRAVENOUS ONCE
OUTPATIENT
Start: 2024-11-05

## 2024-10-22 RX ORDER — ACETAMINOPHEN 325 MG/1
650 TABLET ORAL ONCE
Status: COMPLETED | OUTPATIENT
Start: 2024-10-22 | End: 2024-10-22

## 2024-10-22 RX ORDER — ACETAMINOPHEN 325 MG/1
650 TABLET ORAL ONCE
OUTPATIENT
Start: 2024-11-05

## 2024-10-22 RX ORDER — DIPHENHYDRAMINE HYDROCHLORIDE 50 MG/ML
25 INJECTION INTRAMUSCULAR; INTRAVENOUS ONCE
Status: COMPLETED | OUTPATIENT
Start: 2024-10-22 | End: 2024-10-22

## 2024-10-22 RX ORDER — DIPHENHYDRAMINE HYDROCHLORIDE 50 MG/ML
50 INJECTION INTRAMUSCULAR; INTRAVENOUS AS NEEDED
OUTPATIENT
Start: 2024-11-05

## 2024-10-22 RX ORDER — METHYLPREDNISOLONE SODIUM SUCCINATE 125 MG/2ML
125 INJECTION, POWDER, LYOPHILIZED, FOR SOLUTION INTRAMUSCULAR; INTRAVENOUS ONCE
Status: COMPLETED | OUTPATIENT
Start: 2024-10-22 | End: 2024-10-22

## 2024-10-22 RX ORDER — FAMOTIDINE 10 MG/ML
20 INJECTION, SOLUTION INTRAVENOUS AS NEEDED
OUTPATIENT
Start: 2024-11-05

## 2024-10-22 RX ORDER — SODIUM CHLORIDE 9 MG/ML
20 INJECTION, SOLUTION INTRAVENOUS ONCE
OUTPATIENT
Start: 2024-11-05

## 2024-10-22 RX ADMIN — DIPHENHYDRAMINE HYDROCHLORIDE 25 MG: 50 INJECTION INTRAMUSCULAR; INTRAVENOUS at 09:28

## 2024-10-22 RX ADMIN — LECANEMAB 920 MG: 100 INJECTION, SOLUTION INTRAVENOUS at 10:07

## 2024-10-22 RX ADMIN — ACETAMINOPHEN 650 MG: 325 TABLET ORAL at 09:27

## 2024-10-22 RX ADMIN — METHYLPREDNISOLONE SODIUM SUCCINATE 125 MG: 125 INJECTION, POWDER, FOR SOLUTION INTRAMUSCULAR; INTRAVENOUS at 09:31

## 2024-10-22 RX ADMIN — SODIUM CHLORIDE 20 ML/HR: 9 INJECTION, SOLUTION INTRAVENOUS at 09:25

## 2024-10-22 NOTE — TELEPHONE ENCOUNTER
Caller: PRINCE    Relationship: W/ [employer] PATIENT SUPPORT     Best call back number: [phone]    What was the call regarding: PRINCE CALLED TO LET THE OFFICE KNOW THAT PT'S [employer] PRIOR AUTHORIZATION HAS BEEN APPROVED.    Do you require a callback: IF NEEDED.    PLEASE REVIEW AND ADVISE.

## 2024-10-22 NOTE — NURSING NOTE
Pt here for first time leqembi. Informational booklet given to pt with side effects reviewed and aware of 3 hour post monitoring. Pt vu and in agreement to proceed with treatment today.    Pt tolerated treatment without any complications or complaints of HA, dizziness or confusion. Pt instructed to call the prescribing MD office for any concerns or if symptoms develop. Pt vu, VSS and discharged in stable condition.

## 2024-10-23 ENCOUNTER — TELEPHONE (OUTPATIENT)
Dept: NEUROLOGY | Facility: CLINIC | Age: 64
End: 2024-10-23
Payer: COMMERCIAL

## 2024-10-23 NOTE — NURSING NOTE
Spoke with Hayde Owusu regarding Mr Verduzco's infusion schedule and MRI scans. Per Hayde, Pt needs to have a MRI and be reviewed by their office prior to the 5th dose, 7th dose, and 14th dose. Reviewed with pharmacy and verified that this information is in future treatment plans.

## 2024-10-23 NOTE — TELEPHONE ENCOUNTER
PATIENT CALLING TO FIND OUT WHEN HE SHOULD SCHEDULE MRI.    HE STATES HE KNOWS IT IS AFTER SO MANY INFUSIONS BUT HE NEEDS TO KNOW HOW MANY.    PLEASE ADVISE PATIENT

## 2024-10-24 NOTE — TELEPHONE ENCOUNTER
Patient informed that he needs to have his MRI done after his 4th infusion. Suggested he go ahead and call to schedule it. Number to scheduling was given.

## 2024-11-06 ENCOUNTER — INFUSION (OUTPATIENT)
Dept: ONCOLOGY | Facility: HOSPITAL | Age: 64
End: 2024-11-06
Payer: COMMERCIAL

## 2024-11-06 VITALS
DIASTOLIC BLOOD PRESSURE: 79 MMHG | BODY MASS INDEX: 30.92 KG/M2 | HEART RATE: 66 BPM | HEIGHT: 68 IN | WEIGHT: 204 LBS | SYSTOLIC BLOOD PRESSURE: 142 MMHG | RESPIRATION RATE: 15 BRPM | TEMPERATURE: 97.8 F | OXYGEN SATURATION: 97 %

## 2024-11-06 DIAGNOSIS — F02.80 ALZHEIMER'S DISEASE: Primary | ICD-10-CM

## 2024-11-06 DIAGNOSIS — G30.9 ALZHEIMER'S DISEASE: Primary | ICD-10-CM

## 2024-11-06 PROCEDURE — 25010000002 METHYLPREDNISOLONE PER 125 MG

## 2024-11-06 PROCEDURE — 25010000002 DIPHENHYDRAMINE PER 50 MG

## 2024-11-06 PROCEDURE — 25010000002 LECANEMAB-IRMB 500 MG/5ML SOLUTION 5 ML VIAL

## 2024-11-06 PROCEDURE — 96375 TX/PRO/DX INJ NEW DRUG ADDON: CPT

## 2024-11-06 PROCEDURE — 96413 CHEMO IV INFUSION 1 HR: CPT

## 2024-11-06 PROCEDURE — 25810000003 SODIUM CHLORIDE 0.9 % SOLUTION 250 ML FLEX CONT

## 2024-11-06 PROCEDURE — 96365 THER/PROPH/DIAG IV INF INIT: CPT

## 2024-11-06 RX ORDER — FAMOTIDINE 10 MG/ML
20 INJECTION, SOLUTION INTRAVENOUS AS NEEDED
OUTPATIENT
Start: 2024-11-19

## 2024-11-06 RX ORDER — SODIUM CHLORIDE 9 MG/ML
20 INJECTION, SOLUTION INTRAVENOUS ONCE
OUTPATIENT
Start: 2024-11-19

## 2024-11-06 RX ORDER — METHYLPREDNISOLONE SODIUM SUCCINATE 125 MG/2ML
125 INJECTION, POWDER, LYOPHILIZED, FOR SOLUTION INTRAMUSCULAR; INTRAVENOUS ONCE
Status: CANCELLED | OUTPATIENT
Start: 2024-11-19

## 2024-11-06 RX ORDER — DIPHENHYDRAMINE HYDROCHLORIDE 50 MG/ML
50 INJECTION INTRAMUSCULAR; INTRAVENOUS AS NEEDED
OUTPATIENT
Start: 2024-11-19

## 2024-11-06 RX ORDER — ACETAMINOPHEN 325 MG/1
650 TABLET ORAL ONCE
Status: CANCELLED | OUTPATIENT
Start: 2024-11-19

## 2024-11-06 RX ORDER — ACETAMINOPHEN 325 MG/1
650 TABLET ORAL ONCE
Status: COMPLETED | OUTPATIENT
Start: 2024-11-06 | End: 2024-11-06

## 2024-11-06 RX ORDER — DIPHENHYDRAMINE HYDROCHLORIDE 50 MG/ML
25 INJECTION INTRAMUSCULAR; INTRAVENOUS ONCE
Status: COMPLETED | OUTPATIENT
Start: 2024-11-06 | End: 2024-11-06

## 2024-11-06 RX ORDER — METHYLPREDNISOLONE SODIUM SUCCINATE 125 MG/2ML
125 INJECTION, POWDER, LYOPHILIZED, FOR SOLUTION INTRAMUSCULAR; INTRAVENOUS ONCE
Status: COMPLETED | OUTPATIENT
Start: 2024-11-06 | End: 2024-11-06

## 2024-11-06 RX ADMIN — METHYLPREDNISOLONE SODIUM SUCCINATE 125 MG: 125 INJECTION, POWDER, FOR SOLUTION INTRAMUSCULAR; INTRAVENOUS at 10:04

## 2024-11-06 RX ADMIN — DIPHENHYDRAMINE HYDROCHLORIDE 25 MG: 50 INJECTION INTRAMUSCULAR; INTRAVENOUS at 10:02

## 2024-11-06 RX ADMIN — LECANEMAB 930 MG: 100 INJECTION, SOLUTION INTRAVENOUS at 10:34

## 2024-11-06 RX ADMIN — ACETAMINOPHEN 650 MG: 325 TABLET ORAL at 10:06

## 2024-11-10 DIAGNOSIS — E78.00 HYPERCHOLESTEREMIA: ICD-10-CM

## 2024-11-12 RX ORDER — ATORVASTATIN CALCIUM 10 MG/1
TABLET, FILM COATED ORAL
Qty: 90 TABLET | Refills: 0 | Status: SHIPPED | OUTPATIENT
Start: 2024-11-12

## 2024-11-20 ENCOUNTER — INFUSION (OUTPATIENT)
Dept: ONCOLOGY | Facility: HOSPITAL | Age: 64
End: 2024-11-20
Payer: COMMERCIAL

## 2024-11-20 VITALS
TEMPERATURE: 96.8 F | WEIGHT: 203.8 LBS | HEIGHT: 68 IN | OXYGEN SATURATION: 98 % | RESPIRATION RATE: 15 BRPM | DIASTOLIC BLOOD PRESSURE: 82 MMHG | BODY MASS INDEX: 30.89 KG/M2 | SYSTOLIC BLOOD PRESSURE: 131 MMHG | HEART RATE: 51 BPM

## 2024-11-20 DIAGNOSIS — F02.80 ALZHEIMER'S DISEASE: Primary | ICD-10-CM

## 2024-11-20 DIAGNOSIS — G30.9 ALZHEIMER'S DISEASE: Primary | ICD-10-CM

## 2024-11-20 PROCEDURE — 25010000002 LECANEMAB-IRMB 500 MG/5ML SOLUTION 5 ML VIAL

## 2024-11-20 PROCEDURE — 96413 CHEMO IV INFUSION 1 HR: CPT

## 2024-11-20 PROCEDURE — 25810000003 SODIUM CHLORIDE 0.9 % SOLUTION 250 ML FLEX CONT

## 2024-11-20 PROCEDURE — 96365 THER/PROPH/DIAG IV INF INIT: CPT

## 2024-11-20 RX ORDER — SODIUM CHLORIDE 9 MG/ML
20 INJECTION, SOLUTION INTRAVENOUS ONCE
OUTPATIENT
Start: 2024-12-03

## 2024-11-20 RX ORDER — DIPHENHYDRAMINE HYDROCHLORIDE 50 MG/ML
50 INJECTION INTRAMUSCULAR; INTRAVENOUS AS NEEDED
OUTPATIENT
Start: 2024-12-03

## 2024-11-20 RX ORDER — FAMOTIDINE 10 MG/ML
20 INJECTION, SOLUTION INTRAVENOUS AS NEEDED
OUTPATIENT
Start: 2024-12-03

## 2024-11-20 RX ADMIN — LECANEMAB 920 MG: 100 INJECTION, SOLUTION INTRAVENOUS at 09:23

## 2024-11-20 NOTE — NURSING NOTE
Patient here for 3rd dose of Leqembi. No premeds ordered and states has done well with previous infusions; glenn well. Discharged to home in stable condition after 2 hr monitoring post infusion.

## 2024-12-01 DIAGNOSIS — G31.84 MILD COGNITIVE IMPAIRMENT: ICD-10-CM

## 2024-12-02 RX ORDER — MEMANTINE HYDROCHLORIDE 10 MG/1
10 TABLET ORAL DAILY
Qty: 90 TABLET | Refills: 3 | Status: SHIPPED | OUTPATIENT
Start: 2024-12-02

## 2024-12-04 ENCOUNTER — INFUSION (OUTPATIENT)
Dept: ONCOLOGY | Facility: HOSPITAL | Age: 64
End: 2024-12-04
Payer: COMMERCIAL

## 2024-12-04 VITALS
RESPIRATION RATE: 15 BRPM | TEMPERATURE: 98.6 F | HEIGHT: 68 IN | DIASTOLIC BLOOD PRESSURE: 76 MMHG | OXYGEN SATURATION: 96 % | WEIGHT: 207 LBS | HEART RATE: 74 BPM | SYSTOLIC BLOOD PRESSURE: 125 MMHG | BODY MASS INDEX: 31.37 KG/M2

## 2024-12-04 DIAGNOSIS — G30.9 ALZHEIMER'S DISEASE: Primary | ICD-10-CM

## 2024-12-04 DIAGNOSIS — F02.80 ALZHEIMER'S DISEASE: Primary | ICD-10-CM

## 2024-12-04 PROCEDURE — 25810000003 SODIUM CHLORIDE 0.9 % SOLUTION 250 ML FLEX CONT

## 2024-12-04 PROCEDURE — 96365 THER/PROPH/DIAG IV INF INIT: CPT

## 2024-12-04 PROCEDURE — 25010000002 LECANEMAB-IRMB 500 MG/5ML SOLUTION 5 ML VIAL

## 2024-12-04 PROCEDURE — 96413 CHEMO IV INFUSION 1 HR: CPT

## 2024-12-04 RX ORDER — DIPHENHYDRAMINE HYDROCHLORIDE 50 MG/ML
50 INJECTION INTRAMUSCULAR; INTRAVENOUS AS NEEDED
OUTPATIENT
Start: 2024-12-18

## 2024-12-04 RX ORDER — HYDROCORTISONE SODIUM SUCCINATE 100 MG/2ML
100 INJECTION INTRAMUSCULAR; INTRAVENOUS AS NEEDED
OUTPATIENT
Start: 2024-12-18

## 2024-12-04 RX ORDER — FAMOTIDINE 10 MG/ML
20 INJECTION, SOLUTION INTRAVENOUS AS NEEDED
OUTPATIENT
Start: 2024-12-18

## 2024-12-04 RX ORDER — SODIUM CHLORIDE 9 MG/ML
20 INJECTION, SOLUTION INTRAVENOUS ONCE
OUTPATIENT
Start: 2024-12-18

## 2024-12-04 RX ADMIN — LECANEMAB 940 MG: 100 INJECTION, SOLUTION INTRAVENOUS at 11:47

## 2024-12-04 NOTE — NURSING NOTE
Pt here for 4th dose of leqembi and is scheduled for MRI on 12/9 before next dose on 12/18. Denies any signs of ARIA.    Pt tolerated infusion and discharged after 30 minute observation period in stable condition. Instructed to call the prescribing MD office for any concerns or developing symptoms prior to next appt pt vu

## 2024-12-06 ENCOUNTER — TELEPHONE (OUTPATIENT)
Dept: NEUROLOGY | Facility: CLINIC | Age: 64
End: 2024-12-06
Payer: COMMERCIAL

## 2024-12-06 NOTE — TELEPHONE ENCOUNTER
Camilla from [facility] called regarding this patient, stated the insurance had not been approved and needs to confirm the status by 3PM today or they will need to reschedule the appointment, please advise.     Callback : [phone] (Camilla)

## 2024-12-10 ENCOUNTER — TELEPHONE (OUTPATIENT)
Dept: NEUROLOGY | Facility: CLINIC | Age: 64
End: 2024-12-10

## 2024-12-10 NOTE — TELEPHONE ENCOUNTER
Caller: VALDEMAR    Relationship: [facility]    Best call back number: [phone]     What is the best time to reach you:     Who are you requesting to speak with (clinical staff, provider,  specific staff member):   TELMA    Do you know the name of the person who called:     What was the call regarding:  MRI PT IS SCHEDULED FOR ON 12-13-24 AT [facility].    NO RECORD OF PT HAVING THE MRI AT [facility]. HAS PT HAD ONE DONE OUTSIDE OF [facility]?    PLEASE SEE LETTER IN MEDIA TAB FROM PT'S INSURANCE SAYING THE MRI WAS ALREADY DONE AND NOT MEDICALLY NECESSARY    PLEASE CALL VALDEMAR BACK WITH WHAT THE OFFICE FINDS OUT ABOUT THIS MRI. DOES THE APPT NEED TO BE CANCELLED?

## 2024-12-13 ENCOUNTER — HOSPITAL ENCOUNTER (OUTPATIENT)
Facility: HOSPITAL | Age: 64
Discharge: HOME | End: 2024-12-13
Payer: COMMERCIAL

## 2024-12-13 DIAGNOSIS — G30.9 ALZHEIMER'S DISEASE: ICD-10-CM

## 2024-12-13 DIAGNOSIS — F02.80 ALZHEIMER'S DISEASE: ICD-10-CM

## 2024-12-13 PROCEDURE — 25510000002 GADOBENATE DIMEGLUMINE 529 MG/ML SOLUTION

## 2024-12-13 PROCEDURE — A9577 INJ MULTIHANCE: HCPCS

## 2024-12-13 PROCEDURE — 70553 MRI BRAIN STEM W/O & W/DYE: CPT

## 2024-12-13 RX ADMIN — GADOBENATE DIMEGLUMINE 20 ML: 529 INJECTION, SOLUTION INTRAVENOUS at 18:01

## 2024-12-17 DIAGNOSIS — G30.9 ALZHEIMER'S DISEASE: Primary | ICD-10-CM

## 2024-12-17 DIAGNOSIS — G31.84 AMNESTIC MCI (MILD COGNITIVE IMPAIRMENT WITH MEMORY LOSS): ICD-10-CM

## 2024-12-17 DIAGNOSIS — F02.80 ALZHEIMER'S DISEASE: Primary | ICD-10-CM

## 2024-12-18 ENCOUNTER — INFUSION (OUTPATIENT)
Dept: ONCOLOGY | Facility: HOSPITAL | Age: 64
End: 2024-12-18
Payer: COMMERCIAL

## 2024-12-18 VITALS
BODY MASS INDEX: 30.3 KG/M2 | OXYGEN SATURATION: 97 % | WEIGHT: 204.6 LBS | TEMPERATURE: 97.3 F | RESPIRATION RATE: 15 BRPM | HEIGHT: 69 IN | HEART RATE: 70 BPM | SYSTOLIC BLOOD PRESSURE: 156 MMHG | DIASTOLIC BLOOD PRESSURE: 78 MMHG

## 2024-12-18 DIAGNOSIS — F02.80 ALZHEIMER'S DISEASE: Primary | ICD-10-CM

## 2024-12-18 DIAGNOSIS — G30.9 ALZHEIMER'S DISEASE: Primary | ICD-10-CM

## 2024-12-18 PROCEDURE — 96413 CHEMO IV INFUSION 1 HR: CPT

## 2024-12-18 PROCEDURE — 25810000003 SODIUM CHLORIDE 0.9 % SOLUTION 250 ML FLEX CONT

## 2024-12-18 PROCEDURE — 96365 THER/PROPH/DIAG IV INF INIT: CPT

## 2024-12-18 PROCEDURE — 25010000002 LECANEMAB-IRMB 500 MG/5ML SOLUTION 5 ML VIAL

## 2024-12-18 RX ORDER — DIPHENHYDRAMINE HYDROCHLORIDE 50 MG/ML
50 INJECTION INTRAMUSCULAR; INTRAVENOUS AS NEEDED
OUTPATIENT
Start: 2025-01-01

## 2024-12-18 RX ORDER — SODIUM CHLORIDE 9 MG/ML
20 INJECTION, SOLUTION INTRAVENOUS ONCE
OUTPATIENT
Start: 2025-01-01

## 2024-12-18 RX ORDER — HYDROCORTISONE SODIUM SUCCINATE 100 MG/2ML
100 INJECTION INTRAMUSCULAR; INTRAVENOUS AS NEEDED
OUTPATIENT
Start: 2025-01-01

## 2024-12-18 RX ORDER — FAMOTIDINE 10 MG/ML
20 INJECTION, SOLUTION INTRAVENOUS AS NEEDED
OUTPATIENT
Start: 2025-01-01

## 2024-12-18 RX ADMIN — LECANEMAB 930 MG: 100 INJECTION, SOLUTION INTRAVENOUS at 10:57

## 2024-12-18 NOTE — NURSING NOTE
Pt declined 30 minute wait post infusion. Instructed to seek medical attention should he experience any adverse effects. He vu and discharged in stable condition.

## 2024-12-18 NOTE — NURSING NOTE
Pt arrived for 5th Leqembi infusion without any s/s ARIA. Noted MRI performed on 12/13. Tolerated infusion without incident.

## 2024-12-19 ENCOUNTER — OFFICE VISIT (OUTPATIENT)
Dept: NEUROLOGY | Facility: CLINIC | Age: 64
End: 2024-12-19
Payer: COMMERCIAL

## 2024-12-19 ENCOUNTER — TELEPHONE (OUTPATIENT)
Dept: NEUROLOGY | Facility: CLINIC | Age: 64
End: 2024-12-19

## 2024-12-19 VITALS
HEIGHT: 69 IN | RESPIRATION RATE: 20 BRPM | OXYGEN SATURATION: 97 % | WEIGHT: 206 LBS | BODY MASS INDEX: 30.51 KG/M2 | HEART RATE: 69 BPM | SYSTOLIC BLOOD PRESSURE: 132 MMHG | DIASTOLIC BLOOD PRESSURE: 70 MMHG

## 2024-12-19 DIAGNOSIS — F02.80 ALZHEIMER'S DISEASE: Primary | ICD-10-CM

## 2024-12-19 DIAGNOSIS — G30.9 ALZHEIMER'S DISEASE: Primary | ICD-10-CM

## 2024-12-19 DIAGNOSIS — Z51.81 THERAPEUTIC DRUG MONITORING: ICD-10-CM

## 2024-12-19 DIAGNOSIS — G31.84 AMNESTIC MCI (MILD COGNITIVE IMPAIRMENT WITH MEMORY LOSS): ICD-10-CM

## 2024-12-19 NOTE — PROGRESS NOTES
"DOS: 2024  NAME: Biju Verduzco   : 1960  PCP: Darren Pérez MD    Chief Complaint   Patient presents with    Amnestic MCI (mild cognitive impairment with memory loss)      SUBJECTIVE  Neurological Problem:  64 y.o. right-handed male with a past medical history of hypercholesterolemia, prediabetes, Alzheimer's disease, mild cognitive impairment amnestic type, ARSH (compliant with CPAP). They are seen in follow up today for Alzheimer's disease, monitoring of monoclonal antibody therapy. A summary of the history taken from the previous note with additions/modifications as indicated. He is unaccompanied.    Interval History:   **For detailed interval history see progress note dated 24.     Mr. Verduzco has followed with our clinic since 2022 for mild cognitive impairment, amnestic type, previously seen by Dr. Leary and more recently myself in 2024.  He has been maximally medically managed on donepezil 10 mg daily and memantine 10 mg twice daily.  He wants to be proactive in treating his cognitive impairment and did not want to progress to Alzheimer's disease. He has a strong family history of Alzheimer's disease as previously discussed in past visits.  We discussed treatment with lecanemab at length and he started monoclonal antibody therapy Oct 22, 2024.    He presents today, has received five infusions. MRI brain completed after fourth infusion, showed no signs of ARIA. He says he is feeling well and noticeably \"different\", more sharp mentally. He says he was getting lost when driving but is no longer doing that. He says his family has also noticed a positive improvement. He denies headache, dizziness, no visual or speech deficit, no face droop or trouble swallowing, no unilateral weakness or numbness to his extremities, no ataxia.     Review of Systems   Musculoskeletal:  Negative for gait problem.   Neurological:  Negative for dizziness, tremors, seizures, syncope, facial asymmetry, " speech difficulty, weakness, light-headedness, numbness and headaches.   Psychiatric/Behavioral:  Negative for agitation, behavioral problems, confusion, decreased concentration, dysphoric mood, hallucinations, self-injury, sleep disturbance and suicidal ideas. The patient is not nervous/anxious and is not hyperactive.         The following portions of the patient's history were reviewed and updated as appropriate: allergies, current medications, past family history, past medical history, past social history, past surgical history and problem list.    Current Medications:   Current Outpatient Medications:     ASPIRIN 81 PO, Take 1 tablet by mouth Daily., Disp: , Rfl:     atorvastatin (LIPITOR) 10 MG tablet, TAKE 1 TABLET BY MOUTH ONCE DAILY FOR CHOLESTEROL AND HEART HEALTH, Disp: 90 tablet, Rfl: 0    Cholecalciferol (VITAMIN D PO), Take 1 tablet by mouth Daily., Disp: , Rfl:     donepezil (ARICEPT) 10 MG tablet, Take 1 tablet by mouth Every Night., Disp: 90 tablet, Rfl: 1    meclizine (ANTIVERT) 25 MG tablet, Take 1 tablet by mouth 3 (Three) Times a Day As Needed for Dizziness., Disp: 30 tablet, Rfl: 2    memantine (NAMENDA) 10 MG tablet, TAKE 1 TABLET BY MOUTH EVERY DAY, Disp: 90 tablet, Rfl: 3    Multiple Vitamins-Minerals (MULTIVITAMIN ADULT PO), Take 1 tablet by mouth Daily., Disp: , Rfl:     Multiple Vitamins-Minerals (ZINC PO), Take 1 tablet by mouth Daily., Disp: , Rfl:     Thiamine HCl (VITAMIN B-1 PO), Take 1 tablet by mouth Daily., Disp: , Rfl:     vitamin C (ASCORBIC ACID) 250 MG tablet, Take 1 tablet by mouth Daily., Disp: , Rfl:     vitamin E 200 UNIT capsule, Take 1 capsule by mouth Daily., Disp: , Rfl:     sildenafil (Viagra) 50 MG tablet, Take 1 tablet by mouth Daily As Needed for Erectile Dysfunction. (Patient not taking: Reported on 12/19/2024), Disp: 30 tablet, Rfl: 3    venlafaxine XR (EFFEXOR-XR) 37.5 MG 24 hr capsule, TAKE 1 CAPSULE ONCE DAILY FOR MOOD AND CONCENTRATION (Patient not taking:  "Reported on 12/19/2024), Disp: 90 capsule, Rfl: 3  No current facility-administered medications for this visit.  **I did not stop or change the above medications.  Patient's medication list was updated to reflect medications they have reported as currently taking, including medication changes made by other providers.    Objective   Vital Signs:  /70   Pulse 69   Resp 20   Ht 175.3 cm (69.02\")   Wt 93.4 kg (206 lb)   SpO2 97%   BMI 30.41 kg/m²   Body mass index is 30.41 kg/m².    Physical Exam   Physical Exam:  GENERAL: NAD, alert  HEENT: Normocephalic, atraumatic   Resp: Even and unlabored    Neurological:   MS: AOx3, recent/remote memory intact, normal attention/concentration, language intact, no neglect.   CN: visual acuity grossly normal, PERRL, EOMI, no nystagmus, no facial droop, no dysarthria  Motor: 5/5 strength all 4 ext. Normal tone and bulk. No tremor or abnormal movements noted.   Sensory: Intact to crude and light touch in all four ext.  Gait and station: Normal gait and station    Result Review :  The following data was reviewed by: ERIKA Awad on 12/19/2024:  Laboratory Results:         Lab Results   Component Value Date    WBC 7.46 07/31/2024    HGB 14.1 07/31/2024    HCT 43.5 07/31/2024    MCV 85.8 07/31/2024     07/31/2024     Lab Results   Component Value Date    GLUCOSE 109 (H) 12/12/2023    BUN 15 12/12/2023    CREATININE 0.90 12/12/2023    EGFRIFNONA 95 10/05/2021    EGFRIFAFRI 110 10/05/2021    BCR 17 12/12/2023    K 4.5 12/12/2023    CO2 25 12/12/2023    CALCIUM 9.5 12/12/2023    PROTENTOTREF 7.3 12/12/2023    ALBUMIN 4.5 12/12/2023    LABIL2 1.6 12/12/2023    AST 24 12/12/2023    ALT 26 12/12/2023     Lab Results   Component Value Date    HGBA1C 5.8 (H) 12/12/2023     Lab Results   Component Value Date    CHOL 231 (H) 03/06/2019    CHOL 213 (H) 12/14/2016     Lab Results   Component Value Date    HDL 42 12/12/2023    HDL 34 (L) 10/07/2022    HDL 40 10/05/2021 "     Lab Results   Component Value Date    LDL 98 12/12/2023     (H) 10/07/2022     (H) 10/05/2021     Lab Results   Component Value Date    TRIG 199 (H) 12/12/2023    TRIG 324 (H) 10/07/2022    TRIG 198 (H) 10/05/2021     Lab Results   Component Value Date    RPR Non-Reactive 07/31/2024     Lab Results   Component Value Date    TSH 2.930 07/31/2024     Lab Results   Component Value Date    QGEJBHLZ77 1,401 (H) 07/31/2024       Data reviewed : Radiologic studies             Assessment and Plan   Diagnoses and all orders for this visit:    1. Alzheimer's disease (Primary)    2. Amnestic MCI (mild cognitive impairment with memory loss)    3. Therapeutic drug monitoring      Okay to continue monoclonal antibody therapy, received fifth dose yesterday, 12/18.  Scheduled to receive sixth dose at the beginning of January then will have third MRI to evaluate for ARIA.  MRI previously ordered on 12/17. Discussed stroke-like symptoms/red flag symptoms and when to report to ED.    We will see Biju back in approximately 1 month, sooner if symptoms warrant.     ERIKA Awad  [facility]   12/19/24       I spent 30 minutes caring for Biju on this date of service. This time includes time spent by me in the following activities:preparing for the visit, reviewing tests, obtaining and/or reviewing a separately obtained history, performing a medically appropriate examination and/or evaluation , counseling and educating the patient/family/caregiver, ordering medications, tests, or procedures, referring and communicating with other health care professionals , documenting information in the medical record, independently interpreting results and communicating that information with the patient/family/caregiver, and care coordination  Follow Up   No follow-ups on file.  Patient was given instructions and counseling regarding his condition or for health maintenance advice. Please see specific information pulled into  the AVS if appropriate.       Dictated using Dragon Dictation

## 2024-12-19 NOTE — TELEPHONE ENCOUNTER
Called patient to discuss Leqembi infusion and MRI schedule.  Scheduled follow up with ERIKA Awad, for 1/15/25.  Left voicemail for patient.

## 2024-12-26 ENCOUNTER — OFFICE VISIT (OUTPATIENT)
Dept: INTERNAL MEDICINE | Facility: CLINIC | Age: 64
End: 2024-12-26
Payer: COMMERCIAL

## 2024-12-26 VITALS
HEART RATE: 54 BPM | TEMPERATURE: 97.5 F | DIASTOLIC BLOOD PRESSURE: 76 MMHG | HEIGHT: 69 IN | BODY MASS INDEX: 30.42 KG/M2 | OXYGEN SATURATION: 98 % | SYSTOLIC BLOOD PRESSURE: 138 MMHG

## 2024-12-26 DIAGNOSIS — F02.80 ALZHEIMER'S DISEASE: ICD-10-CM

## 2024-12-26 DIAGNOSIS — G47.33 OSA ON CPAP: ICD-10-CM

## 2024-12-26 DIAGNOSIS — G30.9 ALZHEIMER'S DISEASE: ICD-10-CM

## 2024-12-26 DIAGNOSIS — Z00.00 ANNUAL PHYSICAL EXAM: Primary | ICD-10-CM

## 2024-12-26 DIAGNOSIS — R45.4 IRRITABILITY AND ANGER: ICD-10-CM

## 2024-12-26 DIAGNOSIS — Z13.89 SCREENING FOR BLOOD OR PROTEIN IN URINE: ICD-10-CM

## 2024-12-26 DIAGNOSIS — R73.03 PREDIABETES: ICD-10-CM

## 2024-12-26 DIAGNOSIS — Z12.5 SCREENING FOR MALIGNANT NEOPLASM OF PROSTATE: ICD-10-CM

## 2024-12-26 DIAGNOSIS — Z23 NEED FOR TDAP VACCINATION: ICD-10-CM

## 2024-12-26 DIAGNOSIS — B35.3 TINEA PEDIS, UNSPECIFIED LATERALITY: ICD-10-CM

## 2024-12-26 DIAGNOSIS — Z23 NEED FOR VACCINATION FOR PNEUMOCOCCUS: ICD-10-CM

## 2024-12-26 DIAGNOSIS — E78.00 HYPERCHOLESTEREMIA: ICD-10-CM

## 2024-12-26 PROBLEM — R42 DIZZINESS: Status: RESOLVED | Noted: 2022-12-08 | Resolved: 2024-12-26

## 2024-12-26 PROBLEM — G31.84 AMNESTIC MCI (MILD COGNITIVE IMPAIRMENT WITH MEMORY LOSS): Status: RESOLVED | Noted: 2020-12-28 | Resolved: 2024-12-26

## 2024-12-26 PROBLEM — G47.14 HYPERSOMNIA DUE TO MEDICAL CONDITION: Status: RESOLVED | Noted: 2023-10-06 | Resolved: 2024-12-26

## 2024-12-26 PROCEDURE — 90472 IMMUNIZATION ADMIN EACH ADD: CPT | Performed by: FAMILY MEDICINE

## 2024-12-26 PROCEDURE — 99396 PREV VISIT EST AGE 40-64: CPT | Performed by: FAMILY MEDICINE

## 2024-12-26 PROCEDURE — 90677 PCV20 VACCINE IM: CPT | Performed by: FAMILY MEDICINE

## 2024-12-26 PROCEDURE — 90471 IMMUNIZATION ADMIN: CPT | Performed by: FAMILY MEDICINE

## 2024-12-26 PROCEDURE — 90715 TDAP VACCINE 7 YRS/> IM: CPT | Performed by: FAMILY MEDICINE

## 2024-12-26 RX ORDER — MEMANTINE HYDROCHLORIDE 10 MG/1
10 TABLET ORAL DAILY
Qty: 90 TABLET | Refills: 3 | Status: SHIPPED | OUTPATIENT
Start: 2024-12-26

## 2024-12-26 RX ORDER — CLOTRIMAZOLE 1 %
1 CREAM (GRAM) TOPICAL 2 TIMES DAILY
Qty: 85 G | Refills: 3 | Status: SHIPPED | OUTPATIENT
Start: 2024-12-26

## 2024-12-26 RX ORDER — VENLAFAXINE HYDROCHLORIDE 37.5 MG/1
CAPSULE, EXTENDED RELEASE ORAL
Qty: 90 CAPSULE | Refills: 3 | Status: SHIPPED | OUTPATIENT
Start: 2024-12-26

## 2024-12-26 RX ORDER — DONEPEZIL HYDROCHLORIDE 10 MG/1
10 TABLET, FILM COATED ORAL NIGHTLY
Qty: 90 TABLET | Refills: 3 | Status: SHIPPED | OUTPATIENT
Start: 2024-12-26

## 2024-12-26 NOTE — PROGRESS NOTES
Date of Encounter: 2024  Patient: Biju Verduzco,  1960    Subjective   History of Presenting Illness  Chief complaint: Annual physical     Her last visit patient has been diagnosed with Alzheimer's disease and has started lecanemab therapy.  He is also on donepezil, memantine, and getting good control with respect to his irritability and anger with venlafaxine.  He has been tolerating these infusions well.  He describes improvement in his memory with better recognition of faces and names, better spatial navigation around town.  His family has also noticed improvement as well.  Insurance giving him some difficulty with serial MRI coverage but he wants to continue this therapy by any means.  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  He does use a lot of list taking and photos to manage his impairment, as well as being candid about his disease to people he works with.    Hyperlipidemia tolerating statin therapy well.    Prediabetes, he has been playing pickle ball once weekly, diet described as average.    ARSH on CPAP.  Hates the machine but knows it helps him and his memory so continues to use it.    allergic rhinitis, not taking any medications at this time.    Lives with wife. Never smoker.  Minimal current alcohol use. Plays pickleball once weekly. Average diet.  Colonoscopy  with 10-year interval. 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  Has a living will.  Not DNR but does not want unnecessary life-prolonging care if quality of life is poor. Amenable to pneumococcal and Tdap today.    The following portions of the patient's history were reviewed and updated as appropriate: allergies, current medications, past family history, past medical history, past social history, past surgical history and problem list.    Patient Active Problem List   Diagnosis    ED (erectile dysfunction)    Hypercholesteremia    Seasonal allergic rhinitis due to pollen    Biological false  positive RPR test    Irritability and anger    Prediabetes    ARSH on CPAP    Burn scar contracture of multiple sites    Alzheimer's disease     Past Medical History:   Diagnosis Date    ADD (attention deficit disorder)     Anxiety     Atypical chest pain 03/06/2019    Borderline systolic HTN 12/10/2019    Depression     ED (erectile dysfunction)     Epicondylitis elbow, medial, left 06/04/2019    Erectile dysfunction     Hypercholesteremia     ARSH (obstructive sleep apnea) 07/16/2006    Overnight polysomnogram.  Weight 185 pounds.  Mild ARSH with AHI 9.9 events per hour.  When supine, moderately abnormal at 23.8 events per hour.  No sleep-related hypoxia.    Skin lesion of scalp 03/23/2020    Dermatologic follow-up     Past Surgical History:   Procedure Laterality Date    CARDIOVASCULAR STRESS TEST  2013    stress Echo     Family History   Problem Relation Age of Onset    Hypothyroidism Mother     Heart disease Father        Current Outpatient Medications:     ASPIRIN 81 PO, Take 1 tablet by mouth Daily., Disp: , Rfl:     atorvastatin (LIPITOR) 10 MG tablet, TAKE 1 TABLET BY MOUTH ONCE DAILY FOR CHOLESTEROL AND HEART HEALTH, Disp: 90 tablet, Rfl: 0    Cholecalciferol (VITAMIN D PO), Take 1 tablet by mouth Daily., Disp: , Rfl:     donepezil (ARICEPT) 10 MG tablet, Take 1 tablet by mouth Every Night., Disp: 90 tablet, Rfl: 3    meclizine (ANTIVERT) 25 MG tablet, Take 1 tablet by mouth 3 (Three) Times a Day As Needed for Dizziness., Disp: 30 tablet, Rfl: 2    memantine (NAMENDA) 10 MG tablet, Take 1 tablet by mouth Daily., Disp: 90 tablet, Rfl: 3    Multiple Vitamins-Minerals (MULTIVITAMIN ADULT PO), Take 1 tablet by mouth Daily., Disp: , Rfl:     Multiple Vitamins-Minerals (ZINC PO), Take 1 tablet by mouth Daily., Disp: , Rfl:     sildenafil (Viagra) 50 MG tablet, Take 1 tablet by mouth Daily As Needed for Erectile Dysfunction., Disp: 30 tablet, Rfl: 3    Thiamine HCl (VITAMIN B-1 PO), Take 1 tablet by mouth Daily.,  "Disp: , Rfl:     venlafaxine XR (EFFEXOR-XR) 37.5 MG 24 hr capsule, TAKE 1 CAPSULE ONCE DAILY FOR MOOD AND CONCENTRATION, Disp: 90 capsule, Rfl: 3    vitamin C (ASCORBIC ACID) 250 MG tablet, Take 1 tablet by mouth Daily., Disp: , Rfl:     vitamin E 200 UNIT capsule, Take 1 capsule by mouth Daily., Disp: , Rfl:     clotrimazole (LOTRIMIN) 1 % cream, Apply 1 Application topically to the appropriate area as directed 2 (Two) Times a Day., Disp: 85 g, Rfl: 3  Allergies   Allergen Reactions    Wellbutrin [Bupropion] Tinnitus     Social History     Tobacco Use    Smoking status: Never     Passive exposure: Never    Smokeless tobacco: Never   Vaping Use    Vaping status: Never Used   Substance Use Topics    Alcohol use: Yes     Alcohol/week: 4.0 standard drinks of alcohol     Types: 4 Shots of liquor per week     Comment: Only Socially    Drug use: Never          Objective   Physical Exam  Vitals:    12/26/24 1040   BP: 138/76   BP Location: Left arm   Patient Position: Sitting   Cuff Size: Adult   Pulse: 54   Temp: 97.5 °F (36.4 °C)   TempSrc: Infrared   SpO2: 98%   Height: 175.3 cm (69\")     Body mass index is 30.42 kg/m².    Constitutional: NAD.  Eyes: EOMI. PERRLA. Normal conjunctiva.  Ear, nose, mouth, throat: No tonsillar exudates or erythema.   Normal nasal mucosa. Normal external ear canals and TMs bilaterally.  Cardiovascular: RRR. No murmurs. No LE edema b/l. Radial pulses 2+ bilaterally.  Pulmonary: CTA b/l. Good effort.  Integumentary: No rashes or wounds on face or upper extremities.  Lymphatic: No anterior cervical lymphadenopathy.  Endocrine: No thyromegaly or palpable thyroid nodules.  Psychiatric: Normal affect.  Mood reported as good.  Gastrointestinal: Nondistended. No hepatosplenomegaly. No focal tenderness to palpation.      Assessment & Plan   Assessment and Plan  Pleasant 64-year-old male with Alzheimer's disease, prediabetes, hyperlipidemia, ARSH on CPAP, who presents for the following:    Diagnoses " and all orders for this visit:    1. Annual physical exam (Primary): We discussed preventative care including age and patient-appropriate immunizations and cancer screening. We also discussed the importance of exercise and a healthy diet. This is their annual preventative exam.    2. Alzheimer's disease: Thrilled to see that he is having symptomatic improvement that both he and his family recognize on lecanemab therapy.  Continue to follow closely with neurology.  Discussed third-party imaging if MRI becomes unaffordable or not covered by insurance.  Encouraged him to continue using CPAP and discussed other lifestyle measures for memory.  -     donepezil (ARICEPT) 10 MG tablet; Take 1 tablet by mouth Every Night.  Dispense: 90 tablet; Refill: 3  -     memantine (NAMENDA) 10 MG tablet; Take 1 tablet by mouth Daily.  Dispense: 90 tablet; Refill: 3  -     Vitamin B12  -     Folate  -     Vitamin D,25-Hydroxy    3. Irritability and anger: Controlled  -     venlafaxine XR (EFFEXOR-XR) 37.5 MG 24 hr capsule; TAKE 1 CAPSULE ONCE DAILY FOR MOOD AND CONCENTRATION  Dispense: 90 capsule; Refill: 3    4. Prediabetes  -     Hemoglobin A1c    5. Hypercholesteremia: Tolerating statin and aspirin with small vessel disease on MRI  -     CBC & Differential  -     Comprehensive Metabolic Panel  -     Lipid Panel  -     T4, Free  -     T3, Free  -     TSH    6. ARSH on CPAP: Adherent    7. Tinea pedis, unspecified laterality  -     clotrimazole (LOTRIMIN) 1 % cream; Apply 1 Application topically to the appropriate area as directed 2 (Two) Times a Day.  Dispense: 85 g; Refill: 3    8. Screening for malignant neoplasm of prostate  -     PSA Screen    9. Screening for blood or protein in urine  -     Urinalysis With Microscopic - Urine, Clean Catch    10. Need for Tdap vaccination  -     Tdap Vaccine Greater Than or Equal To 6yo IM    11. Need for vaccination for pneumococcus  -     Pneumococcal Conjugate Vaccine 20-Valent (PCV20)      BMI  is >= 30 and <35. (Class 1 Obesity). The following options were offered after discussion;: information on healthy weight added to patient's after visit summary     Return to office in 1 year for annual physical or earlier as needed.    Darren Pérez MD  Family Medicine  O: [phone]    Disclaimer: Parts of this note were dictated by speech recognition. Minor errors in transcription may be present. Please call if questions.

## 2024-12-27 LAB
25(OH)D3+25(OH)D2 SERPL-MCNC: 27.9 NG/ML (ref 30–100)
ALBUMIN SERPL-MCNC: 4.5 G/DL (ref 3.9–4.9)
ALP SERPL-CCNC: 104 IU/L (ref 44–121)
ALT SERPL-CCNC: 23 IU/L (ref 0–44)
APPEARANCE UR: CLEAR
AST SERPL-CCNC: 26 IU/L (ref 0–40)
BACTERIA #/AREA URNS HPF: NORMAL /[HPF]
BASOPHILS # BLD AUTO: 0.1 X10E3/UL (ref 0–0.2)
BASOPHILS NFR BLD AUTO: 1 %
BILIRUB SERPL-MCNC: 0.2 MG/DL (ref 0–1.2)
BILIRUB UR QL STRIP: NEGATIVE
BUN SERPL-MCNC: 13 MG/DL (ref 8–27)
BUN/CREAT SERPL: 14 (ref 10–24)
CALCIUM SERPL-MCNC: 9.8 MG/DL (ref 8.6–10.2)
CASTS URNS QL MICRO: NORMAL /LPF
CHLORIDE SERPL-SCNC: 102 MMOL/L (ref 96–106)
CHOLEST SERPL-MCNC: 172 MG/DL (ref 100–199)
CO2 SERPL-SCNC: 26 MMOL/L (ref 20–29)
COLOR UR: YELLOW
CREAT SERPL-MCNC: 0.94 MG/DL (ref 0.76–1.27)
EGFRCR SERPLBLD CKD-EPI 2021: 91 ML/MIN/1.73
EOSINOPHIL # BLD AUTO: 0.3 X10E3/UL (ref 0–0.4)
EOSINOPHIL NFR BLD AUTO: 3 %
EPI CELLS #/AREA URNS HPF: NORMAL /HPF (ref 0–10)
ERYTHROCYTE [DISTWIDTH] IN BLOOD BY AUTOMATED COUNT: 12.9 % (ref 11.6–15.4)
FOLATE SERPL-MCNC: 9.4 NG/ML
GLOBULIN SER CALC-MCNC: 2.8 G/DL (ref 1.5–4.5)
GLUCOSE SERPL-MCNC: 105 MG/DL (ref 70–99)
GLUCOSE UR QL STRIP: NEGATIVE
HBA1C MFR BLD: 5.9 % (ref 4.8–5.6)
HCT VFR BLD AUTO: 41.9 % (ref 37.5–51)
HDLC SERPL-MCNC: 40 MG/DL
HGB BLD-MCNC: 13.3 G/DL (ref 13–17.7)
HGB UR QL STRIP: NEGATIVE
IMM GRANULOCYTES # BLD AUTO: 0 X10E3/UL (ref 0–0.1)
IMM GRANULOCYTES NFR BLD AUTO: 0 %
KETONES UR QL STRIP: NEGATIVE
LDLC SERPL CALC-MCNC: 86 MG/DL (ref 0–99)
LEUKOCYTE ESTERASE UR QL STRIP: NEGATIVE
LYMPHOCYTES # BLD AUTO: 2.2 X10E3/UL (ref 0.7–3.1)
LYMPHOCYTES NFR BLD AUTO: 28 %
MCH RBC QN AUTO: 28.2 PG (ref 26.6–33)
MCHC RBC AUTO-ENTMCNC: 31.7 G/DL (ref 31.5–35.7)
MCV RBC AUTO: 89 FL (ref 79–97)
MICRO URNS: NORMAL
MICRO URNS: NORMAL
MONOCYTES # BLD AUTO: 0.8 X10E3/UL (ref 0.1–0.9)
MONOCYTES NFR BLD AUTO: 9 %
NEUTROPHILS # BLD AUTO: 4.8 X10E3/UL (ref 1.4–7)
NEUTROPHILS NFR BLD AUTO: 59 %
NITRITE UR QL STRIP: NEGATIVE
PH UR STRIP: 5.5 [PH] (ref 5–7.5)
PLATELET # BLD AUTO: 353 X10E3/UL (ref 150–450)
POTASSIUM SERPL-SCNC: 4.4 MMOL/L (ref 3.5–5.2)
PROT SERPL-MCNC: 7.3 G/DL (ref 6–8.5)
PROT UR QL STRIP: NEGATIVE
PSA SERPL-MCNC: 0.3 NG/ML (ref 0–4)
RBC # BLD AUTO: 4.71 X10E6/UL (ref 4.14–5.8)
RBC #/AREA URNS HPF: NORMAL /HPF (ref 0–2)
SODIUM SERPL-SCNC: 141 MMOL/L (ref 134–144)
SP GR UR STRIP: 1.02 (ref 1–1.03)
T3FREE SERPL-MCNC: 3.3 PG/ML (ref 2–4.4)
T4 FREE SERPL-MCNC: 1.12 NG/DL (ref 0.82–1.77)
TRIGL SERPL-MCNC: 277 MG/DL (ref 0–149)
TSH SERPL DL<=0.005 MIU/L-ACNC: 2.66 UIU/ML (ref 0.45–4.5)
UROBILINOGEN UR STRIP-MCNC: 0.2 MG/DL (ref 0.2–1)
VIT B12 SERPL-MCNC: 1095 PG/ML (ref 232–1245)
VLDLC SERPL CALC-MCNC: 46 MG/DL (ref 5–40)
WBC # BLD AUTO: 8.1 X10E3/UL (ref 3.4–10.8)
WBC #/AREA URNS HPF: NORMAL /HPF (ref 0–5)

## 2025-01-01 DIAGNOSIS — E55.9 VITAMIN D DEFICIENCY: ICD-10-CM

## 2025-01-01 DIAGNOSIS — F02.80 ALZHEIMER'S DISEASE: Primary | ICD-10-CM

## 2025-01-01 DIAGNOSIS — G30.9 ALZHEIMER'S DISEASE: Primary | ICD-10-CM

## 2025-01-01 RX ORDER — CHOLECALCIFEROL (VITAMIN D3) 50 MCG
TABLET ORAL
Qty: 90 EACH | Refills: 3 | Status: SHIPPED | OUTPATIENT
Start: 2025-01-01

## 2025-01-02 ENCOUNTER — INFUSION (OUTPATIENT)
Dept: ONCOLOGY | Facility: HOSPITAL | Age: 65
End: 2025-01-02
Payer: COMMERCIAL

## 2025-01-02 VITALS
DIASTOLIC BLOOD PRESSURE: 78 MMHG | SYSTOLIC BLOOD PRESSURE: 124 MMHG | OXYGEN SATURATION: 97 % | HEART RATE: 66 BPM | RESPIRATION RATE: 16 BRPM | TEMPERATURE: 97.4 F | BODY MASS INDEX: 30.18 KG/M2 | WEIGHT: 204.4 LBS

## 2025-01-02 DIAGNOSIS — G30.9 ALZHEIMER'S DISEASE: Primary | ICD-10-CM

## 2025-01-02 DIAGNOSIS — F02.80 ALZHEIMER'S DISEASE: Primary | ICD-10-CM

## 2025-01-02 DIAGNOSIS — G31.84 AMNESTIC MCI (MILD COGNITIVE IMPAIRMENT WITH MEMORY LOSS): ICD-10-CM

## 2025-01-02 DIAGNOSIS — Z51.81 THERAPEUTIC DRUG MONITORING: ICD-10-CM

## 2025-01-02 PROCEDURE — 96365 THER/PROPH/DIAG IV INF INIT: CPT

## 2025-01-02 PROCEDURE — 25010000002 LECANEMAB-IRMB 500 MG/5ML SOLUTION 5 ML VIAL

## 2025-01-02 PROCEDURE — 96413 CHEMO IV INFUSION 1 HR: CPT

## 2025-01-02 PROCEDURE — 25810000003 SODIUM CHLORIDE 0.9 % SOLUTION 250 ML FLEX CONT

## 2025-01-02 RX ORDER — DIPHENHYDRAMINE HYDROCHLORIDE 50 MG/ML
50 INJECTION INTRAMUSCULAR; INTRAVENOUS AS NEEDED
OUTPATIENT
Start: 2025-01-15

## 2025-01-02 RX ORDER — HYDROCORTISONE SODIUM SUCCINATE 100 MG/2ML
100 INJECTION INTRAMUSCULAR; INTRAVENOUS AS NEEDED
OUTPATIENT
Start: 2025-01-15

## 2025-01-02 RX ORDER — FAMOTIDINE 10 MG/ML
20 INJECTION, SOLUTION INTRAVENOUS AS NEEDED
OUTPATIENT
Start: 2025-01-15

## 2025-01-02 RX ORDER — SODIUM CHLORIDE 9 MG/ML
20 INJECTION, SOLUTION INTRAVENOUS ONCE
OUTPATIENT
Start: 2025-01-15

## 2025-01-02 RX ADMIN — LECANEMAB 930 MG: 100 INJECTION, SOLUTION INTRAVENOUS at 10:30

## 2025-01-02 NOTE — NURSING NOTE
Pt arrived for 6th Leqembi infusion and denies any s/s ARIA. Reminded pt that he will need MRI prior to next infusion on 1/21 per protocol. Pt verbalized understanding. Pt declined post infusion observation and discharged in stable condition.

## 2025-01-20 ENCOUNTER — HOSPITAL ENCOUNTER (OUTPATIENT)
Dept: MRI IMAGING | Facility: HOSPITAL | Age: 65
Discharge: HOME | End: 2025-01-20
Payer: COMMERCIAL

## 2025-01-20 DIAGNOSIS — G31.84 MILD COGNITIVE IMPAIRMENT: ICD-10-CM

## 2025-01-20 PROCEDURE — 25510000002 GADOBENATE DIMEGLUMINE 529 MG/ML SOLUTION

## 2025-01-20 PROCEDURE — A9577 INJ MULTIHANCE: HCPCS

## 2025-01-20 PROCEDURE — 70553 MRI BRAIN STEM W/O & W/DYE: CPT

## 2025-01-20 RX ADMIN — GADOBENATE DIMEGLUMINE 20 ML: 529 INJECTION, SOLUTION INTRAVENOUS at 12:00

## 2025-01-21 ENCOUNTER — INFUSION (OUTPATIENT)
Dept: ONCOLOGY | Facility: HOSPITAL | Age: 65
End: 2025-01-21
Payer: COMMERCIAL

## 2025-01-21 VITALS
SYSTOLIC BLOOD PRESSURE: 136 MMHG | TEMPERATURE: 98 F | WEIGHT: 209.6 LBS | HEART RATE: 70 BPM | BODY MASS INDEX: 31.04 KG/M2 | OXYGEN SATURATION: 98 % | HEIGHT: 69 IN | DIASTOLIC BLOOD PRESSURE: 78 MMHG | RESPIRATION RATE: 15 BRPM

## 2025-01-21 DIAGNOSIS — F02.80 ALZHEIMER'S DISEASE: Primary | ICD-10-CM

## 2025-01-21 DIAGNOSIS — G30.9 ALZHEIMER'S DISEASE: Primary | ICD-10-CM

## 2025-01-21 PROCEDURE — 96365 THER/PROPH/DIAG IV INF INIT: CPT

## 2025-01-21 PROCEDURE — 25810000003 SODIUM CHLORIDE 0.9 % SOLUTION 250 ML FLEX CONT

## 2025-01-21 PROCEDURE — 96413 CHEMO IV INFUSION 1 HR: CPT

## 2025-01-21 PROCEDURE — 25010000002 LECANEMAB-IRMB 500 MG/5ML SOLUTION 5 ML VIAL

## 2025-01-21 RX ORDER — HYDROCORTISONE SODIUM SUCCINATE 100 MG/2ML
100 INJECTION INTRAMUSCULAR; INTRAVENOUS AS NEEDED
OUTPATIENT
Start: 2025-01-28

## 2025-01-21 RX ORDER — FAMOTIDINE 10 MG/ML
20 INJECTION, SOLUTION INTRAVENOUS AS NEEDED
OUTPATIENT
Start: 2025-01-28

## 2025-01-21 RX ORDER — SODIUM CHLORIDE 9 MG/ML
20 INJECTION, SOLUTION INTRAVENOUS ONCE
OUTPATIENT
Start: 2025-01-28

## 2025-01-21 RX ORDER — DIPHENHYDRAMINE HYDROCHLORIDE 50 MG/ML
50 INJECTION INTRAMUSCULAR; INTRAVENOUS AS NEEDED
OUTPATIENT
Start: 2025-01-28

## 2025-01-21 RX ADMIN — LECANEMAB 950 MG: 100 INJECTION, SOLUTION INTRAVENOUS at 10:26

## 2025-01-21 NOTE — NURSING NOTE
Pt arrived for his 7th Leqembi infusion having his MRI completed yesterday. MRI not yet read by radiologist prior to patient arrival. MRI reviewed with Hayde Owusu's office, okay to treat today. Pt tolerated infusion without complications and elected to not stay for the post wait. Pt instructed to contact the prescribing MD office with any questions or concerns prior to his next appt. Pt verbalized understanding and was discharged in a stable condition.

## 2025-02-04 ENCOUNTER — INFUSION (OUTPATIENT)
Dept: ONCOLOGY | Facility: HOSPITAL | Age: 65
End: 2025-02-04
Payer: COMMERCIAL

## 2025-02-04 VITALS
BODY MASS INDEX: 30.81 KG/M2 | TEMPERATURE: 96.4 F | HEART RATE: 70 BPM | HEIGHT: 69 IN | RESPIRATION RATE: 15 BRPM | WEIGHT: 208 LBS | OXYGEN SATURATION: 96 % | SYSTOLIC BLOOD PRESSURE: 130 MMHG | DIASTOLIC BLOOD PRESSURE: 77 MMHG

## 2025-02-04 DIAGNOSIS — F02.80 ALZHEIMER'S DISEASE: Primary | ICD-10-CM

## 2025-02-04 DIAGNOSIS — G30.9 ALZHEIMER'S DISEASE: Primary | ICD-10-CM

## 2025-02-04 PROCEDURE — 96413 CHEMO IV INFUSION 1 HR: CPT

## 2025-02-04 PROCEDURE — 96365 THER/PROPH/DIAG IV INF INIT: CPT

## 2025-02-04 PROCEDURE — 25010000002 LECANEMAB-IRMB 500 MG/5ML SOLUTION 5 ML VIAL

## 2025-02-04 PROCEDURE — 25810000003 SODIUM CHLORIDE 0.9 % SOLUTION 250 ML FLEX CONT

## 2025-02-04 RX ORDER — SODIUM CHLORIDE 9 MG/ML
20 INJECTION, SOLUTION INTRAVENOUS ONCE
OUTPATIENT
Start: 2025-02-11

## 2025-02-04 RX ORDER — FAMOTIDINE 10 MG/ML
20 INJECTION, SOLUTION INTRAVENOUS AS NEEDED
OUTPATIENT
Start: 2025-02-11

## 2025-02-04 RX ORDER — DIPHENHYDRAMINE HYDROCHLORIDE 50 MG/ML
50 INJECTION INTRAMUSCULAR; INTRAVENOUS AS NEEDED
OUTPATIENT
Start: 2025-02-11

## 2025-02-04 RX ORDER — HYDROCORTISONE SODIUM SUCCINATE 100 MG/2ML
100 INJECTION INTRAMUSCULAR; INTRAVENOUS AS NEEDED
OUTPATIENT
Start: 2025-02-11

## 2025-02-04 RX ADMIN — LECANEMAB 950 MG: 100 INJECTION, SOLUTION INTRAVENOUS at 11:18

## 2025-02-12 DIAGNOSIS — E78.00 HYPERCHOLESTEREMIA: ICD-10-CM

## 2025-02-12 RX ORDER — ATORVASTATIN CALCIUM 10 MG/1
TABLET, FILM COATED ORAL
Qty: 90 TABLET | Refills: 0 | Status: SHIPPED | OUTPATIENT
Start: 2025-02-12

## 2025-02-18 ENCOUNTER — INFUSION (OUTPATIENT)
Dept: ONCOLOGY | Facility: HOSPITAL | Age: 65
End: 2025-02-18
Payer: COMMERCIAL

## 2025-02-18 VITALS
HEART RATE: 63 BPM | TEMPERATURE: 97.8 F | HEIGHT: 69 IN | OXYGEN SATURATION: 98 % | RESPIRATION RATE: 15 BRPM | SYSTOLIC BLOOD PRESSURE: 127 MMHG | BODY MASS INDEX: 30.87 KG/M2 | WEIGHT: 208.4 LBS | DIASTOLIC BLOOD PRESSURE: 76 MMHG

## 2025-02-18 DIAGNOSIS — G30.9 ALZHEIMER'S DISEASE: Primary | ICD-10-CM

## 2025-02-18 DIAGNOSIS — F02.80 ALZHEIMER'S DISEASE: Primary | ICD-10-CM

## 2025-02-18 PROCEDURE — 25010000002 LECANEMAB-IRMB 500 MG/5ML SOLUTION 5 ML VIAL

## 2025-02-18 PROCEDURE — 96413 CHEMO IV INFUSION 1 HR: CPT

## 2025-02-18 PROCEDURE — 96365 THER/PROPH/DIAG IV INF INIT: CPT

## 2025-02-18 PROCEDURE — 25810000003 SODIUM CHLORIDE 0.9 % SOLUTION 250 ML FLEX CONT

## 2025-02-18 RX ORDER — SODIUM CHLORIDE 9 MG/ML
20 INJECTION, SOLUTION INTRAVENOUS ONCE
OUTPATIENT
Start: 2025-02-25

## 2025-02-18 RX ORDER — DIPHENHYDRAMINE HYDROCHLORIDE 50 MG/ML
50 INJECTION INTRAMUSCULAR; INTRAVENOUS AS NEEDED
OUTPATIENT
Start: 2025-02-25

## 2025-02-18 RX ORDER — HYDROCORTISONE SODIUM SUCCINATE 100 MG/2ML
100 INJECTION INTRAMUSCULAR; INTRAVENOUS AS NEEDED
OUTPATIENT
Start: 2025-02-25

## 2025-02-18 RX ORDER — SODIUM CHLORIDE 9 MG/ML
20 INJECTION, SOLUTION INTRAVENOUS ONCE
Status: DISCONTINUED | OUTPATIENT
Start: 2025-02-18 | End: 2025-02-18 | Stop reason: HOSPADM

## 2025-02-18 RX ORDER — FAMOTIDINE 10 MG/ML
20 INJECTION, SOLUTION INTRAVENOUS AS NEEDED
OUTPATIENT
Start: 2025-02-25

## 2025-02-18 RX ADMIN — LECANEMAB 950 MG: 100 INJECTION, SOLUTION INTRAVENOUS at 10:43

## 2025-02-19 ENCOUNTER — PATIENT MESSAGE (OUTPATIENT)
Dept: NEUROLOGY | Facility: CLINIC | Age: 65
End: 2025-02-19
Payer: COMMERCIAL

## 2025-02-19 NOTE — TELEPHONE ENCOUNTER
"Relay     \"We received a CPAP order but it should have gone to another provider. Who orders your CPAP machine and/or materials so that we can forward them this fax?\"    LVM and sent MyChart message            "

## 2025-02-20 ENCOUNTER — TELEPHONE (OUTPATIENT)
Dept: NEUROLOGY | Facility: CLINIC | Age: 65
End: 2025-02-20
Payer: COMMERCIAL

## 2025-02-20 NOTE — TELEPHONE ENCOUNTER
Faxtimothy liu cpap order to dr. Darren fitch office, confirmation that it went through was received

## 2025-02-20 NOTE — TELEPHONE ENCOUNTER
Provider: ERIKA MARCUS    Caller: Biju Verduzco    Relationship to Patient: Self    Phone Number: [phone]      Reason for Call: PT STATES HE IS RETURNING CALL TO [facility] TO PROVIDE INFO REQUESTED IN T.E. ON 02/19, CPAP SUPPLIES HAVE BEEN PRESCRIBED IN THE PAST FROM HIS PCP DR. MARIA ISABEL GRADY.    When was the patient last seen: 12/19/24      PLEASE REVIEW AND ADVISE

## 2025-03-04 ENCOUNTER — INFUSION (OUTPATIENT)
Dept: ONCOLOGY | Facility: HOSPITAL | Age: 65
End: 2025-03-04
Payer: COMMERCIAL

## 2025-03-04 VITALS
OXYGEN SATURATION: 97 % | HEART RATE: 69 BPM | RESPIRATION RATE: 15 BRPM | BODY MASS INDEX: 31.1 KG/M2 | DIASTOLIC BLOOD PRESSURE: 78 MMHG | HEIGHT: 69 IN | WEIGHT: 210 LBS | TEMPERATURE: 98.3 F | SYSTOLIC BLOOD PRESSURE: 142 MMHG

## 2025-03-04 DIAGNOSIS — G30.9 ALZHEIMER'S DISEASE: Primary | ICD-10-CM

## 2025-03-04 DIAGNOSIS — F02.80 ALZHEIMER'S DISEASE: Primary | ICD-10-CM

## 2025-03-04 PROCEDURE — 25810000003 SODIUM CHLORIDE 0.9 % SOLUTION 250 ML FLEX CONT

## 2025-03-04 PROCEDURE — 25010000002 LECANEMAB-IRMB 500 MG/5ML SOLUTION 5 ML VIAL

## 2025-03-04 PROCEDURE — 96365 THER/PROPH/DIAG IV INF INIT: CPT

## 2025-03-04 PROCEDURE — 96413 CHEMO IV INFUSION 1 HR: CPT

## 2025-03-04 RX ORDER — DIPHENHYDRAMINE HYDROCHLORIDE 50 MG/ML
50 INJECTION INTRAMUSCULAR; INTRAVENOUS AS NEEDED
OUTPATIENT
Start: 2025-03-18

## 2025-03-04 RX ORDER — FAMOTIDINE 10 MG/ML
20 INJECTION, SOLUTION INTRAVENOUS AS NEEDED
OUTPATIENT
Start: 2025-03-18

## 2025-03-04 RX ORDER — SODIUM CHLORIDE 9 MG/ML
20 INJECTION, SOLUTION INTRAVENOUS ONCE
OUTPATIENT
Start: 2025-03-18

## 2025-03-04 RX ORDER — HYDROCORTISONE SODIUM SUCCINATE 100 MG/2ML
100 INJECTION INTRAMUSCULAR; INTRAVENOUS AS NEEDED
OUTPATIENT
Start: 2025-03-18

## 2025-03-04 RX ADMIN — LECANEMAB 950 MG: 100 INJECTION, SOLUTION INTRAVENOUS at 10:36

## 2025-03-04 NOTE — NURSING NOTE
Pt arrived for Leqembi infusion and denies any s/s ARIA. Tolerated without incident, pt declined post infusion observation and discharged in stable condition.

## 2025-03-14 DIAGNOSIS — G30.9 ALZHEIMER'S DISEASE: Primary | ICD-10-CM

## 2025-03-14 DIAGNOSIS — Z51.81 THERAPEUTIC DRUG MONITORING: ICD-10-CM

## 2025-03-14 DIAGNOSIS — G31.84 AMNESTIC MCI (MILD COGNITIVE IMPAIRMENT WITH MEMORY LOSS): ICD-10-CM

## 2025-03-14 DIAGNOSIS — F02.80 ALZHEIMER'S DISEASE: Primary | ICD-10-CM

## 2025-03-18 ENCOUNTER — INFUSION (OUTPATIENT)
Dept: ONCOLOGY | Facility: HOSPITAL | Age: 65
End: 2025-03-18
Payer: COMMERCIAL

## 2025-03-18 VITALS
OXYGEN SATURATION: 98 % | RESPIRATION RATE: 15 BRPM | SYSTOLIC BLOOD PRESSURE: 169 MMHG | HEIGHT: 69 IN | WEIGHT: 209.8 LBS | DIASTOLIC BLOOD PRESSURE: 88 MMHG | TEMPERATURE: 97.2 F | BODY MASS INDEX: 31.07 KG/M2 | HEART RATE: 67 BPM

## 2025-03-18 DIAGNOSIS — G30.9 ALZHEIMER'S DISEASE: Primary | ICD-10-CM

## 2025-03-18 DIAGNOSIS — F02.80 ALZHEIMER'S DISEASE: Primary | ICD-10-CM

## 2025-03-18 PROCEDURE — 96365 THER/PROPH/DIAG IV INF INIT: CPT

## 2025-03-18 PROCEDURE — 25010000002 LECANEMAB-IRMB 500 MG/5ML SOLUTION 5 ML VIAL

## 2025-03-18 PROCEDURE — 96413 CHEMO IV INFUSION 1 HR: CPT

## 2025-03-18 PROCEDURE — 25810000003 SODIUM CHLORIDE 0.9 % SOLUTION 250 ML FLEX CONT

## 2025-03-18 RX ORDER — HYDROCORTISONE SODIUM SUCCINATE 100 MG/2ML
100 INJECTION INTRAMUSCULAR; INTRAVENOUS AS NEEDED
OUTPATIENT
Start: 2025-04-01

## 2025-03-18 RX ORDER — DIPHENHYDRAMINE HYDROCHLORIDE 50 MG/ML
50 INJECTION INTRAMUSCULAR; INTRAVENOUS AS NEEDED
OUTPATIENT
Start: 2025-04-01

## 2025-03-18 RX ORDER — SODIUM CHLORIDE 9 MG/ML
20 INJECTION, SOLUTION INTRAVENOUS ONCE
OUTPATIENT
Start: 2025-04-01

## 2025-03-18 RX ORDER — FAMOTIDINE 10 MG/ML
20 INJECTION, SOLUTION INTRAVENOUS AS NEEDED
OUTPATIENT
Start: 2025-04-01

## 2025-03-18 RX ADMIN — LECANEMAB 950 MG: 100 INJECTION, SOLUTION INTRAVENOUS at 14:12

## 2025-03-19 ENCOUNTER — TELEPHONE (OUTPATIENT)
Dept: NEUROLOGY | Facility: CLINIC | Age: 65
End: 2025-03-19
Payer: COMMERCIAL

## 2025-03-19 NOTE — TELEPHONE ENCOUNTER
Provider: TELMA LEIJA     Caller: KATALINA    Relationship to Patient: [facility]      Phone Number: [phone], [phone]    Reason for Call: KATALINA FROM [facility] IS CALLING TO SEEING IF THE PT IS STILL ON LEQEMBI AND IF A PA HAS BEEN SUBMITTED FOR THIS.      PLEASE REVIEW AND ADVISE     THANK YOU

## 2025-04-01 ENCOUNTER — INFUSION (OUTPATIENT)
Dept: ONCOLOGY | Facility: HOSPITAL | Age: 65
End: 2025-04-01
Payer: COMMERCIAL

## 2025-04-01 VITALS
RESPIRATION RATE: 15 BRPM | HEIGHT: 69 IN | BODY MASS INDEX: 30.51 KG/M2 | WEIGHT: 206 LBS | DIASTOLIC BLOOD PRESSURE: 84 MMHG | HEART RATE: 67 BPM | OXYGEN SATURATION: 96 % | TEMPERATURE: 98.1 F | SYSTOLIC BLOOD PRESSURE: 142 MMHG

## 2025-04-01 DIAGNOSIS — F02.80 ALZHEIMER'S DISEASE: Primary | ICD-10-CM

## 2025-04-01 DIAGNOSIS — G30.9 ALZHEIMER'S DISEASE: Primary | ICD-10-CM

## 2025-04-01 PROCEDURE — 96413 CHEMO IV INFUSION 1 HR: CPT

## 2025-04-01 PROCEDURE — 25810000003 SODIUM CHLORIDE 0.9 % SOLUTION 250 ML FLEX CONT

## 2025-04-01 PROCEDURE — 25010000002 LECANEMAB-IRMB 500 MG/5ML SOLUTION 5 ML VIAL

## 2025-04-01 PROCEDURE — 96365 THER/PROPH/DIAG IV INF INIT: CPT

## 2025-04-01 RX ORDER — SODIUM CHLORIDE 9 MG/ML
20 INJECTION, SOLUTION INTRAVENOUS ONCE
OUTPATIENT
Start: 2025-04-15

## 2025-04-01 RX ORDER — HYDROCORTISONE SODIUM SUCCINATE 100 MG/2ML
100 INJECTION INTRAMUSCULAR; INTRAVENOUS AS NEEDED
OUTPATIENT
Start: 2025-04-15

## 2025-04-01 RX ORDER — FAMOTIDINE 10 MG/ML
20 INJECTION, SOLUTION INTRAVENOUS AS NEEDED
OUTPATIENT
Start: 2025-04-15

## 2025-04-01 RX ORDER — DIPHENHYDRAMINE HYDROCHLORIDE 50 MG/ML
50 INJECTION, SOLUTION INTRAMUSCULAR; INTRAVENOUS AS NEEDED
OUTPATIENT
Start: 2025-04-15

## 2025-04-01 RX ADMIN — SODIUM CHLORIDE 930 MG: 9 INJECTION, SOLUTION INTRAVENOUS at 10:46

## 2025-04-09 ENCOUNTER — TELEPHONE (OUTPATIENT)
Dept: NEUROLOGY | Facility: CLINIC | Age: 65
End: 2025-04-09
Payer: COMMERCIAL

## 2025-04-09 NOTE — TELEPHONE ENCOUNTER
Stephanie Lennox a provider with [facility] is needing to speak with you regarding patient results. Please call Luci at [phone].

## 2025-04-11 ENCOUNTER — TELEPHONE (OUTPATIENT)
Dept: NEUROLOGY | Facility: CLINIC | Age: 65
End: 2025-04-11
Payer: COMMERCIAL

## 2025-04-11 ENCOUNTER — OFFICE VISIT (OUTPATIENT)
Dept: NEUROLOGY | Facility: CLINIC | Age: 65
End: 2025-04-11
Payer: COMMERCIAL

## 2025-04-11 DIAGNOSIS — Z82.0 FAMILY HISTORY OF ALZHEIMER'S DISEASE: ICD-10-CM

## 2025-04-11 DIAGNOSIS — Z51.81 THERAPEUTIC DRUG MONITORING: ICD-10-CM

## 2025-04-11 DIAGNOSIS — G30.9 ALZHEIMER'S DISEASE: Primary | ICD-10-CM

## 2025-04-11 DIAGNOSIS — F02.80 ALZHEIMER'S DISEASE: Primary | ICD-10-CM

## 2025-04-11 DIAGNOSIS — G31.84 AMNESTIC MCI (MILD COGNITIVE IMPAIRMENT WITH MEMORY LOSS): ICD-10-CM

## 2025-04-11 NOTE — PROGRESS NOTES
DOS: 2025  NAME: Biju Verduzco   : 1960  PCP: Darren Pérez MD    No chief complaint on file.     SUBJECTIVE  Neurological Problem:  64 y.o. right-handed male with a past medical history of hypercholesterolemia, prediabetes, Alzheimer's disease, mild cognitive impairment amnestic type, ARSH (compliant with CPAP). They are seen in follow up today for Alzheimer's disease, CDR screening. A summary of the history taken from the previous note with additions/modifications as indicated. He is accompanied by his spouse.    Interval History:   **For detailed interval history see progress note dated 24.     Mr. Verduzco has followed with our clinic since 2022 for mild cognitive impairment, amnestic type, previously seen by Dr. Leary and more recently myself in 2024.  He has been maximally medically managed on donepezil 10 mg daily and memantine 10 mg twice daily. He has a strong family history of Alzheimer's disease. Biomarker testing APOE4: positive for one copy of the APOE4 variant. Beta-amyloid 42/40 ratio 0.057, indicate higher likelihood of Alz disease.  We discussed treatment with lecanemab at length and he started monoclonal antibody therapy Oct 22, 2024.     He presents today, has received twelve infusions. MRI brain completed after sixth infusion, showed no signs of ARIA. He says he is feeling well with no worsening or significant change to his memory or cognitive abilities. He denies headache, dizziness, no visual or speech deficit, no face droop or trouble swallowing, no unilateral weakness or numbness to his extremities, no ataxia. Repeat clinical dementia rating scale today, score of 0.5. Repeat MMSE today: 29/30, 4/4 CLOCK, 5-min recall 2, 13 animals in 1 minute.       Review of Systems   Psychiatric/Behavioral:  Positive for confusion.         The following portions of the patient's history were reviewed and updated as appropriate: allergies, current medications, past family  history, past medical history, past social history, past surgical history and problem list.    Current Medications:   Current Outpatient Medications:     ASPIRIN 81 PO, Take 1 tablet by mouth Daily., Disp: , Rfl:     atorvastatin (LIPITOR) 10 MG tablet, TAKE 1 TABLET BY MOUTH ONCE DAILY FOR CHOLESTEROL AND HEART HEALTH, Disp: 90 tablet, Rfl: 0    Cholecalciferol (D3) 50 MCG (2000 UT) tablet, Take 1 tab by mouth daily for vitamin D deficiency, Disp: 90 each, Rfl: 3    Cholecalciferol (VITAMIN D PO), Take 1 tablet by mouth Daily., Disp: , Rfl:     clotrimazole (LOTRIMIN) 1 % cream, Apply 1 Application topically to the appropriate area as directed 2 (Two) Times a Day., Disp: 85 g, Rfl: 3    donepezil (ARICEPT) 10 MG tablet, Take 1 tablet by mouth Every Night., Disp: 90 tablet, Rfl: 3    meclizine (ANTIVERT) 25 MG tablet, Take 1 tablet by mouth 3 (Three) Times a Day As Needed for Dizziness., Disp: 30 tablet, Rfl: 2    memantine (NAMENDA) 10 MG tablet, Take 1 tablet by mouth Daily., Disp: 90 tablet, Rfl: 3    Multiple Vitamins-Minerals (MULTIVITAMIN ADULT PO), Take 1 tablet by mouth Daily., Disp: , Rfl:     Multiple Vitamins-Minerals (ZINC PO), Take 1 tablet by mouth Daily., Disp: , Rfl:     sildenafil (Viagra) 50 MG tablet, Take 1 tablet by mouth Daily As Needed for Erectile Dysfunction., Disp: 30 tablet, Rfl: 3    Thiamine HCl (VITAMIN B-1 PO), Take 1 tablet by mouth Daily., Disp: , Rfl:     venlafaxine XR (EFFEXOR-XR) 37.5 MG 24 hr capsule, TAKE 1 CAPSULE ONCE DAILY FOR MOOD AND CONCENTRATION, Disp: 90 capsule, Rfl: 3    vitamin C (ASCORBIC ACID) 250 MG tablet, Take 1 tablet by mouth Daily., Disp: , Rfl:     vitamin E 200 UNIT capsule, Take 1 capsule by mouth Daily., Disp: , Rfl:   **I did not stop or change the above medications.  Patient's medication list was updated to reflect medications they have reported as currently taking, including medication changes made by other providers.    Objective   Vital Signs:  There  were no vitals taken for this visit.  There is no height or weight on file to calculate BMI.    Physical Exam   Physical Exam:  GENERAL: NAD, alert  HEENT: Normocephalic, atraumatic   Resp: Even and unlabored    Neurological:   MS: AOx3, recent/remote memory intact, normal attention/concentration, language intact, no neglect. MMSE 29/30, 4/4 clock draw, 5-min word recall 2, 13 animals named in 1 minute.    CN: visual acuity grossly normal, PERRL, EOMI, no facial droop, no dysarthria  Motor: Moves all four extremities symmetrically and against gravity. Normal tone and bulk. No tremor or abnormal movements noted.   Sensory: Intact to crude touch in all four ext.  Gait and station: Normal gait and station    Result Review :  The following data was reviewed by: ERIKA Awad on 04/11/2025:  Laboratory Results:         Lab Results   Component Value Date    WBC 8.1 12/26/2024    HGB 13.3 12/26/2024    HCT 41.9 12/26/2024    MCV 89 12/26/2024     12/26/2024     Lab Results   Component Value Date    GLUCOSE 105 (H) 12/26/2024    BUN 13 12/26/2024    CREATININE 0.94 12/26/2024    EGFRIFNONA 95 10/05/2021    EGFRIFAFRI 110 10/05/2021    BCR 14 12/26/2024    K 4.4 12/26/2024    CO2 26 12/26/2024    CALCIUM 9.8 12/26/2024    ALBUMIN 4.5 12/26/2024    AST 26 12/26/2024    ALT 23 12/26/2024     Lab Results   Component Value Date    HGBA1C 5.9 (H) 12/26/2024     Lab Results   Component Value Date    CHOL 231 (H) 03/06/2019    CHOL 213 (H) 12/14/2016     Lab Results   Component Value Date    HDL 40 12/26/2024    HDL 42 12/12/2023    HDL 34 (L) 10/07/2022     Lab Results   Component Value Date    LDL 86 12/26/2024    LDL 98 12/12/2023     (H) 10/07/2022     Lab Results   Component Value Date    TRIG 277 (H) 12/26/2024    TRIG 199 (H) 12/12/2023    TRIG 324 (H) 10/07/2022     Lab Results   Component Value Date    RPR Non-Reactive 07/31/2024     Lab Results   Component Value Date    TSH 2.660 12/26/2024     Lab  Results   Component Value Date    [id number] 1,095 12/26/2024       Data reviewed : Radiologic studies             Assessment and Plan   Diagnoses and all orders for this visit:    1. Alzheimer's disease (Primary)    2. Amnestic MCI (mild cognitive impairment with memory loss)    3. Family history of Alzheimer's disease    4. Therapeutic drug monitoring      CDR box score: 0.5    Okay to continue monoclonal antibody therapy with Leqembi, received twelfth dose on April 1st.  Scheduled to receive thirteenth dose on April 15th then scheduled for a follow-up MRI to evaluate for ARIA on 4/22/25.  Discussed stroke-like symptoms/red flag symptoms and when to report to ED.     We will see Biju back in 3-6 months, sooner if symptoms warrant.     ERIKA Awad  [facility]   04/11/2025       I spent 60 minutes caring for Biju on this date of service. This time includes time spent by me in the following activities:preparing for the visit, reviewing tests, obtaining and/or reviewing a separately obtained history, performing a medically appropriate examination and/or evaluation , counseling and educating the patient/family/caregiver, ordering medications, tests, or procedures, referring and communicating with other health care professionals , documenting information in the medical record, independently interpreting results and communicating that information with the patient/family/caregiver, and care coordination  Follow Up   No follow-ups on file.  Patient was given instructions and counseling regarding his condition or for health maintenance advice. Please see specific information pulled into the AVS if appropriate.       Dictated using Dragon Dictation    As of April 2021, as required by the Federal 21st Century Cures Act, medical records (including provider notes and laboratory/imaging results) are to be made available to patient’s and/or their designees as soon as the documents are signed/resulted. While the  intention is to ensure transparency and to engage the patient in their healthcare, this immediate access may create unintended consequences as this document uses language intended for communication between medical experts and diagnostic results are interpreted with the entirety of the patient’s clinical picture in mind. It is recommended that patients and/or their designees review all available information with their primary or specialist providers for explanation and guidance to avoid misinterpretation based on layperson understanding, non-medical expert opinions, or Internet searches.

## 2025-04-15 ENCOUNTER — INFUSION (OUTPATIENT)
Dept: ONCOLOGY | Facility: HOSPITAL | Age: 65
End: 2025-04-15
Payer: COMMERCIAL

## 2025-04-15 VITALS
WEIGHT: 202.4 LBS | DIASTOLIC BLOOD PRESSURE: 75 MMHG | SYSTOLIC BLOOD PRESSURE: 137 MMHG | TEMPERATURE: 97 F | HEART RATE: 65 BPM | BODY MASS INDEX: 29.88 KG/M2 | OXYGEN SATURATION: 97 %

## 2025-04-15 DIAGNOSIS — G30.9 ALZHEIMER'S DISEASE: Primary | ICD-10-CM

## 2025-04-15 DIAGNOSIS — F02.80 ALZHEIMER'S DISEASE: Primary | ICD-10-CM

## 2025-04-15 PROCEDURE — 25010000002 LECANEMAB-IRMB 500 MG/5ML SOLUTION 5 ML VIAL

## 2025-04-15 PROCEDURE — 96365 THER/PROPH/DIAG IV INF INIT: CPT

## 2025-04-15 PROCEDURE — 25810000003 SODIUM CHLORIDE 0.9 % SOLUTION 250 ML FLEX CONT

## 2025-04-15 PROCEDURE — 96413 CHEMO IV INFUSION 1 HR: CPT

## 2025-04-15 RX ORDER — DIPHENHYDRAMINE HYDROCHLORIDE 50 MG/ML
50 INJECTION, SOLUTION INTRAMUSCULAR; INTRAVENOUS AS NEEDED
OUTPATIENT
Start: 2025-04-29

## 2025-04-15 RX ORDER — FAMOTIDINE 10 MG/ML
20 INJECTION, SOLUTION INTRAVENOUS AS NEEDED
OUTPATIENT
Start: 2025-04-29

## 2025-04-15 RX ORDER — SODIUM CHLORIDE 9 MG/ML
20 INJECTION, SOLUTION INTRAVENOUS ONCE
OUTPATIENT
Start: 2025-04-29

## 2025-04-15 RX ORDER — HYDROCORTISONE SODIUM SUCCINATE 100 MG/2ML
100 INJECTION INTRAMUSCULAR; INTRAVENOUS AS NEEDED
OUTPATIENT
Start: 2025-04-29

## 2025-04-15 RX ADMIN — SODIUM CHLORIDE 920 MG: 9 INJECTION, SOLUTION INTRAVENOUS at 12:04

## 2025-04-18 ENCOUNTER — TELEPHONE (OUTPATIENT)
Dept: NEUROLOGY | Facility: CLINIC | Age: 65
End: 2025-04-18

## 2025-04-18 NOTE — TELEPHONE ENCOUNTER
Provider: TELMA GAITAN    Caller: MAGDI WITH [employer]    Phone Number: [phone]    Reason for Call: STATES THE AUTH FOR LEQEMBI WAS APPROVED, DATES 4/11/25-4/11/26.

## 2025-04-22 ENCOUNTER — HOSPITAL ENCOUNTER (OUTPATIENT)
Dept: MRI IMAGING | Facility: HOSPITAL | Age: 65
Discharge: HOME | End: 2025-04-22
Payer: COMMERCIAL

## 2025-04-22 DIAGNOSIS — G30.9 ALZHEIMER'S DISEASE: ICD-10-CM

## 2025-04-22 DIAGNOSIS — G31.84 AMNESTIC MCI (MILD COGNITIVE IMPAIRMENT WITH MEMORY LOSS): ICD-10-CM

## 2025-04-22 DIAGNOSIS — F02.80 ALZHEIMER'S DISEASE: ICD-10-CM

## 2025-04-22 DIAGNOSIS — Z51.81 THERAPEUTIC DRUG MONITORING: ICD-10-CM

## 2025-04-22 PROCEDURE — 70553 MRI BRAIN STEM W/O & W/DYE: CPT

## 2025-04-22 PROCEDURE — 25510000002 GADOBENATE DIMEGLUMINE 529 MG/ML SOLUTION

## 2025-04-22 PROCEDURE — A9577 INJ MULTIHANCE: HCPCS

## 2025-04-22 RX ADMIN — GADOBENATE DIMEGLUMINE 15 ML: 529 INJECTION, SOLUTION INTRAVENOUS at 14:06

## 2025-06-25 ENCOUNTER — OFFICE VISIT (OUTPATIENT)
Dept: FAMILY MEDICINE CLINIC | Facility: CLINIC | Age: 65
End: 2025-06-25
Payer: MEDICARE

## 2025-06-25 ENCOUNTER — PATIENT ROUNDING (BHMG ONLY) (OUTPATIENT)
Dept: FAMILY MEDICINE CLINIC | Facility: CLINIC | Age: 65
End: 2025-06-25
Payer: MEDICARE

## 2025-06-25 VITALS
TEMPERATURE: 97.3 F | DIASTOLIC BLOOD PRESSURE: 76 MMHG | WEIGHT: 181 LBS | BODY MASS INDEX: 26.81 KG/M2 | HEIGHT: 69 IN | SYSTOLIC BLOOD PRESSURE: 124 MMHG | OXYGEN SATURATION: 96 % | HEART RATE: 57 BPM

## 2025-06-25 DIAGNOSIS — G30.9 ALZHEIMER'S DISEASE: ICD-10-CM

## 2025-06-25 DIAGNOSIS — E78.00 HYPERCHOLESTEREMIA: ICD-10-CM

## 2025-06-25 DIAGNOSIS — F02.80 ALZHEIMER'S DISEASE: ICD-10-CM

## 2025-06-25 DIAGNOSIS — R73.03 PREDIABETES: Primary | ICD-10-CM

## 2025-06-25 PROCEDURE — 1159F MED LIST DOCD IN RCRD: CPT | Performed by: FAMILY MEDICINE

## 2025-06-25 PROCEDURE — G2211 COMPLEX E/M VISIT ADD ON: HCPCS | Performed by: FAMILY MEDICINE

## 2025-06-25 PROCEDURE — 99214 OFFICE O/P EST MOD 30 MIN: CPT | Performed by: FAMILY MEDICINE

## 2025-06-25 PROCEDURE — 1160F RVW MEDS BY RX/DR IN RCRD: CPT | Performed by: FAMILY MEDICINE

## 2025-06-25 RX ORDER — DONEPEZIL HYDROCHLORIDE 10 MG/1
10 TABLET, FILM COATED ORAL NIGHTLY
Qty: 90 TABLET | Refills: 3 | Status: SHIPPED | OUTPATIENT
Start: 2025-06-25

## 2025-06-25 RX ORDER — MEMANTINE HYDROCHLORIDE 10 MG/1
10 TABLET ORAL DAILY
Qty: 90 TABLET | Refills: 3 | Status: SHIPPED | OUTPATIENT
Start: 2025-06-25

## 2025-06-25 RX ORDER — ATORVASTATIN CALCIUM 10 MG/1
TABLET, FILM COATED ORAL
Qty: 90 TABLET | Refills: 3 | Status: SHIPPED | OUTPATIENT
Start: 2025-06-25

## 2025-06-25 NOTE — PROGRESS NOTES
"  Subjective   Biju Verduzco is a 65 y.o. male who is here for   Chief Complaint   Patient presents with    Establish Care   .     History of Present Illness   History of Present Illness  Biju Verduzco presents to the office to establish care.  Patient is a previous patient of Dr. Pérez.  He has diagnosis of Alzheimer's dementia.  He has been previously receiving Leqembi injections however his insurance stopped covering procedure.  Patient states he felt like this was helping.  He is also taking Aricept and Namenda.  He states he recently cut out all sugar.  Patient states he feels like this may be helping his memory.  He has lost around 30 pounds since stopping sugary foods and drinks.    Review of Systems   Constitutional:  Negative for activity change and appetite change.   Respiratory:  Negative for cough and shortness of breath.    Cardiovascular:  Negative for chest pain and leg swelling.   Skin:  Negative for color change and rash.   Psychiatric/Behavioral:  Positive for decreased concentration (Memory problem).        Objective   Vitals:    06/25/25 0910   BP: 124/76   BP Location: Left arm   Patient Position: Sitting   Cuff Size: Adult   Pulse: 57   Temp: 97.3 °F (36.3 °C)   SpO2: 96%   Weight: 82.1 kg (181 lb)   Height: 175.3 cm (69.02\")      Physical Exam  Vitals and nursing note reviewed.   Constitutional:       Appearance: Normal appearance. He is normal weight.   HENT:      Head: Normocephalic and atraumatic.   Cardiovascular:      Rate and Rhythm: Normal rate and regular rhythm.      Pulses: Normal pulses.      Heart sounds: No murmur heard.  Pulmonary:      Effort: Pulmonary effort is normal. No respiratory distress.      Breath sounds: Normal breath sounds. No wheezing.   Skin:     General: Skin is warm and dry.   Neurological:      General: No focal deficit present.      Mental Status: He is alert.   Psychiatric:         Mood and Affect: Mood normal.         Thought Content: Thought content " normal.       Physical Exam        Assessment & Plan   Assessment & Plan    Diagnoses and all orders for this visit:    1. Prediabetes (Primary)  We will check baseline labs.  -     Hemoglobin A1c  -     TSH Rfx On Abnormal To Free T4  -     Lipid Panel  -     CBC & Differential    2. Hypercholesteremia  -     atorvastatin (LIPITOR) 10 MG tablet; TAKE 1 TABLET BY MOUTH ONCE DAILY FOR CHOLESTEROL AND HEART HEALTH  Dispense: 90 tablet; Refill: 3  -     Comprehensive Metabolic Panel  -     TSH Rfx On Abnormal To Free T4  -     Lipid Panel  -     CBC & Differential    3. Alzheimer's disease  Currently on Namenda and donepezil.  Patient has previously had leqembi injections.  Patient states this was discontinued secondary to insurance issues.  He is seeing neurology.  -     donepezil (ARICEPT) 10 MG tablet; Take 1 tablet by mouth Every Night.  Dispense: 90 tablet; Refill: 3  -     memantine (NAMENDA) 10 MG tablet; Take 1 tablet by mouth Daily.  Dispense: 90 tablet; Refill: 3  -     TSH Rfx On Abnormal To Free T4  -     CBC & Differential      Results      There are no Patient Instructions on file for this visit.    Medications Discontinued During This Encounter   Medication Reason    clotrimazole (LOTRIMIN) 1 % cream *Therapy completed    Multiple Vitamins-Minerals (ZINC PO) Duplicate order    meclizine (ANTIVERT) 25 MG tablet *Therapy completed    sildenafil (Viagra) 50 MG tablet *Therapy completed    donepezil (ARICEPT) 10 MG tablet Reorder    memantine (NAMENDA) 10 MG tablet Reorder    atorvastatin (LIPITOR) 10 MG tablet Reorder        Return in about 3 months (around 9/25/2025), or if symptoms worsen or fail to improve, for Medicare Wellness.       Hermelindo Mason MD  [facility]  [place], [place]

## 2025-06-26 LAB
ALBUMIN SERPL-MCNC: 4.5 G/DL (ref 3.5–5.2)
ALBUMIN/GLOB SERPL: 1.6 G/DL
ALP SERPL-CCNC: 82 U/L (ref 39–117)
ALT SERPL-CCNC: 35 U/L (ref 1–41)
AST SERPL-CCNC: 36 U/L (ref 1–40)
BASOPHILS # BLD AUTO: 0.06 10*3/MM3 (ref 0–0.2)
BASOPHILS NFR BLD AUTO: 0.9 % (ref 0–1.5)
BILIRUB SERPL-MCNC: 0.3 MG/DL (ref 0–1.2)
BUN SERPL-MCNC: 18 MG/DL (ref 8–23)
BUN/CREAT SERPL: 20.7 (ref 7–25)
CALCIUM SERPL-MCNC: 9.9 MG/DL (ref 8.6–10.5)
CHLORIDE SERPL-SCNC: 103 MMOL/L (ref 98–107)
CHOLEST SERPL-MCNC: 121 MG/DL (ref 0–200)
CO2 SERPL-SCNC: 27.3 MMOL/L (ref 22–29)
CREAT SERPL-MCNC: 0.87 MG/DL (ref 0.76–1.27)
EGFRCR SERPLBLD CKD-EPI 2021: 95.8 ML/MIN/1.73
EOSINOPHIL # BLD AUTO: 0.21 10*3/MM3 (ref 0–0.4)
EOSINOPHIL NFR BLD AUTO: 3.3 % (ref 0.3–6.2)
ERYTHROCYTE [DISTWIDTH] IN BLOOD BY AUTOMATED COUNT: 14.1 % (ref 12.3–15.4)
GLOBULIN SER CALC-MCNC: 2.9 GM/DL
GLUCOSE SERPL-MCNC: 102 MG/DL (ref 65–99)
HBA1C MFR BLD: 5.8 % (ref 4.8–5.6)
HCT VFR BLD AUTO: 43.5 % (ref 37.5–51)
HDLC SERPL-MCNC: 39 MG/DL (ref 40–60)
HGB BLD-MCNC: 13.9 G/DL (ref 13–17.7)
IMM GRANULOCYTES # BLD AUTO: 0.01 10*3/MM3 (ref 0–0.05)
IMM GRANULOCYTES NFR BLD AUTO: 0.2 % (ref 0–0.5)
LDLC SERPL CALC-MCNC: 59 MG/DL (ref 0–100)
LYMPHOCYTES # BLD AUTO: 1.99 10*3/MM3 (ref 0.7–3.1)
LYMPHOCYTES NFR BLD AUTO: 30.9 % (ref 19.6–45.3)
MCH RBC QN AUTO: 28.7 PG (ref 26.6–33)
MCHC RBC AUTO-ENTMCNC: 32 G/DL (ref 31.5–35.7)
MCV RBC AUTO: 89.9 FL (ref 79–97)
MONOCYTES # BLD AUTO: 0.7 10*3/MM3 (ref 0.1–0.9)
MONOCYTES NFR BLD AUTO: 10.9 % (ref 5–12)
NEUTROPHILS # BLD AUTO: 3.47 10*3/MM3 (ref 1.7–7)
NEUTROPHILS NFR BLD AUTO: 53.8 % (ref 42.7–76)
NRBC BLD AUTO-RTO: 0 /100 WBC (ref 0–0.2)
PLATELET # BLD AUTO: 337 10*3/MM3 (ref 140–450)
POTASSIUM SERPL-SCNC: 4.9 MMOL/L (ref 3.5–5.2)
PROT SERPL-MCNC: 7.4 G/DL (ref 6–8.5)
RBC # BLD AUTO: 4.84 10*6/MM3 (ref 4.14–5.8)
SODIUM SERPL-SCNC: 141 MMOL/L (ref 136–145)
TRIGL SERPL-MCNC: 129 MG/DL (ref 0–150)
TSH SERPL DL<=0.005 MIU/L-ACNC: 2.65 UIU/ML (ref 0.27–4.2)
VLDLC SERPL CALC-MCNC: 23 MG/DL (ref 5–40)
WBC # BLD AUTO: 6.44 10*3/MM3 (ref 3.4–10.8)

## 2025-08-15 ENCOUNTER — PATIENT MESSAGE (OUTPATIENT)
Dept: NEUROLOGY | Facility: CLINIC | Age: 65
End: 2025-08-15
Payer: MEDICARE

## 2025-08-28 ENCOUNTER — TELEPHONE (OUTPATIENT)
Dept: FAMILY MEDICINE CLINIC | Facility: CLINIC | Age: 65
End: 2025-08-28
Payer: MEDICARE